# Patient Record
Sex: FEMALE | Race: WHITE | Employment: OTHER | URBAN - METROPOLITAN AREA
[De-identification: names, ages, dates, MRNs, and addresses within clinical notes are randomized per-mention and may not be internally consistent; named-entity substitution may affect disease eponyms.]

---

## 2022-01-14 ENCOUNTER — OFFICE VISIT (OUTPATIENT)
Dept: ORTHOPEDIC SURGERY | Age: 87
End: 2022-01-14
Payer: MEDICARE

## 2022-01-14 VITALS — HEIGHT: 64 IN | BODY MASS INDEX: 23.9 KG/M2 | WEIGHT: 140 LBS

## 2022-01-14 DIAGNOSIS — S93.602A FOOT SPRAIN, LEFT, INITIAL ENCOUNTER: ICD-10-CM

## 2022-01-14 DIAGNOSIS — M79.672 LEFT FOOT PAIN: Primary | ICD-10-CM

## 2022-01-14 PROCEDURE — G8420 CALC BMI NORM PARAMETERS: HCPCS | Performed by: ORTHOPAEDIC SURGERY

## 2022-01-14 PROCEDURE — 1101F PT FALLS ASSESS-DOCD LE1/YR: CPT | Performed by: ORTHOPAEDIC SURGERY

## 2022-01-14 PROCEDURE — G8536 NO DOC ELDER MAL SCRN: HCPCS | Performed by: ORTHOPAEDIC SURGERY

## 2022-01-14 PROCEDURE — 1090F PRES/ABSN URINE INCON ASSESS: CPT | Performed by: ORTHOPAEDIC SURGERY

## 2022-01-14 PROCEDURE — 99214 OFFICE O/P EST MOD 30 MIN: CPT | Performed by: ORTHOPAEDIC SURGERY

## 2022-01-14 PROCEDURE — G8432 DEP SCR NOT DOC, RNG: HCPCS | Performed by: ORTHOPAEDIC SURGERY

## 2022-01-14 PROCEDURE — G8400 PT W/DXA NO RESULTS DOC: HCPCS | Performed by: ORTHOPAEDIC SURGERY

## 2022-01-14 PROCEDURE — G8427 DOCREV CUR MEDS BY ELIG CLIN: HCPCS | Performed by: ORTHOPAEDIC SURGERY

## 2022-01-14 NOTE — PROGRESS NOTES
Jo Burch (: 1935) is a 81 y.o. female, established patient, here for evaluation of the following chief complaint(s): Foot Pain (left )       ASSESSMENT/PLAN:  Below is the assessment and plan developed based on review of pertinent history, physical exam, labs, studies, and medications. Findings were discussed with the patient today. We discussed regimen of ice, anti-inflammatories, physical therapy, home exercise program, and activity modifications. If there is continued pain and symptoms then we will plan for follow-up in the next 4-6 weeks for further evaluation and treatment planning. We will try a hard soled shoe versus short boot to see if this will help make her symptoms more tolerable. 1. Left foot pain  -     XR FOOT LT MIN 3 V; Future  2. Foot sprain, left, initial encounter  -     REFERRAL TO DME      Return in about 4 weeks (around 2022), or if symptoms worsen or fail to improve. SUBJECTIVE/OBJECTIVE:  Jo Burch (: 1935) is a 81 y.o. female. She notes sharp onset of pain at the distal portion of the foot between first and second toes that occurred 1 week ago when standing. She has had difficulty walking because of this pain. She has noted some swelling as well. Allergies   Allergen Reactions    Biaxin [Clarithromycin] Other (comments)     PSYCHOTIC AFFECT    Shellfish Derived Nausea and Vomiting and Swelling     Swelling of tounge       Current Outpatient Medications   Medication Sig    glipiZIDE (GLUCOTROL) 5 mg tablet Take 2.5 mg by mouth two (2) times a day. Indications: TYPE 2 DIABETES MELLITUS    metFORMIN (GLUCOPHAGE) 500 mg tablet Take 1,000 mg by mouth two (2) times daily (with meals). BREAKFAST AND DINNER     digoxin (LANOXIN) 0.25 mg tablet Take 0.125 mg by mouth daily. No current facility-administered medications for this visit.        Social History     Socioeconomic History    Marital status:      Spouse name: Not on file    Number of children: Not on file    Years of education: Not on file    Highest education level: Not on file   Occupational History    Not on file   Tobacco Use    Smoking status: Never Smoker    Smokeless tobacco: Never Used   Substance and Sexual Activity    Alcohol use: No    Drug use: No    Sexual activity: Not on file   Other Topics Concern    Not on file   Social History Narrative    Not on file     Social Determinants of Health     Financial Resource Strain:     Difficulty of Paying Living Expenses: Not on file   Food Insecurity:     Worried About Running Out of Food in the Last Year: Not on file    Daya of Food in the Last Year: Not on file   Transportation Needs:     Lack of Transportation (Medical): Not on file    Lack of Transportation (Non-Medical):  Not on file   Physical Activity:     Days of Exercise per Week: Not on file    Minutes of Exercise per Session: Not on file   Stress:     Feeling of Stress : Not on file   Social Connections:     Frequency of Communication with Friends and Family: Not on file    Frequency of Social Gatherings with Friends and Family: Not on file    Attends Nondenominational Services: Not on file    Active Member of [address] [place] [address] or Organizations: Not on file    Attends Club or Organization Meetings: Not on file    Marital Status: Not on file   Intimate Partner Violence:     Fear of Current or Ex-Partner: Not on file    Emotionally Abused: Not on file    Physically Abused: Not on file    Sexually Abused: Not on file   Housing Stability:     Unable to Pay for Housing in the Last Year: Not on file    Number of [place] in the Last Year: Not on file    Unstable Housing in the Last Year: Not on file       Past Surgical History:   Procedure Laterality Date    HX DILATION AND CURETTAGE  1960'S    HX HYSTERECTOMY  1984    HX TONSILLECTOMY  1956    HX UROLOGICAL  2009    BLADDER TUCK       Family History   Problem Relation Age of Onset    Cancer Mother         UTERINE    Heart Disease Father         OB History    No obstetric history on file. REVIEW OF SYSTEMS:  ROS     Positive for: Musculoskeletal    Last edited by Dre Rosa on 1/14/2022  2:28 PM. (History)        Patient denies any recent fever, chills, nausea, vomiting, chest pain, or shortness of breath. Vitals:  Ht 5' 4\" (1.626 m)   Wt 140 lb (63.5 kg)   BMI 24.03 kg/m²    Body mass index is 24.03 kg/m². PHYSICAL EXAM:  General exam: Patient is awake, alert, and oriented x3. Well-appearing. No acute distress. Ambulates with an antalgic gait    Left foot: There is obvious swelling and mild ecchymosis in the foot. Compartments are soft and compressible. She has tenderness palpation overlying the second metatarsal.  Adequate range of motion of the toes is noted. Normal stability and motion of the ankle. IMAGING:    XR Results (most recent):  Results from Appointment encounter on 01/14/22    XR FOOT LT MIN 3 V    Narrative  X-rays of the left foot 3 views done today show evidence of osteopenia diffuse. There is evidence of degenerative changes throughout the midfoot and forefoot. No obvious acute displaced fracture. There is evidence of a thickened cortical line along the lateral border of the fourth metatarsal         Orders Placed This Encounter    XR FOOT LT MIN 3 V     3     Standing Status:   Future     Number of Occurrences:   1     Standing Expiration Date:   7/14/2022   Blanca Meredith [facility] - REFERRAL TO DME [[id number]]     Referral Priority:   Routine     Referral Type:   Consultation     Referral Reason:   Specialty Services Required     Number of Visits Requested:   1              An electronic signature was used to authenticate this note.   -- Marcia Parker DO

## 2022-01-14 NOTE — LETTER
1/14/2022    Patient: Yen Celis   YOB: 1935   Date of Visit: 1/14/2022     Yun Nuñez MD  [place] [phone]  Via Fax: [phone]    Dear Yun Nuñez MD,      Thank you for referring Ms. Noni Miller to [facility] for evaluation. My notes for this consultation are attached. If you have questions, please do not hesitate to call me. I look forward to following your patient along with you.       Sincerely,    Susan Weinstein, DO

## 2023-07-13 ENCOUNTER — APPOINTMENT (OUTPATIENT)
Facility: HOSPITAL | Age: 88
End: 2023-07-13
Payer: MEDICARE

## 2023-07-13 ENCOUNTER — HOSPITAL ENCOUNTER (INPATIENT)
Facility: HOSPITAL | Age: 88
LOS: 1 days | Discharge: HOME | End: 2023-07-14
Attending: EMERGENCY MEDICINE | Admitting: INTERNAL MEDICINE
Payer: MEDICARE

## 2023-07-13 DIAGNOSIS — J96.01 ACUTE RESPIRATORY FAILURE WITH HYPOXIA (HCC): Primary | ICD-10-CM

## 2023-07-13 DIAGNOSIS — J11.1 INFLUENZA: ICD-10-CM

## 2023-07-13 PROBLEM — R09.02 HYPOXIA: Status: ACTIVE | Noted: 2023-07-13

## 2023-07-13 LAB
ANION GAP SERPL CALC-SCNC: 4 MMOL/L (ref 5–15)
BASOPHILS # BLD: 0.1 K/UL (ref 0–0.1)
BASOPHILS NFR BLD: 1 % (ref 0–1)
BUN SERPL-MCNC: 11 MG/DL (ref 6–20)
BUN/CREAT SERPL: 15 (ref 12–20)
CALCIUM SERPL-MCNC: 9.2 MG/DL (ref 8.5–10.1)
CHLORIDE SERPL-SCNC: 96 MMOL/L (ref 97–108)
CO2 SERPL-SCNC: 34 MMOL/L (ref 21–32)
CREAT SERPL-MCNC: 0.74 MG/DL (ref 0.55–1.02)
DIFFERENTIAL METHOD BLD: ABNORMAL
EOSINOPHIL # BLD: 0.1 K/UL (ref 0–0.4)
EOSINOPHIL NFR BLD: 1 % (ref 0–7)
ERYTHROCYTE [DISTWIDTH] IN BLOOD BY AUTOMATED COUNT: 13.8 % (ref 11.5–14.5)
FLUAV AG NPH QL IA: NEGATIVE
FLUBV AG NOSE QL IA: NEGATIVE
GLUCOSE BLD STRIP.AUTO-MCNC: 341 MG/DL (ref 65–117)
GLUCOSE BLD STRIP.AUTO-MCNC: 388 MG/DL (ref 65–117)
GLUCOSE SERPL-MCNC: 272 MG/DL (ref 65–100)
HCT VFR BLD AUTO: 40.1 % (ref 35–47)
HGB BLD-MCNC: 12.4 G/DL (ref 11.5–16)
IMM GRANULOCYTES # BLD AUTO: 0 K/UL (ref 0–0.04)
IMM GRANULOCYTES NFR BLD AUTO: 0 % (ref 0–0.5)
LYMPHOCYTES # BLD: 1.2 K/UL (ref 0.8–3.5)
LYMPHOCYTES NFR BLD: 17 % (ref 12–49)
MAGNESIUM SERPL-MCNC: 1.7 MG/DL (ref 1.6–2.4)
MCH RBC QN AUTO: 28.2 PG (ref 26–34)
MCHC RBC AUTO-ENTMCNC: 30.9 G/DL (ref 30–36.5)
MCV RBC AUTO: 91.3 FL (ref 80–99)
MONOCYTES # BLD: 0.6 K/UL (ref 0–1)
MONOCYTES NFR BLD: 9 % (ref 5–13)
NEUTS SEG # BLD: 5.3 K/UL (ref 1.8–8)
NEUTS SEG NFR BLD: 72 % (ref 32–75)
NRBC # BLD: 0 K/UL (ref 0–0.01)
NRBC BLD-RTO: 0 PER 100 WBC
NT PRO BNP: 277 PG/ML
PLATELET # BLD AUTO: 242 K/UL (ref 150–400)
PMV BLD AUTO: 8.6 FL (ref 8.9–12.9)
POTASSIUM SERPL-SCNC: 4.2 MMOL/L (ref 3.5–5.1)
RBC # BLD AUTO: 4.39 M/UL (ref 3.8–5.2)
SARS-COV-2 RDRP RESP QL NAA+PROBE: NOT DETECTED
SERVICE CMNT-IMP: ABNORMAL
SERVICE CMNT-IMP: ABNORMAL
SODIUM SERPL-SCNC: 134 MMOL/L (ref 136–145)
SOURCE: NORMAL
TROPONIN I SERPL HS-MCNC: 11 NG/L (ref 0–51)
WBC # BLD AUTO: 7.4 K/UL (ref 3.6–11)

## 2023-07-13 PROCEDURE — 96374 THER/PROPH/DIAG INJ IV PUSH: CPT

## 2023-07-13 PROCEDURE — 6360000002 HC RX W HCPCS

## 2023-07-13 PROCEDURE — 94761 N-INVAS EAR/PLS OXIMETRY MLT: CPT

## 2023-07-13 PROCEDURE — 83735 ASSAY OF MAGNESIUM: CPT

## 2023-07-13 PROCEDURE — 6370000000 HC RX 637 (ALT 250 FOR IP): Performed by: STUDENT IN AN ORGANIZED HEALTH CARE EDUCATION/TRAINING PROGRAM

## 2023-07-13 PROCEDURE — 2580000003 HC RX 258: Performed by: INTERNAL MEDICINE

## 2023-07-13 PROCEDURE — 99285 EMERGENCY DEPT VISIT HI MDM: CPT

## 2023-07-13 PROCEDURE — 94640 AIRWAY INHALATION TREATMENT: CPT

## 2023-07-13 PROCEDURE — 36415 COLL VENOUS BLD VENIPUNCTURE: CPT

## 2023-07-13 PROCEDURE — 82962 GLUCOSE BLOOD TEST: CPT

## 2023-07-13 PROCEDURE — 84484 ASSAY OF TROPONIN QUANT: CPT

## 2023-07-13 PROCEDURE — 83880 ASSAY OF NATRIURETIC PEPTIDE: CPT

## 2023-07-13 PROCEDURE — 71045 X-RAY EXAM CHEST 1 VIEW: CPT

## 2023-07-13 PROCEDURE — 94664 DEMO&/EVAL PT USE INHALER: CPT

## 2023-07-13 PROCEDURE — 83036 HEMOGLOBIN GLYCOSYLATED A1C: CPT

## 2023-07-13 PROCEDURE — 6370000000 HC RX 637 (ALT 250 FOR IP): Performed by: INTERNAL MEDICINE

## 2023-07-13 PROCEDURE — 87635 SARS-COV-2 COVID-19 AMP PRB: CPT

## 2023-07-13 PROCEDURE — 80048 BASIC METABOLIC PNL TOTAL CA: CPT

## 2023-07-13 PROCEDURE — 87804 INFLUENZA ASSAY W/OPTIC: CPT

## 2023-07-13 PROCEDURE — 2700000000 HC OXYGEN THERAPY PER DAY

## 2023-07-13 PROCEDURE — 93005 ELECTROCARDIOGRAM TRACING: CPT | Performed by: EMERGENCY MEDICINE

## 2023-07-13 PROCEDURE — 6370000000 HC RX 637 (ALT 250 FOR IP): Performed by: EMERGENCY MEDICINE

## 2023-07-13 PROCEDURE — 6360000002 HC RX W HCPCS: Performed by: INTERNAL MEDICINE

## 2023-07-13 PROCEDURE — 85025 COMPLETE CBC W/AUTO DIFF WBC: CPT

## 2023-07-13 PROCEDURE — 1100000003 HC PRIVATE W/ TELEMETRY

## 2023-07-13 RX ORDER — CASTOR OIL AND BALSAM, PERU 788; 87 MG/G; MG/G
OINTMENT TOPICAL 2 TIMES DAILY
Status: DISCONTINUED | OUTPATIENT
Start: 2023-07-13 | End: 2023-07-14 | Stop reason: HOSPADM

## 2023-07-13 RX ORDER — IPRATROPIUM BROMIDE AND ALBUTEROL SULFATE 2.5; .5 MG/3ML; MG/3ML
1 SOLUTION RESPIRATORY (INHALATION) EVERY 4 HOURS PRN
Status: DISCONTINUED | OUTPATIENT
Start: 2023-07-13 | End: 2023-07-14 | Stop reason: HOSPADM

## 2023-07-13 RX ORDER — BENZONATATE 100 MG/1
100 CAPSULE ORAL 3 TIMES DAILY PRN
Status: DISCONTINUED | OUTPATIENT
Start: 2023-07-13 | End: 2023-07-14 | Stop reason: HOSPADM

## 2023-07-13 RX ORDER — SODIUM CHLORIDE 9 MG/ML
INJECTION, SOLUTION INTRAVENOUS CONTINUOUS
Status: DISCONTINUED | OUTPATIENT
Start: 2023-07-13 | End: 2023-07-14

## 2023-07-13 RX ORDER — ACETAMINOPHEN 650 MG/1
650 SUPPOSITORY RECTAL EVERY 6 HOURS PRN
Status: DISCONTINUED | OUTPATIENT
Start: 2023-07-13 | End: 2023-07-14 | Stop reason: HOSPADM

## 2023-07-13 RX ORDER — SODIUM CHLORIDE 0.9 % (FLUSH) 0.9 %
5-40 SYRINGE (ML) INJECTION EVERY 12 HOURS SCHEDULED
Status: DISCONTINUED | OUTPATIENT
Start: 2023-07-13 | End: 2023-07-14 | Stop reason: HOSPADM

## 2023-07-13 RX ORDER — INSULIN LISPRO 100 [IU]/ML
0-4 INJECTION, SOLUTION INTRAVENOUS; SUBCUTANEOUS
Status: DISCONTINUED | OUTPATIENT
Start: 2023-07-13 | End: 2023-07-13

## 2023-07-13 RX ORDER — ONDANSETRON 2 MG/ML
4 INJECTION INTRAMUSCULAR; INTRAVENOUS EVERY 6 HOURS PRN
Status: DISCONTINUED | OUTPATIENT
Start: 2023-07-13 | End: 2023-07-14 | Stop reason: HOSPADM

## 2023-07-13 RX ORDER — DIGOXIN 125 MCG
0.12 TABLET ORAL DAILY
Status: DISCONTINUED | OUTPATIENT
Start: 2023-07-14 | End: 2023-07-13

## 2023-07-13 RX ORDER — ENOXAPARIN SODIUM 100 MG/ML
40 INJECTION SUBCUTANEOUS EVERY 24 HOURS
Status: DISCONTINUED | OUTPATIENT
Start: 2023-07-13 | End: 2023-07-14 | Stop reason: HOSPADM

## 2023-07-13 RX ORDER — ONDANSETRON 2 MG/ML
4 INJECTION INTRAMUSCULAR; INTRAVENOUS EVERY 4 HOURS PRN
Status: DISCONTINUED | OUTPATIENT
Start: 2023-07-13 | End: 2023-07-13

## 2023-07-13 RX ORDER — ONDANSETRON 4 MG/1
4 TABLET, ORALLY DISINTEGRATING ORAL EVERY 8 HOURS PRN
Status: DISCONTINUED | OUTPATIENT
Start: 2023-07-13 | End: 2023-07-14 | Stop reason: HOSPADM

## 2023-07-13 RX ORDER — DEXTROSE MONOHYDRATE 100 MG/ML
INJECTION, SOLUTION INTRAVENOUS CONTINUOUS PRN
Status: DISCONTINUED | OUTPATIENT
Start: 2023-07-13 | End: 2023-07-14 | Stop reason: HOSPADM

## 2023-07-13 RX ORDER — OSELTAMIVIR PHOSPHATE 75 MG/1
75 CAPSULE ORAL ONCE
Status: COMPLETED | OUTPATIENT
Start: 2023-07-13 | End: 2023-07-13

## 2023-07-13 RX ORDER — SODIUM CHLORIDE 0.9 % (FLUSH) 0.9 %
5-40 SYRINGE (ML) INJECTION PRN
Status: DISCONTINUED | OUTPATIENT
Start: 2023-07-13 | End: 2023-07-14 | Stop reason: HOSPADM

## 2023-07-13 RX ORDER — ALBUTEROL SULFATE 2.5 MG/3ML
2.5 SOLUTION RESPIRATORY (INHALATION) EVERY 6 HOURS PRN
Status: DISCONTINUED | OUTPATIENT
Start: 2023-07-13 | End: 2023-07-14 | Stop reason: HOSPADM

## 2023-07-13 RX ORDER — GUAIFENESIN 600 MG/1
600 TABLET, EXTENDED RELEASE ORAL 2 TIMES DAILY
Status: DISCONTINUED | OUTPATIENT
Start: 2023-07-13 | End: 2023-07-14 | Stop reason: HOSPADM

## 2023-07-13 RX ORDER — OSELTAMIVIR PHOSPHATE 30 MG/1
30 CAPSULE ORAL 2 TIMES DAILY
Status: DISCONTINUED | OUTPATIENT
Start: 2023-07-13 | End: 2023-07-14 | Stop reason: HOSPADM

## 2023-07-13 RX ORDER — GLIPIZIDE 5 MG/1
2.5 TABLET ORAL 2 TIMES DAILY
Status: DISCONTINUED | OUTPATIENT
Start: 2023-07-13 | End: 2023-07-14 | Stop reason: HOSPADM

## 2023-07-13 RX ORDER — MAGNESIUM HYDROXIDE/ALUMINUM HYDROXICE/SIMETHICONE 120; 1200; 1200 MG/30ML; MG/30ML; MG/30ML
30 SUSPENSION ORAL EVERY 6 HOURS PRN
Status: DISCONTINUED | OUTPATIENT
Start: 2023-07-13 | End: 2023-07-14 | Stop reason: HOSPADM

## 2023-07-13 RX ORDER — INSULIN LISPRO 100 [IU]/ML
0-16 INJECTION, SOLUTION INTRAVENOUS; SUBCUTANEOUS
Status: DISCONTINUED | OUTPATIENT
Start: 2023-07-14 | End: 2023-07-14 | Stop reason: HOSPADM

## 2023-07-13 RX ORDER — INSULIN LISPRO 100 [IU]/ML
0-4 INJECTION, SOLUTION INTRAVENOUS; SUBCUTANEOUS NIGHTLY
Status: DISCONTINUED | OUTPATIENT
Start: 2023-07-13 | End: 2023-07-13

## 2023-07-13 RX ORDER — SODIUM CHLORIDE 9 MG/ML
INJECTION, SOLUTION INTRAVENOUS PRN
Status: DISCONTINUED | OUTPATIENT
Start: 2023-07-13 | End: 2023-07-14 | Stop reason: HOSPADM

## 2023-07-13 RX ORDER — INSULIN LISPRO 100 [IU]/ML
0-4 INJECTION, SOLUTION INTRAVENOUS; SUBCUTANEOUS NIGHTLY
Status: DISCONTINUED | OUTPATIENT
Start: 2023-07-13 | End: 2023-07-14 | Stop reason: HOSPADM

## 2023-07-13 RX ORDER — POLYETHYLENE GLYCOL 3350 17 G/17G
17 POWDER, FOR SOLUTION ORAL DAILY PRN
Status: DISCONTINUED | OUTPATIENT
Start: 2023-07-13 | End: 2023-07-14 | Stop reason: HOSPADM

## 2023-07-13 RX ORDER — ACETAMINOPHEN 325 MG/1
650 TABLET ORAL EVERY 6 HOURS PRN
Status: DISCONTINUED | OUTPATIENT
Start: 2023-07-13 | End: 2023-07-14 | Stop reason: HOSPADM

## 2023-07-13 RX ORDER — IPRATROPIUM BROMIDE AND ALBUTEROL SULFATE 2.5; .5 MG/3ML; MG/3ML
1 SOLUTION RESPIRATORY (INHALATION)
Status: COMPLETED | OUTPATIENT
Start: 2023-07-13 | End: 2023-07-13

## 2023-07-13 RX ORDER — DEXTROSE MONOHYDRATE 100 MG/ML
INJECTION, SOLUTION INTRAVENOUS CONTINUOUS PRN
Status: DISCONTINUED | OUTPATIENT
Start: 2023-07-13 | End: 2023-07-14

## 2023-07-13 RX ADMIN — GUAIFENESIN 600 MG: 600 TABLET, EXTENDED RELEASE ORAL at 22:20

## 2023-07-13 RX ADMIN — OSELTAMIVIR PHOSPHATE 30 MG: 30 CAPSULE ORAL at 22:19

## 2023-07-13 RX ADMIN — Medication 3 UNITS: at 18:31

## 2023-07-13 RX ADMIN — SODIUM CHLORIDE: 9 INJECTION, SOLUTION INTRAVENOUS at 18:26

## 2023-07-13 RX ADMIN — METFORMIN HYDROCHLORIDE 1000 MG: 500 TABLET ORAL at 18:31

## 2023-07-13 RX ADMIN — IPRATROPIUM BROMIDE AND ALBUTEROL SULFATE 1 DOSE: 2.5; .5 SOLUTION RESPIRATORY (INHALATION) at 11:41

## 2023-07-13 RX ADMIN — Medication: at 22:20

## 2023-07-13 RX ADMIN — GLIPIZIDE 2.5 MG: 5 TABLET ORAL at 16:23

## 2023-07-13 RX ADMIN — Medication 8 UNITS: at 22:19

## 2023-07-13 RX ADMIN — GLIPIZIDE 2.5 MG: 5 TABLET ORAL at 22:21

## 2023-07-13 RX ADMIN — OSELTAMIVIR PHOSPHATE 75 MG: 75 CAPSULE ORAL at 11:57

## 2023-07-13 RX ADMIN — METHYLPREDNISOLONE SODIUM SUCCINATE 40 MG: 40 INJECTION, POWDER, FOR SOLUTION INTRAMUSCULAR; INTRAVENOUS at 22:19

## 2023-07-13 RX ADMIN — METHYLPREDNISOLONE SODIUM SUCCINATE 60 MG: 125 INJECTION, POWDER, FOR SOLUTION INTRAMUSCULAR; INTRAVENOUS at 11:38

## 2023-07-13 RX ADMIN — GUAIFENESIN 600 MG: 600 TABLET, EXTENDED RELEASE ORAL at 16:23

## 2023-07-13 ASSESSMENT — PAIN SCALES - GENERAL
PAINLEVEL_OUTOF10: 0
PAINLEVEL_OUTOF10: 0

## 2023-07-13 ASSESSMENT — LIFESTYLE VARIABLES
HOW OFTEN DO YOU HAVE A DRINK CONTAINING ALCOHOL: NEVER
HOW MANY STANDARD DRINKS CONTAINING ALCOHOL DO YOU HAVE ON A TYPICAL DAY: PATIENT DOES NOT DRINK

## 2023-07-13 NOTE — ED NOTES
Per Triage note: [facility] with positive flu a and b - had been feeling unwell x 2 days, [facility] for low room air sat and need for breathing treatments    Bedside and Verbal shift change report given to Bjorn Klein (oncoming nurse) by Lorena Ortiz RN (offgoing nurse). Report included the following information Nurse Handoff Report, Index, ED Encounter Summary, ED SBAR, MAR, Recent Results, Med Rec Status, and Neuro Assessment.        Soo Garcia RN  07/13/23 2134

## 2023-07-13 NOTE — PROGRESS NOTES
End of Shift Note    Bedside shift change report given to Junie James RN (oncoming nurse) by Mila Lizarraga RN (offgoing nurse). Report included the following information SBAR, Kardex, and MAR    Shift worked:  [id number]     Shift summary and any significant changes:     Pt transferred from ED for respiratory distress and influenza. Pt on droplet contact. Pt on 2 L NC, lung sounds expressing respiratory wheezing at rest and on exertion. NS IVF infusing. Admission database completed. Venelex ordered, pt [place]     Concerns for physician to address:  -     Zone phone for oncoming shift:   -       Activity:     Number times ambulated in hallways past shift: 0  Number of times OOB to chair past shift: 0    Cardiac:   Cardiac Monitoring: Yes           Access:  Current line(s): PIV     Genitourinary:   Urinary status: voiding    Respiratory:      Chronic home O2 use?: NO  Incentive spirometer at bedside: NO       GI:     Current diet:  ADULT DIET; Regular; 5 carb choices (75 gm/meal);  Low Fat/Low Chol/High Fiber/2 gm Na  Passing flatus: YES  Tolerating current diet: YES       Pain Management:   Patient states pain is manageable on current regimen: YES    Skin:     Interventions: float heels, increase time out of bed, and PT/OT consult    Patient Safety:  Fall Score:    Interventions: bed/chair alarm, gripper socks, pt to call before getting OOB, and stay with me (per policy)       Length of Stay:  Expected LOS: 2  Actual LOS: 0      Mila Lizarraga RN

## 2023-07-13 NOTE — H&P
Hospitalist Admission Note    NAME:   Lenora Smith   : 1935   MRN: [id number]     Date/Time: 2023 2:36 PM    Patient PCP: None None    ______________________________________________________________________  Given the patient's current clinical presentation, I have a high level of concern for decompensation if discharged from the emergency department. Complex decision making was performed, which includes reviewing the patient's available past medical records, laboratory results, and x-ray films. My assessment of this patient's clinical condition and my plan of care is as follows. Assessment / Plan:  Influenza infection  Atelectasis  Monitor patient in telemetry. Maintain on droplet isolation. Continue with Tamiflu. Incentive spirometer. Hypoxia  Currently patient is requiring 2 L of oxygen to maintain her saturation. RT evaluation and treatment. We will continue with Solu-Medrol. DuoNeb/albuterol/Mucinex/Tessalon Perles. Diabetes mellitus  Hyperglycemia  Continue with glipizide, metformin. Start on sliding scale insulin. We will also check for A1c. Dehydration  Patient has hyponatremia and hypochloremia. Gentle hydration with normal saline at 75 cc/h. Medical Decision Making:   I personally reviewed labs: CBC, BMP, flu test, rapid COVID-19 test  I personally reviewed imaging: EKG, chest x-ray  I personally reviewed EKG: Normal sinus rhythm  Toxic drug monitoring: None  Discussed case with: ED provider. After discussion I am in agreement that acuity of patient's medical condition necessitates hospital stay. Code Status: Full code  DVT Prophylaxis: Lovenox  GI Prophylaxis:  Baseline: Independent from home, lives with     Subjective:   CHIEF COMPLAINT: Shortness of breath    HISTORY OF PRESENT ILLNESS:     Raul Ramirez is a 81 y.o.  female with PMHx significant for diabetes mellitus and very hard of hearing.   For the last 4 days, patient reports

## 2023-07-13 NOTE — PROGRESS NOTES
ADULT PROTOCOL: JET AEROSOL ASSESSMENT    Patient  Robert Felder     81 y.o.   female     7/13/2023  11:52 AM    Breath Sounds Pre Procedure: Breath Sounds Pre-Tx TONY: Expiratory Wheezes                                  Breath Sounds Pre-Tx LLL: Expiratory Wheezes        Breath Sounds Pre-Tx RUL: Expiratory Wheezes        Breath Sounds Pre-Tx RML: Expiratory Wheezes        Breath Sounds Pre-Tx RLL: Expiratory Wheezes  Breath Sounds Post Procedure: Breath Sounds Post-Tx TONY: Expiratory Wheezes          Breath Sounds Post-Tx LLL: Expiratory Wheezes          Breath Sounds Post-Tx RUL: Expiratory Wheezes          Breath Sounds Post-Tx RML: Expiratory Wheezes          Breath Sounds Post-Tx RLL: Expiratory Wheezes                                       Heart Rate: Pre procedure Pre-Tx Pulse: 85           Post procedure Post-Tx Pulse: 84    Resp Rate: Pre procedure Pre-Tx Resps: 24           Post procedure Post-Tx Resps: 24        Oxygen: O2 Therapy: Oxygen   nasal cannula     Changed: No    SpO2:  SpO2: 98 %   with Oxygen                Nebulizer Therapy: Current medications Medications: Albuterol/Ipratropium      Changed: No      Problem List: There is no problem list on file for this patient.       Respiratory Therapist: Dafne Bains RT

## 2023-07-13 NOTE — PROGRESS NOTES
Pharmacy Note - Renal dose adjustment made per P/T protocol    Original order:  Tamiflu 75 mg po BID    Estimated Creatinine Clearance: 46 mL/min (based on SCr of 0.74 mg/dL). Recent Labs     07/13/23  1126   BUN 11   CREATININE 0.74       Renally adjusted order:  Tamiflu 30 mg po BID    Please call pharmacy with any questions.     Thank you,  Vladislav Duke, [facility]  7/13/2023 2:40 PM

## 2023-07-14 VITALS
RESPIRATION RATE: 18 BRPM | WEIGHT: 136.47 LBS | DIASTOLIC BLOOD PRESSURE: 68 MMHG | OXYGEN SATURATION: 94 % | HEART RATE: 92 BPM | HEIGHT: 64 IN | TEMPERATURE: 97.6 F | SYSTOLIC BLOOD PRESSURE: 162 MMHG | BODY MASS INDEX: 23.3 KG/M2

## 2023-07-14 LAB
ANION GAP SERPL CALC-SCNC: 5 MMOL/L (ref 5–15)
BUN SERPL-MCNC: 15 MG/DL (ref 6–20)
BUN/CREAT SERPL: 25 (ref 12–20)
CALCIUM SERPL-MCNC: 9.3 MG/DL (ref 8.5–10.1)
CHLORIDE SERPL-SCNC: 97 MMOL/L (ref 97–108)
CO2 SERPL-SCNC: 31 MMOL/L (ref 21–32)
CREAT SERPL-MCNC: 0.61 MG/DL (ref 0.55–1.02)
EKG ATRIAL RATE: 83 BPM
EKG DIAGNOSIS: NORMAL
EKG P AXIS: 108 DEGREES
EKG P-R INTERVAL: 170 MS
EKG Q-T INTERVAL: 356 MS
EKG QRS DURATION: 80 MS
EKG QTC CALCULATION (BAZETT): 418 MS
EKG R AXIS: -7 DEGREES
EKG T AXIS: 86 DEGREES
EKG VENTRICULAR RATE: 83 BPM
ERYTHROCYTE [DISTWIDTH] IN BLOOD BY AUTOMATED COUNT: 13.4 % (ref 11.5–14.5)
EST. AVERAGE GLUCOSE BLD GHB EST-MCNC: 163 MG/DL
GLUCOSE BLD STRIP.AUTO-MCNC: 209 MG/DL (ref 65–117)
GLUCOSE BLD STRIP.AUTO-MCNC: 213 MG/DL (ref 65–117)
GLUCOSE BLD STRIP.AUTO-MCNC: 222 MG/DL (ref 65–117)
GLUCOSE BLD STRIP.AUTO-MCNC: 289 MG/DL (ref 65–117)
GLUCOSE BLD STRIP.AUTO-MCNC: 291 MG/DL (ref 65–117)
GLUCOSE BLD STRIP.AUTO-MCNC: 313 MG/DL (ref 65–117)
GLUCOSE SERPL-MCNC: 309 MG/DL (ref 65–100)
HBA1C MFR BLD: 7.3 % (ref 4–5.6)
HCT VFR BLD AUTO: 39.7 % (ref 35–47)
HGB BLD-MCNC: 12.6 G/DL (ref 11.5–16)
LACTATE SERPL-SCNC: 0.8 MMOL/L (ref 0.4–2)
MAGNESIUM SERPL-MCNC: 1.8 MG/DL (ref 1.6–2.4)
MCH RBC QN AUTO: 29.2 PG (ref 26–34)
MCHC RBC AUTO-ENTMCNC: 31.7 G/DL (ref 30–36.5)
MCV RBC AUTO: 91.9 FL (ref 80–99)
NRBC # BLD: 0 K/UL (ref 0–0.01)
NRBC BLD-RTO: 0 PER 100 WBC
PLATELET # BLD AUTO: 254 K/UL (ref 150–400)
PMV BLD AUTO: 9 FL (ref 8.9–12.9)
POTASSIUM SERPL-SCNC: 4.8 MMOL/L (ref 3.5–5.1)
PROCALCITONIN SERPL-MCNC: <0.05 NG/ML
RBC # BLD AUTO: 4.32 M/UL (ref 3.8–5.2)
SERVICE CMNT-IMP: ABNORMAL
SODIUM SERPL-SCNC: 133 MMOL/L (ref 136–145)
WBC # BLD AUTO: 7.9 K/UL (ref 3.6–11)

## 2023-07-14 PROCEDURE — 6370000000 HC RX 637 (ALT 250 FOR IP): Performed by: INTERNAL MEDICINE

## 2023-07-14 PROCEDURE — 97530 THERAPEUTIC ACTIVITIES: CPT

## 2023-07-14 PROCEDURE — 80048 BASIC METABOLIC PNL TOTAL CA: CPT

## 2023-07-14 PROCEDURE — 36415 COLL VENOUS BLD VENIPUNCTURE: CPT

## 2023-07-14 PROCEDURE — 6360000002 HC RX W HCPCS: Performed by: INTERNAL MEDICINE

## 2023-07-14 PROCEDURE — 84145 PROCALCITONIN (PCT): CPT

## 2023-07-14 PROCEDURE — 6370000000 HC RX 637 (ALT 250 FOR IP): Performed by: STUDENT IN AN ORGANIZED HEALTH CARE EDUCATION/TRAINING PROGRAM

## 2023-07-14 PROCEDURE — 85027 COMPLETE CBC AUTOMATED: CPT

## 2023-07-14 PROCEDURE — 83735 ASSAY OF MAGNESIUM: CPT

## 2023-07-14 PROCEDURE — 82962 GLUCOSE BLOOD TEST: CPT

## 2023-07-14 PROCEDURE — 97116 GAIT TRAINING THERAPY: CPT

## 2023-07-14 PROCEDURE — 2700000000 HC OXYGEN THERAPY PER DAY

## 2023-07-14 PROCEDURE — 2580000003 HC RX 258: Performed by: INTERNAL MEDICINE

## 2023-07-14 PROCEDURE — 97165 OT EVAL LOW COMPLEX 30 MIN: CPT

## 2023-07-14 PROCEDURE — 97163 PT EVAL HIGH COMPLEX 45 MIN: CPT

## 2023-07-14 PROCEDURE — 83605 ASSAY OF LACTIC ACID: CPT

## 2023-07-14 RX ORDER — PREDNISONE 20 MG/1
20 TABLET ORAL DAILY
Qty: 3 TABLET | Refills: 0 | Status: SHIPPED | OUTPATIENT
Start: 2023-07-14 | End: 2023-07-17

## 2023-07-14 RX ORDER — OSELTAMIVIR PHOSPHATE 30 MG/1
30 CAPSULE ORAL 2 TIMES DAILY
Qty: 7 CAPSULE | Refills: 0 | Status: SHIPPED | OUTPATIENT
Start: 2023-07-14 | End: 2023-07-18

## 2023-07-14 RX ORDER — BENZONATATE 100 MG/1
100 CAPSULE ORAL 3 TIMES DAILY PRN
Qty: 20 CAPSULE | Refills: 0 | Status: SHIPPED | OUTPATIENT
Start: 2023-07-14 | End: 2023-07-21

## 2023-07-14 RX ORDER — OSELTAMIVIR PHOSPHATE 30 MG/1
30 CAPSULE ORAL 2 TIMES DAILY
Qty: 7 CAPSULE | Refills: 0 | Status: SHIPPED | OUTPATIENT
Start: 2023-07-14 | End: 2023-07-14 | Stop reason: SDUPTHER

## 2023-07-14 RX ADMIN — GLIPIZIDE 2.5 MG: 5 TABLET ORAL at 08:32

## 2023-07-14 RX ADMIN — GUAIFENESIN 600 MG: 600 TABLET, EXTENDED RELEASE ORAL at 08:32

## 2023-07-14 RX ADMIN — Medication 8 UNITS: at 08:46

## 2023-07-14 RX ADMIN — SODIUM CHLORIDE, PRESERVATIVE FREE 10 ML: 5 INJECTION INTRAVENOUS at 08:48

## 2023-07-14 RX ADMIN — METFORMIN HYDROCHLORIDE 500 MG: 500 TABLET ORAL at 16:48

## 2023-07-14 RX ADMIN — Medication 10 UNITS: at 15:15

## 2023-07-14 RX ADMIN — METHYLPREDNISOLONE SODIUM SUCCINATE 40 MG: 40 INJECTION, POWDER, FOR SOLUTION INTRAMUSCULAR; INTRAVENOUS at 08:46

## 2023-07-14 RX ADMIN — Medication 4 UNITS: at 16:49

## 2023-07-14 RX ADMIN — METHYLPREDNISOLONE SODIUM SUCCINATE 40 MG: 40 INJECTION, POWDER, FOR SOLUTION INTRAMUSCULAR; INTRAVENOUS at 15:14

## 2023-07-14 RX ADMIN — METFORMIN HYDROCHLORIDE 1000 MG: 500 TABLET ORAL at 08:32

## 2023-07-14 RX ADMIN — Medication 4 UNITS: at 12:56

## 2023-07-14 RX ADMIN — OSELTAMIVIR PHOSPHATE 30 MG: 30 CAPSULE ORAL at 08:32

## 2023-07-14 RX ADMIN — Medication: at 08:32

## 2023-07-14 NOTE — PROGRESS NOTES
Pulse oximetry assessment   89% at rest on room air (if 88% or less, skip next steps)  86% while ambulating on room air  96% at rest on 2LPM  93% while ambulating on 2LPM       Meghan Fajardo, PT

## 2023-07-14 NOTE — PLAN OF CARE
Problem: Respiratory - Adult  Goal: Achieves optimal ventilation and oxygenation  Outcome: Progressing  Flowsheets (Taken 7/13/2023 1820 by Aruna Zamorano RN)  Achieves optimal ventilation and oxygenation:   Assess for changes in respiratory status   Assess for changes in mentation and behavior     Problem: Discharge Planning  Goal: Discharge to home or other facility with appropriate resources  Outcome: Progressing  Flowsheets  Taken 7/13/2023 2020 by Fran Joshi RN  Discharge to home or other facility with appropriate resources:   Identify barriers to discharge with patient and caregiver   Arrange for needed discharge resources and transportation as appropriate   Identify discharge learning needs (meds, wound care, etc)  Taken 7/13/2023 1820 by Aruna Zamorano RN  Discharge to home or other facility with appropriate resources: Identify barriers to discharge with patient and caregiver     Problem: Pain  Goal: Verbalizes/displays adequate comfort level or baseline comfort level  Outcome: Progressing  Flowsheets (Taken 7/13/2023 1820 by Aruna Zamorano RN)  Verbalizes/displays adequate comfort level or baseline comfort level:   Encourage patient to monitor pain and request assistance   Assess pain using appropriate pain scale   Administer analgesics based on type and severity of pain and evaluate response     Problem: Skin/Tissue Integrity  Goal: Absence of new skin breakdown  Description: 1. Monitor for areas of redness and/or skin breakdown  2. Assess vascular access sites hourly  3. Every 4-6 hours minimum:  Change oxygen saturation probe site  4. Every 4-6 hours:  If on nasal continuous positive airway pressure, respiratory therapy assess nares and determine need for appliance change or resting period.   Outcome: Progressing     Problem: ABCDS Injury Assessment  Goal: Absence of physical injury  Outcome: Progressing     Problem: Safety - Adult  Goal: Free from fall injury  Outcome: Progressing

## 2023-07-14 NOTE — PROGRESS NOTES
Discharged patient. Reviewed all discharge instructions and gave opportunity for questions. IV removed, telemetry removed. Patient escorted to vehicle with all personal belongings.

## 2023-07-14 NOTE — PLAN OF CARE
Problem: Physical Therapy - Adult  Goal: By Discharge: Performs mobility at highest level of function for planned discharge setting. See evaluation for individualized goals. Description: FUNCTIONAL STATUS PRIOR TO ADMISSION: Patient was independent without use of DME. and The patient  was independent for basic and instrumental ADLs. No history of falls. HOME SUPPORT PRIOR TO ADMISSION: The patient lived with spouse but did not require assistance. Lives in [address] home with level entry. Physical Therapy Goals  Initiated 7/14/2023  1. Patient will move from supine to sit and sit to supine, scoot up and down, and roll side to side in bed with independence within 7 day(s). 2.  Patient will perform sit to stand with independence within 7 day(s). 3.  Patient will transfer from bed to chair and chair to bed with independence using the least restrictive device within 7 day(s). 4.  Patient will ambulate with independence for 350 feet with the least restrictive device within 7 day(s). Outcome: Progressing   PHYSICAL THERAPY EVALUATION    Patient: Robert Felder (60 y.o. female)  Date: 7/14/2023  Primary Diagnosis: Hypoxia [R09.02]  Influenza [J11.1]       Precautions: Fall Risk, Bed Alarm, Up as Tolerated                    ASSESSMENT :   DEFICITS/IMPAIRMENTS:   The patient is limited by decreased functional mobility, strength, activity tolerance, endurance, balance   Following admission for hypoxia due to influenza yesterday. She was previously independent without the need for assistive devices. O2 challenge done with results below. Pulse oximetry assessment   89% at rest on room air (if 88% or less, skip next steps)  86% while ambulating on room air  96% at rest on 2LPM  93% while ambulating on 2LPM       Based on the impairments listed above she is needing increased assistance with bed mobility, transfer and ambulation. Noted intermittent lob during standing activities with turning.  She was able

## 2023-07-14 NOTE — DISCHARGE INSTRUCTIONS
Complete prednisone for 3 days, steroid to reduce inflammation in the lungs   Will make your blood sugar run a bit high    Complete the tamiflu for treatment of the influenza    Limit activity for next week to doing you necessary activities like going to bathroom, cooking, etc   Avoid strenuous activites

## 2023-07-14 NOTE — PROGRESS NOTES
Shift change report given to Pramod (oncoming nurse) by Ronda Quiroz (offgoing nurse). Report included the following information Nurse Handoff Report, Index, ED Encounter Summary, ED SBAR, Adult Overview, Intake/Output, MAR, Recent Results, Cardiac Rhythm SR, and Neuro Assessment. Patient is stable but unsteady. Bed/chair alarms on at all times. Patient weaned to 1L NC and provided with an incentive spirometer. Patient received education regarding IS.  at bedside. 1130 Bedside report given to Diann Carpenter RN.

## 2023-07-14 NOTE — CARE COORDINATION
patient's PCP has passed away and has not seen a new PCP in several month, [facility] but agreeable for CM to assist in finding new PCP; Hosea Whatley was notified. The patient is alert, able to make needs known, able to perform ADLs and able to drive. She lives with her , Jess Merlos in a house. She is hard of hearing but denied the need for hearing aid resources. She uses a nebulizer at home and denies the need for DMEs. She has not had home health or rehab in the past. The patient denied any further needs or concerns. The patient , Eliezer Santos Spouse [phone] will provide transportation upon discharge. 11:20 Provided the patient with Home health agency list, emphasizing her right to choose any agency within network.  Referral was sent     Stephan Robledo RN  Case Management  [phone]

## 2023-07-14 NOTE — ED PROVIDER NOTES
consultations with specialist, family decision- making, bedside attention and documentation. During this entire length of time I was immediately available to the patient      Disposition:  admission    PLAN:  1. Admit     Diagnosis     Clinical Impression:   1. Acute respiratory failure with hypoxia ([address])    2. Influenza        I, Jayesh Willis MD am the first provider for this patient and am the attending of record for this patient encounter. Jayesh Willis MD    Please note that this dictation was completed with Dragon, computer voice recognition software. Quite often unanticipated grammatical, syntax, homophones, and other interpretive errors are inadvertently transcribed by the computer software. Please disregard these errors. Additionally, please excuse any errors that have escaped final proofreading.             Nancie Gallo MD  07/13/23 8932

## 2023-07-14 NOTE — DISCHARGE SUMMARY
Hospitalist Progress Note    NAME:   Maday Baldwin   : 1935   MRN: [id number]     Admit date: 2023    Discharge date: 23    PCP: None None    Discharge Diagnoses:    Discharge Medications:  Current Discharge Medication List        START taking these medications    Details   benzonatate (TESSALON) 100 MG capsule Take 1 capsule by mouth 3 times daily as needed for Cough  Qty: 20 capsule, Refills: 0      predniSONE (DELTASONE) 20 MG tablet Take 1 tablet by mouth daily for 3 days  Qty: 3 tablet, Refills: 0      oseltamivir (TAMIFLU) 30 MG capsule Take 1 capsule by mouth 2 times daily for 7 doses  Qty: 7 capsule, Refills: 0           CONTINUE these medications which have NOT CHANGED    Details   digoxin (LANOXIN) 250 MCG tablet Take 0.5 tablets by mouth daily      glipiZIDE (GLUCOTROL) 5 MG tablet Take 0.5 tablets by mouth 2 times daily      metFORMIN (GLUCOPHAGE) 500 MG tablet Take 2 tablets by mouth 2 times daily (with meals)             Follow-up Information       Follow up With Specialties Details Why Cody Baeza PA-C Physician Assistant Go on 2023 at 1:40pm for your NEW PATIENT PCP hospital follow up. Please arrive 15 minutes early, bring photo ID, insurance cards, copay, all current medication bottles including any over the counter vitamins/meds, and any completed forms. [address] [place] [address]  [phone]              Time spent on discharge:   I spent 38 minutes on discharge, seeing and examining the patient, reconciling home meds and new meds, coordinating care with case management, doing the discharge papers and the D/C summary    Discharge disposition: home    Discharge Condition: Stable    Summary of admission H+P(copied from Philippe Taylor MD Note):     CHIEF COMPLAINT: Shortness of breath     HISTORY OF PRESENT ILLNESS:     Bonnie Harrison is a 81 y.o.  female with PMHx significant for diabetes mellitus and very hard of hearing.

## 2023-07-14 NOTE — PROGRESS NOTES
26 - NEW PATIENT PCP hospital follow-up transitional care appointment has been scheduled with AYAAK Nolasco on 7/18/23 9560. Pending patient discharge. Sarah Odell Care Management Assistant     Attempted to schedule NEW PATIENT PCP appt with [facility] however their first appt is 11/23.  Sarah Odell Care Management Assistant

## 2023-08-21 ENCOUNTER — HOSPITAL ENCOUNTER (INPATIENT)
Facility: HOSPITAL | Age: 88
LOS: 4 days | Discharge: NURSING, REHAB OR LONG-TERM CARE | End: 2023-08-26
Attending: EMERGENCY MEDICINE | Admitting: STUDENT IN AN ORGANIZED HEALTH CARE EDUCATION/TRAINING PROGRAM
Payer: MEDICARE

## 2023-08-21 ENCOUNTER — APPOINTMENT (OUTPATIENT)
Facility: HOSPITAL | Age: 88
End: 2023-08-21
Payer: MEDICARE

## 2023-08-21 DIAGNOSIS — S72.001A CLOSED FRACTURE OF RIGHT HIP, INITIAL ENCOUNTER (HCC): Primary | ICD-10-CM

## 2023-08-21 LAB
ALBUMIN SERPL-MCNC: 3.9 G/DL (ref 3.5–5)
ALBUMIN/GLOB SERPL: 1 (ref 1.1–2.2)
ALP SERPL-CCNC: 96 U/L (ref 45–117)
ALT SERPL-CCNC: 20 U/L (ref 12–78)
ANION GAP SERPL CALC-SCNC: 5 MMOL/L (ref 5–15)
APPEARANCE UR: CLEAR
AST SERPL-CCNC: 15 U/L (ref 15–37)
BACTERIA URNS QL MICRO: NEGATIVE /HPF
BASOPHILS # BLD: 0.1 K/UL (ref 0–0.1)
BASOPHILS NFR BLD: 0 % (ref 0–1)
BILIRUB SERPL-MCNC: 0.4 MG/DL (ref 0.2–1)
BILIRUB UR QL: NEGATIVE
BUN SERPL-MCNC: 19 MG/DL (ref 6–20)
BUN/CREAT SERPL: 28 (ref 12–20)
CALCIUM SERPL-MCNC: 9 MG/DL (ref 8.5–10.1)
CHLORIDE SERPL-SCNC: 100 MMOL/L (ref 97–108)
CO2 SERPL-SCNC: 30 MMOL/L (ref 21–32)
COLOR UR: NORMAL
COMMENT:: NORMAL
CREAT SERPL-MCNC: 0.67 MG/DL (ref 0.55–1.02)
DIFFERENTIAL METHOD BLD: ABNORMAL
EOSINOPHIL # BLD: 0.1 K/UL (ref 0–0.4)
EOSINOPHIL NFR BLD: 1 % (ref 0–7)
EPITH CASTS URNS QL MICRO: NORMAL /LPF
ERYTHROCYTE [DISTWIDTH] IN BLOOD BY AUTOMATED COUNT: 13.7 % (ref 11.5–14.5)
GLOBULIN SER CALC-MCNC: 4.1 G/DL (ref 2–4)
GLUCOSE SERPL-MCNC: 204 MG/DL (ref 65–100)
GLUCOSE UR STRIP.AUTO-MCNC: NEGATIVE MG/DL
HCT VFR BLD AUTO: 37.8 % (ref 35–47)
HGB BLD-MCNC: 12.2 G/DL (ref 11.5–16)
HGB UR QL STRIP: NEGATIVE
HYALINE CASTS URNS QL MICRO: NORMAL /LPF (ref 0–2)
IMM GRANULOCYTES # BLD AUTO: 0.1 K/UL (ref 0–0.04)
IMM GRANULOCYTES NFR BLD AUTO: 1 % (ref 0–0.5)
INR PPP: 1.1 (ref 0.9–1.1)
KETONES UR QL STRIP.AUTO: NEGATIVE MG/DL
LEUKOCYTE ESTERASE UR QL STRIP.AUTO: NEGATIVE
LYMPHOCYTES # BLD: 2 K/UL (ref 0.8–3.5)
LYMPHOCYTES NFR BLD: 17 % (ref 12–49)
MCH RBC QN AUTO: 28.8 PG (ref 26–34)
MCHC RBC AUTO-ENTMCNC: 32.3 G/DL (ref 30–36.5)
MCV RBC AUTO: 89.4 FL (ref 80–99)
MONOCYTES # BLD: 0.7 K/UL (ref 0–1)
MONOCYTES NFR BLD: 6 % (ref 5–13)
NEUTS SEG # BLD: 8.8 K/UL (ref 1.8–8)
NEUTS SEG NFR BLD: 75 % (ref 32–75)
NITRITE UR QL STRIP.AUTO: NEGATIVE
NRBC # BLD: 0 K/UL (ref 0–0.01)
NRBC BLD-RTO: 0 PER 100 WBC
PH UR STRIP: 6.5 (ref 5–8)
PLATELET # BLD AUTO: 260 K/UL (ref 150–400)
PMV BLD AUTO: 9 FL (ref 8.9–12.9)
POTASSIUM SERPL-SCNC: 4.7 MMOL/L (ref 3.5–5.1)
PROT SERPL-MCNC: 8 G/DL (ref 6.4–8.2)
PROT UR STRIP-MCNC: NEGATIVE MG/DL
PROTHROMBIN TIME: 11 SEC (ref 9–11.1)
RBC # BLD AUTO: 4.23 M/UL (ref 3.8–5.2)
RBC #/AREA URNS HPF: NORMAL /HPF (ref 0–5)
SODIUM SERPL-SCNC: 135 MMOL/L (ref 136–145)
SP GR UR REFRACTOMETRY: 1.01
SPECIMEN HOLD: NORMAL
URINE CULTURE IF INDICATED: NORMAL
UROBILINOGEN UR QL STRIP.AUTO: 1 EU/DL (ref 0.2–1)
WBC # BLD AUTO: 11.7 K/UL (ref 3.6–11)
WBC URNS QL MICRO: NORMAL /HPF (ref 0–4)

## 2023-08-21 PROCEDURE — 99285 EMERGENCY DEPT VISIT HI MDM: CPT

## 2023-08-21 PROCEDURE — 80053 COMPREHEN METABOLIC PANEL: CPT

## 2023-08-21 PROCEDURE — 71045 X-RAY EXAM CHEST 1 VIEW: CPT

## 2023-08-21 PROCEDURE — 81001 URINALYSIS AUTO W/SCOPE: CPT

## 2023-08-21 PROCEDURE — 93005 ELECTROCARDIOGRAM TRACING: CPT | Performed by: EMERGENCY MEDICINE

## 2023-08-21 PROCEDURE — 86901 BLOOD TYPING SEROLOGIC RH(D): CPT

## 2023-08-21 PROCEDURE — 51702 INSERT TEMP BLADDER CATH: CPT

## 2023-08-21 PROCEDURE — 85610 PROTHROMBIN TIME: CPT

## 2023-08-21 PROCEDURE — 73502 X-RAY EXAM HIP UNI 2-3 VIEWS: CPT

## 2023-08-21 PROCEDURE — 86850 RBC ANTIBODY SCREEN: CPT

## 2023-08-21 PROCEDURE — 36415 COLL VENOUS BLD VENIPUNCTURE: CPT

## 2023-08-21 PROCEDURE — 86900 BLOOD TYPING SEROLOGIC ABO: CPT

## 2023-08-21 PROCEDURE — 85025 COMPLETE CBC W/AUTO DIFF WBC: CPT

## 2023-08-21 RX ORDER — OXYCODONE HYDROCHLORIDE 5 MG/1
2.5 TABLET ORAL EVERY 4 HOURS PRN
Status: DISCONTINUED | OUTPATIENT
Start: 2023-08-21 | End: 2023-08-26 | Stop reason: HOSPADM

## 2023-08-21 RX ORDER — NALOXONE HYDROCHLORIDE 0.4 MG/ML
0.4 INJECTION, SOLUTION INTRAMUSCULAR; INTRAVENOUS; SUBCUTANEOUS PRN
Status: DISCONTINUED | OUTPATIENT
Start: 2023-08-21 | End: 2023-08-26 | Stop reason: HOSPADM

## 2023-08-21 RX ORDER — OXYCODONE HYDROCHLORIDE 5 MG/1
5 TABLET ORAL EVERY 4 HOURS PRN
Status: DISCONTINUED | OUTPATIENT
Start: 2023-08-21 | End: 2023-08-22 | Stop reason: ALTCHOICE

## 2023-08-21 ASSESSMENT — PAIN SCALES - GENERAL: PAINLEVEL_OUTOF10: 8

## 2023-08-21 ASSESSMENT — PAIN - FUNCTIONAL ASSESSMENT: PAIN_FUNCTIONAL_ASSESSMENT: 0-10

## 2023-08-22 ENCOUNTER — APPOINTMENT (OUTPATIENT)
Facility: HOSPITAL | Age: 88
End: 2023-08-22
Payer: MEDICARE

## 2023-08-22 ENCOUNTER — ANESTHESIA (OUTPATIENT)
Facility: HOSPITAL | Age: 88
End: 2023-08-22
Payer: MEDICARE

## 2023-08-22 ENCOUNTER — ANESTHESIA EVENT (OUTPATIENT)
Facility: HOSPITAL | Age: 88
End: 2023-08-22
Payer: MEDICARE

## 2023-08-22 PROBLEM — S72.001A CLOSED RIGHT HIP FRACTURE, INITIAL ENCOUNTER (HCC): Status: ACTIVE | Noted: 2023-08-22

## 2023-08-22 LAB
DIGOXIN SERPL-MCNC: 0.5 NG/ML (ref 0.9–2)
EKG ATRIAL RATE: 85 BPM
EKG DIAGNOSIS: NORMAL
EKG P AXIS: 79 DEGREES
EKG P-R INTERVAL: 212 MS
EKG Q-T INTERVAL: 370 MS
EKG QRS DURATION: 74 MS
EKG QTC CALCULATION (BAZETT): 440 MS
EKG R AXIS: 11 DEGREES
EKG T AXIS: 51 DEGREES
EKG VENTRICULAR RATE: 85 BPM
GLUCOSE BLD STRIP.AUTO-MCNC: 150 MG/DL (ref 65–117)
GLUCOSE BLD STRIP.AUTO-MCNC: 223 MG/DL (ref 65–117)
GLUCOSE BLD STRIP.AUTO-MCNC: 226 MG/DL (ref 65–117)
GLUCOSE BLD STRIP.AUTO-MCNC: 227 MG/DL (ref 65–117)
GLUCOSE BLD STRIP.AUTO-MCNC: 260 MG/DL (ref 65–117)
SERVICE CMNT-IMP: ABNORMAL

## 2023-08-22 PROCEDURE — 3700000001 HC ADD 15 MINUTES (ANESTHESIA): Performed by: ORTHOPAEDIC SURGERY

## 2023-08-22 PROCEDURE — 2580000003 HC RX 258: Performed by: ORTHOPAEDIC SURGERY

## 2023-08-22 PROCEDURE — 7100000000 HC PACU RECOVERY - FIRST 15 MIN: Performed by: ORTHOPAEDIC SURGERY

## 2023-08-22 PROCEDURE — 6370000000 HC RX 637 (ALT 250 FOR IP)

## 2023-08-22 PROCEDURE — 2709999900 HC NON-CHARGEABLE SUPPLY: Performed by: ORTHOPAEDIC SURGERY

## 2023-08-22 PROCEDURE — 6370000000 HC RX 637 (ALT 250 FOR IP): Performed by: STUDENT IN AN ORGANIZED HEALTH CARE EDUCATION/TRAINING PROGRAM

## 2023-08-22 PROCEDURE — 6370000000 HC RX 637 (ALT 250 FOR IP): Performed by: ORTHOPAEDIC SURGERY

## 2023-08-22 PROCEDURE — 6360000002 HC RX W HCPCS: Performed by: ORTHOPAEDIC SURGERY

## 2023-08-22 PROCEDURE — 0SRR01A REPLACEMENT OF RIGHT HIP JOINT, FEMORAL SURFACE WITH METAL SYNTHETIC SUBSTITUTE, UNCEMENTED, OPEN APPROACH: ICD-10-PCS | Performed by: ORTHOPAEDIC SURGERY

## 2023-08-22 PROCEDURE — 3600000005 HC SURGERY LEVEL 5 BASE: Performed by: ORTHOPAEDIC SURGERY

## 2023-08-22 PROCEDURE — 6360000002 HC RX W HCPCS: Performed by: NURSE ANESTHETIST, CERTIFIED REGISTERED

## 2023-08-22 PROCEDURE — 3600000015 HC SURGERY LEVEL 5 ADDTL 15MIN: Performed by: ORTHOPAEDIC SURGERY

## 2023-08-22 PROCEDURE — 36415 COLL VENOUS BLD VENIPUNCTURE: CPT

## 2023-08-22 PROCEDURE — C1776 JOINT DEVICE (IMPLANTABLE): HCPCS | Performed by: ORTHOPAEDIC SURGERY

## 2023-08-22 PROCEDURE — 6360000002 HC RX W HCPCS: Performed by: STUDENT IN AN ORGANIZED HEALTH CARE EDUCATION/TRAINING PROGRAM

## 2023-08-22 PROCEDURE — 73502 X-RAY EXAM HIP UNI 2-3 VIEWS: CPT

## 2023-08-22 PROCEDURE — 3700000000 HC ANESTHESIA ATTENDED CARE: Performed by: ORTHOPAEDIC SURGERY

## 2023-08-22 PROCEDURE — 7100000001 HC PACU RECOVERY - ADDTL 15 MIN: Performed by: ORTHOPAEDIC SURGERY

## 2023-08-22 PROCEDURE — 2500000003 HC RX 250 WO HCPCS: Performed by: NURSE ANESTHETIST, CERTIFIED REGISTERED

## 2023-08-22 PROCEDURE — 2580000003 HC RX 258: Performed by: STUDENT IN AN ORGANIZED HEALTH CARE EDUCATION/TRAINING PROGRAM

## 2023-08-22 PROCEDURE — 80162 ASSAY OF DIGOXIN TOTAL: CPT

## 2023-08-22 PROCEDURE — 1100000003 HC PRIVATE W/ TELEMETRY

## 2023-08-22 PROCEDURE — 99221 1ST HOSP IP/OBS SF/LOW 40: CPT | Performed by: ORTHOPAEDIC SURGERY

## 2023-08-22 PROCEDURE — 82962 GLUCOSE BLOOD TEST: CPT

## 2023-08-22 DEVICE — IMPL CAPPED H4 HEMI UNI/BIPOLAR EXACTECH: Type: IMPLANTABLE DEVICE | Site: HIP | Status: FUNCTIONAL

## 2023-08-22 DEVICE — IMPLANTABLE DEVICE
Type: IMPLANTABLE DEVICE | Site: HIP | Status: FUNCTIONAL
Brand: ACUMATCH L-SERIES

## 2023-08-22 DEVICE — IMPLANTABLE DEVICE
Type: IMPLANTABLE DEVICE | Site: HIP | Status: FUNCTIONAL
Brand: NOVATION

## 2023-08-22 RX ORDER — MORPHINE SULFATE 2 MG/ML
2 INJECTION, SOLUTION INTRAMUSCULAR; INTRAVENOUS
Status: DISCONTINUED | OUTPATIENT
Start: 2023-08-22 | End: 2023-08-26 | Stop reason: HOSPADM

## 2023-08-22 RX ORDER — SODIUM CHLORIDE 9 MG/ML
INJECTION, SOLUTION INTRAVENOUS PRN
Status: DISCONTINUED | OUTPATIENT
Start: 2023-08-22 | End: 2023-08-26 | Stop reason: HOSPADM

## 2023-08-22 RX ORDER — SODIUM CHLORIDE 0.9 % (FLUSH) 0.9 %
5-40 SYRINGE (ML) INJECTION PRN
Status: DISCONTINUED | OUTPATIENT
Start: 2023-08-22 | End: 2023-08-22 | Stop reason: SDUPTHER

## 2023-08-22 RX ORDER — DIGOXIN 125 MCG
0.12 TABLET ORAL DAILY
Status: DISCONTINUED | OUTPATIENT
Start: 2023-08-22 | End: 2023-08-26 | Stop reason: HOSPADM

## 2023-08-22 RX ORDER — FENTANYL CITRATE 50 UG/ML
INJECTION, SOLUTION INTRAMUSCULAR; INTRAVENOUS PRN
Status: DISCONTINUED | OUTPATIENT
Start: 2023-08-22 | End: 2023-08-22 | Stop reason: SDUPTHER

## 2023-08-22 RX ORDER — SODIUM CHLORIDE, SODIUM LACTATE, POTASSIUM CHLORIDE, CALCIUM CHLORIDE 600; 310; 30; 20 MG/100ML; MG/100ML; MG/100ML; MG/100ML
INJECTION, SOLUTION INTRAVENOUS CONTINUOUS
Status: DISCONTINUED | OUTPATIENT
Start: 2023-08-22 | End: 2023-08-22 | Stop reason: ALTCHOICE

## 2023-08-22 RX ORDER — POLYETHYLENE GLYCOL 3350 17 G/17G
17 POWDER, FOR SOLUTION ORAL DAILY PRN
Status: DISCONTINUED | OUTPATIENT
Start: 2023-08-22 | End: 2023-08-22 | Stop reason: ALTCHOICE

## 2023-08-22 RX ORDER — SODIUM CHLORIDE 0.9 % (FLUSH) 0.9 %
5-40 SYRINGE (ML) INJECTION EVERY 12 HOURS SCHEDULED
Status: DISCONTINUED | OUTPATIENT
Start: 2023-08-22 | End: 2023-08-22 | Stop reason: HOSPADM

## 2023-08-22 RX ORDER — POLYETHYLENE GLYCOL 3350 17 G/17G
17 POWDER, FOR SOLUTION ORAL DAILY
Status: DISCONTINUED | OUTPATIENT
Start: 2023-08-22 | End: 2023-08-26 | Stop reason: HOSPADM

## 2023-08-22 RX ORDER — MORPHINE SULFATE 10 MG/ML
INJECTION INTRAVENOUS PRN
Status: DISCONTINUED | OUTPATIENT
Start: 2023-08-22 | End: 2023-08-22 | Stop reason: SDUPTHER

## 2023-08-22 RX ORDER — FAMOTIDINE 20 MG/1
20 TABLET, FILM COATED ORAL 2 TIMES DAILY
Status: DISCONTINUED | OUTPATIENT
Start: 2023-08-22 | End: 2023-08-23

## 2023-08-22 RX ORDER — BISACODYL 5 MG/1
5 TABLET, DELAYED RELEASE ORAL DAILY
Status: DISCONTINUED | OUTPATIENT
Start: 2023-08-22 | End: 2023-08-26 | Stop reason: HOSPADM

## 2023-08-22 RX ORDER — OXYCODONE HYDROCHLORIDE 5 MG/1
10 TABLET ORAL EVERY 4 HOURS PRN
Status: DISCONTINUED | OUTPATIENT
Start: 2023-08-22 | End: 2023-08-26 | Stop reason: HOSPADM

## 2023-08-22 RX ORDER — PROCHLORPERAZINE EDISYLATE 5 MG/ML
5 INJECTION INTRAMUSCULAR; INTRAVENOUS
Status: DISCONTINUED | OUTPATIENT
Start: 2023-08-22 | End: 2023-08-22 | Stop reason: HOSPADM

## 2023-08-22 RX ORDER — DIPHENHYDRAMINE HYDROCHLORIDE 50 MG/ML
12.5 INJECTION INTRAMUSCULAR; INTRAVENOUS
Status: DISCONTINUED | OUTPATIENT
Start: 2023-08-22 | End: 2023-08-22 | Stop reason: HOSPADM

## 2023-08-22 RX ORDER — ONDANSETRON 4 MG/1
4 TABLET, ORALLY DISINTEGRATING ORAL EVERY 8 HOURS PRN
Status: DISCONTINUED | OUTPATIENT
Start: 2023-08-22 | End: 2023-08-22 | Stop reason: ALTCHOICE

## 2023-08-22 RX ORDER — ONDANSETRON 2 MG/ML
4 INJECTION INTRAMUSCULAR; INTRAVENOUS
Status: DISCONTINUED | OUTPATIENT
Start: 2023-08-22 | End: 2023-08-22 | Stop reason: SDUPTHER

## 2023-08-22 RX ORDER — FENTANYL CITRATE 50 UG/ML
50 INJECTION, SOLUTION INTRAMUSCULAR; INTRAVENOUS EVERY 5 MIN PRN
Status: DISCONTINUED | OUTPATIENT
Start: 2023-08-22 | End: 2023-08-22 | Stop reason: HOSPADM

## 2023-08-22 RX ORDER — VANCOMYCIN HYDROCHLORIDE 1 G/20ML
INJECTION, POWDER, LYOPHILIZED, FOR SOLUTION INTRAVENOUS PRN
Status: DISCONTINUED | OUTPATIENT
Start: 2023-08-22 | End: 2023-08-22 | Stop reason: ALTCHOICE

## 2023-08-22 RX ORDER — HYDRALAZINE HYDROCHLORIDE 20 MG/ML
10 INJECTION INTRAMUSCULAR; INTRAVENOUS
Status: DISCONTINUED | OUTPATIENT
Start: 2023-08-22 | End: 2023-08-22 | Stop reason: HOSPADM

## 2023-08-22 RX ORDER — ONDANSETRON 4 MG/1
4 TABLET, ORALLY DISINTEGRATING ORAL EVERY 8 HOURS PRN
Status: DISCONTINUED | OUTPATIENT
Start: 2023-08-22 | End: 2023-08-26 | Stop reason: HOSPADM

## 2023-08-22 RX ORDER — SODIUM CHLORIDE 9 MG/ML
INJECTION, SOLUTION INTRAVENOUS PRN
Status: DISCONTINUED | OUTPATIENT
Start: 2023-08-22 | End: 2023-08-22 | Stop reason: HOSPADM

## 2023-08-22 RX ORDER — INSULIN LISPRO 100 [IU]/ML
0-4 INJECTION, SOLUTION INTRAVENOUS; SUBCUTANEOUS NIGHTLY
Status: DISCONTINUED | OUTPATIENT
Start: 2023-08-22 | End: 2023-08-24

## 2023-08-22 RX ORDER — DEXAMETHASONE SODIUM PHOSPHATE 4 MG/ML
INJECTION, SOLUTION INTRA-ARTICULAR; INTRALESIONAL; INTRAMUSCULAR; INTRAVENOUS; SOFT TISSUE PRN
Status: DISCONTINUED | OUTPATIENT
Start: 2023-08-22 | End: 2023-08-22 | Stop reason: SDUPTHER

## 2023-08-22 RX ORDER — HYDROXYZINE HYDROCHLORIDE 10 MG/1
10 TABLET, FILM COATED ORAL EVERY 8 HOURS PRN
Status: DISCONTINUED | OUTPATIENT
Start: 2023-08-22 | End: 2023-08-26 | Stop reason: HOSPADM

## 2023-08-22 RX ORDER — ACETAMINOPHEN 325 MG/1
650 TABLET ORAL EVERY 6 HOURS
Status: DISCONTINUED | OUTPATIENT
Start: 2023-08-22 | End: 2023-08-26 | Stop reason: HOSPADM

## 2023-08-22 RX ORDER — CEFAZOLIN SODIUM 1 G/3ML
INJECTION, POWDER, FOR SOLUTION INTRAMUSCULAR; INTRAVENOUS PRN
Status: DISCONTINUED | OUTPATIENT
Start: 2023-08-22 | End: 2023-08-22 | Stop reason: SDUPTHER

## 2023-08-22 RX ORDER — MORPHINE SULFATE 2 MG/ML
4 INJECTION, SOLUTION INTRAMUSCULAR; INTRAVENOUS
Status: DISCONTINUED | OUTPATIENT
Start: 2023-08-22 | End: 2023-08-26 | Stop reason: HOSPADM

## 2023-08-22 RX ORDER — ACETAMINOPHEN 650 MG/1
650 SUPPOSITORY RECTAL EVERY 6 HOURS PRN
Status: DISCONTINUED | OUTPATIENT
Start: 2023-08-22 | End: 2023-08-26 | Stop reason: HOSPADM

## 2023-08-22 RX ORDER — LABETALOL HYDROCHLORIDE 5 MG/ML
INJECTION, SOLUTION INTRAVENOUS PRN
Status: DISCONTINUED | OUTPATIENT
Start: 2023-08-22 | End: 2023-08-22 | Stop reason: SDUPTHER

## 2023-08-22 RX ORDER — ROCURONIUM BROMIDE 10 MG/ML
INJECTION, SOLUTION INTRAVENOUS PRN
Status: DISCONTINUED | OUTPATIENT
Start: 2023-08-22 | End: 2023-08-22 | Stop reason: SDUPTHER

## 2023-08-22 RX ORDER — ENOXAPARIN SODIUM 100 MG/ML
40 INJECTION SUBCUTANEOUS DAILY
Status: DISCONTINUED | OUTPATIENT
Start: 2023-08-22 | End: 2023-08-26 | Stop reason: HOSPADM

## 2023-08-22 RX ORDER — CASTOR OIL AND BALSAM, PERU 788; 87 MG/G; MG/G
OINTMENT TOPICAL 2 TIMES DAILY
Status: DISCONTINUED | OUTPATIENT
Start: 2023-08-22 | End: 2023-08-26 | Stop reason: HOSPADM

## 2023-08-22 RX ORDER — FENTANYL CITRATE 50 UG/ML
50 INJECTION, SOLUTION INTRAMUSCULAR; INTRAVENOUS
Status: DISCONTINUED | OUTPATIENT
Start: 2023-08-22 | End: 2023-08-23

## 2023-08-22 RX ORDER — ACETAMINOPHEN 325 MG/1
650 TABLET ORAL EVERY 6 HOURS PRN
Status: DISCONTINUED | OUTPATIENT
Start: 2023-08-22 | End: 2023-08-26 | Stop reason: HOSPADM

## 2023-08-22 RX ORDER — SODIUM CHLORIDE 0.9 % (FLUSH) 0.9 %
5-40 SYRINGE (ML) INJECTION EVERY 12 HOURS SCHEDULED
Status: DISCONTINUED | OUTPATIENT
Start: 2023-08-22 | End: 2023-08-26 | Stop reason: HOSPADM

## 2023-08-22 RX ORDER — DEXTROSE MONOHYDRATE 100 MG/ML
INJECTION, SOLUTION INTRAVENOUS CONTINUOUS PRN
Status: DISCONTINUED | OUTPATIENT
Start: 2023-08-22 | End: 2023-08-26 | Stop reason: HOSPADM

## 2023-08-22 RX ORDER — INSULIN LISPRO 100 [IU]/ML
INJECTION, SOLUTION INTRAVENOUS; SUBCUTANEOUS
Status: COMPLETED
Start: 2023-08-22 | End: 2023-08-22

## 2023-08-22 RX ORDER — LABETALOL HYDROCHLORIDE 5 MG/ML
INJECTION, SOLUTION INTRAVENOUS
Status: COMPLETED
Start: 2023-08-22 | End: 2023-08-22

## 2023-08-22 RX ORDER — SODIUM CHLORIDE 9 MG/ML
INJECTION, SOLUTION INTRAVENOUS CONTINUOUS
Status: DISCONTINUED | OUTPATIENT
Start: 2023-08-22 | End: 2023-08-26 | Stop reason: HOSPADM

## 2023-08-22 RX ORDER — SENNA AND DOCUSATE SODIUM 50; 8.6 MG/1; MG/1
1 TABLET, FILM COATED ORAL 2 TIMES DAILY
Status: DISCONTINUED | OUTPATIENT
Start: 2023-08-22 | End: 2023-08-26 | Stop reason: HOSPADM

## 2023-08-22 RX ORDER — SODIUM CHLORIDE 0.9 % (FLUSH) 0.9 %
5-40 SYRINGE (ML) INJECTION PRN
Status: DISCONTINUED | OUTPATIENT
Start: 2023-08-22 | End: 2023-08-26 | Stop reason: HOSPADM

## 2023-08-22 RX ORDER — OXYCODONE HYDROCHLORIDE 5 MG/1
5 TABLET ORAL EVERY 4 HOURS PRN
Status: DISCONTINUED | OUTPATIENT
Start: 2023-08-22 | End: 2023-08-26 | Stop reason: HOSPADM

## 2023-08-22 RX ORDER — SODIUM CHLORIDE 0.9 % (FLUSH) 0.9 %
5-40 SYRINGE (ML) INJECTION EVERY 12 HOURS SCHEDULED
Status: DISCONTINUED | OUTPATIENT
Start: 2023-08-22 | End: 2023-08-22 | Stop reason: SDUPTHER

## 2023-08-22 RX ORDER — INSULIN LISPRO 100 [IU]/ML
0-8 INJECTION, SOLUTION INTRAVENOUS; SUBCUTANEOUS
Status: DISCONTINUED | OUTPATIENT
Start: 2023-08-22 | End: 2023-08-24

## 2023-08-22 RX ORDER — SODIUM CHLORIDE, SODIUM LACTATE, POTASSIUM CHLORIDE, CALCIUM CHLORIDE 600; 310; 30; 20 MG/100ML; MG/100ML; MG/100ML; MG/100ML
INJECTION, SOLUTION INTRAVENOUS CONTINUOUS
Status: DISCONTINUED | OUTPATIENT
Start: 2023-08-22 | End: 2023-08-26 | Stop reason: HOSPADM

## 2023-08-22 RX ORDER — GLYCOPYRROLATE 0.2 MG/ML
INJECTION INTRAMUSCULAR; INTRAVENOUS PRN
Status: DISCONTINUED | OUTPATIENT
Start: 2023-08-22 | End: 2023-08-22 | Stop reason: SDUPTHER

## 2023-08-22 RX ORDER — ONDANSETRON 2 MG/ML
4 INJECTION INTRAMUSCULAR; INTRAVENOUS EVERY 6 HOURS PRN
Status: DISCONTINUED | OUTPATIENT
Start: 2023-08-22 | End: 2023-08-26 | Stop reason: HOSPADM

## 2023-08-22 RX ORDER — PHENYLEPHRINE HCL IN 0.9% NACL 0.4MG/10ML
SYRINGE (ML) INTRAVENOUS PRN
Status: DISCONTINUED | OUTPATIENT
Start: 2023-08-22 | End: 2023-08-22 | Stop reason: SDUPTHER

## 2023-08-22 RX ORDER — MIDAZOLAM HYDROCHLORIDE 1 MG/ML
2 INJECTION, SOLUTION INTRAMUSCULAR; INTRAVENOUS
Status: DISCONTINUED | OUTPATIENT
Start: 2023-08-22 | End: 2023-08-22 | Stop reason: HOSPADM

## 2023-08-22 RX ORDER — LIDOCAINE HYDROCHLORIDE 20 MG/ML
INJECTION, SOLUTION EPIDURAL; INFILTRATION; INTRACAUDAL; PERINEURAL PRN
Status: DISCONTINUED | OUTPATIENT
Start: 2023-08-22 | End: 2023-08-22 | Stop reason: SDUPTHER

## 2023-08-22 RX ORDER — ONDANSETRON 2 MG/ML
4 INJECTION INTRAMUSCULAR; INTRAVENOUS EVERY 6 HOURS PRN
Status: DISCONTINUED | OUTPATIENT
Start: 2023-08-22 | End: 2023-08-22 | Stop reason: ALTCHOICE

## 2023-08-22 RX ORDER — NEOSTIGMINE METHYLSULFATE 1 MG/ML
INJECTION, SOLUTION INTRAVENOUS PRN
Status: DISCONTINUED | OUTPATIENT
Start: 2023-08-22 | End: 2023-08-22 | Stop reason: SDUPTHER

## 2023-08-22 RX ORDER — HYDROMORPHONE HYDROCHLORIDE 1 MG/ML
0.25 INJECTION, SOLUTION INTRAMUSCULAR; INTRAVENOUS; SUBCUTANEOUS EVERY 5 MIN PRN
Status: DISCONTINUED | OUTPATIENT
Start: 2023-08-22 | End: 2023-08-22 | Stop reason: HOSPADM

## 2023-08-22 RX ORDER — SODIUM CHLORIDE 0.9 % (FLUSH) 0.9 %
5-40 SYRINGE (ML) INJECTION PRN
Status: DISCONTINUED | OUTPATIENT
Start: 2023-08-22 | End: 2023-08-22 | Stop reason: ALTCHOICE

## 2023-08-22 RX ADMIN — Medication: at 22:20

## 2023-08-22 RX ADMIN — MORPHINE SULFATE 2 MG: 10 INJECTION INTRAVENOUS at 17:27

## 2023-08-22 RX ADMIN — Medication 4 UNITS: at 19:44

## 2023-08-22 RX ADMIN — Medication 40 MCG: at 17:40

## 2023-08-22 RX ADMIN — SODIUM CHLORIDE, PRESERVATIVE FREE 10 ML: 5 INJECTION INTRAVENOUS at 22:18

## 2023-08-22 RX ADMIN — OXYCODONE HYDROCHLORIDE 5 MG: 5 TABLET ORAL at 00:30

## 2023-08-22 RX ADMIN — Medication 40 MCG: at 17:04

## 2023-08-22 RX ADMIN — GLYCOPYRROLATE 0.7 MG: 0.2 INJECTION, SOLUTION INTRAMUSCULAR; INTRAVENOUS at 18:04

## 2023-08-22 RX ADMIN — CEFAZOLIN 2 G: 1 INJECTION, POWDER, FOR SOLUTION INTRAMUSCULAR; INTRAVENOUS; PARENTERAL at 16:48

## 2023-08-22 RX ADMIN — FENTANYL CITRATE 25 MCG: 50 INJECTION, SOLUTION INTRAMUSCULAR; INTRAVENOUS at 17:11

## 2023-08-22 RX ADMIN — Medication: at 10:06

## 2023-08-22 RX ADMIN — LABETALOL HYDROCHLORIDE 10 MG: 5 INJECTION, SOLUTION INTRAVENOUS at 18:35

## 2023-08-22 RX ADMIN — DEXAMETHASONE SODIUM PHOSPHATE 4 MG: 4 INJECTION, SOLUTION INTRAMUSCULAR; INTRAVENOUS at 16:45

## 2023-08-22 RX ADMIN — SODIUM CHLORIDE, PRESERVATIVE FREE 10 ML: 5 INJECTION INTRAVENOUS at 10:09

## 2023-08-22 RX ADMIN — FENTANYL CITRATE 25 MCG: 50 INJECTION, SOLUTION INTRAMUSCULAR; INTRAVENOUS at 16:47

## 2023-08-22 RX ADMIN — FAMOTIDINE 20 MG: 20 TABLET, FILM COATED ORAL at 22:16

## 2023-08-22 RX ADMIN — Medication 40 MCG: at 17:35

## 2023-08-22 RX ADMIN — MORPHINE SULFATE 1 MG: 10 INJECTION INTRAVENOUS at 18:17

## 2023-08-22 RX ADMIN — PROPOFOL 60 MG: 10 INJECTION, EMULSION INTRAVENOUS at 16:42

## 2023-08-22 RX ADMIN — SODIUM CHLORIDE: 9 INJECTION, SOLUTION INTRAVENOUS at 21:38

## 2023-08-22 RX ADMIN — ROCURONIUM BROMIDE 40 MG: 10 INJECTION INTRAVENOUS at 16:43

## 2023-08-22 RX ADMIN — SENNOSIDES AND DOCUSATE SODIUM 1 TABLET: 50; 8.6 TABLET ORAL at 22:16

## 2023-08-22 RX ADMIN — Medication 4 MG: at 18:04

## 2023-08-22 RX ADMIN — DIGOXIN 0.12 MG: 125 TABLET ORAL at 10:04

## 2023-08-22 RX ADMIN — ROCURONIUM BROMIDE 10 MG: 10 INJECTION INTRAVENOUS at 17:27

## 2023-08-22 RX ADMIN — FENTANYL CITRATE 25 MCG: 50 INJECTION, SOLUTION INTRAMUSCULAR; INTRAVENOUS at 16:42

## 2023-08-22 RX ADMIN — Medication 40 MCG: at 17:26

## 2023-08-22 RX ADMIN — SODIUM CHLORIDE, POTASSIUM CHLORIDE, SODIUM LACTATE AND CALCIUM CHLORIDE: 600; 310; 30; 20 INJECTION, SOLUTION INTRAVENOUS at 14:30

## 2023-08-22 RX ADMIN — LIDOCAINE HYDROCHLORIDE 60 MG: 20 INJECTION, SOLUTION EPIDURAL; INFILTRATION; INTRACAUDAL; PERINEURAL at 16:42

## 2023-08-22 RX ADMIN — PROPOFOL 10 MG: 10 INJECTION, EMULSION INTRAVENOUS at 16:43

## 2023-08-22 RX ADMIN — ONDANSETRON HYDROCHLORIDE 4 MG: 2 INJECTION, SOLUTION INTRAMUSCULAR; INTRAVENOUS at 16:36

## 2023-08-22 RX ADMIN — ENOXAPARIN SODIUM 40 MG: 100 INJECTION SUBCUTANEOUS at 22:16

## 2023-08-22 RX ADMIN — Medication 2 UNITS: at 10:09

## 2023-08-22 RX ADMIN — FENTANYL CITRATE 25 MCG: 50 INJECTION, SOLUTION INTRAMUSCULAR; INTRAVENOUS at 16:36

## 2023-08-22 RX ADMIN — Medication 80 MCG: at 16:56

## 2023-08-22 RX ADMIN — OXYCODONE HYDROCHLORIDE 5 MG: 5 TABLET ORAL at 10:05

## 2023-08-22 ASSESSMENT — PAIN SCALES - GENERAL
PAINLEVEL_OUTOF10: 4
PAINLEVEL_OUTOF10: 7
PAINLEVEL_OUTOF10: 0
PAINLEVEL_OUTOF10: 5

## 2023-08-22 ASSESSMENT — PAIN DESCRIPTION - DESCRIPTORS: DESCRIPTORS: ACHING

## 2023-08-22 ASSESSMENT — PAIN - FUNCTIONAL ASSESSMENT: PAIN_FUNCTIONAL_ASSESSMENT: 0-10

## 2023-08-22 NOTE — ED PROVIDER NOTES
[facility] EMERGENCY DEPT  EMERGENCY DEPARTMENT ENCOUNTER       Pt Name: Gilda Reyes  MRN: [id number]  [address] [facility] 1935  Date of evaluation: 8/21/2023  Provider: Catracho Flor MD   PCP: None None (Inactive)  Note Started: 10:56 PM EDT 8/21/23     CHIEF COMPLAINT       Chief Complaint   Patient presents with    Hip Pain     Pt arrives via EMS, states she was sitting on a stool at home trying to put on her sock and fell out of the stool after losing her balance. Denies head injury. Does states she has 8/10 right hip pain. HISTORY OF PRESENT ILLNESS: 1 or more elements      History From: patient, History limited by: none     Gilda Reyes is a 81 y.o. female who presents with a cc of R hip pain after a mechanical fall       Please See MDM for Additional Details of the HPI/PMH  Nursing Notes were all reviewed and agreed with or any disagreements were addressed in the HPI. REVIEW OF SYSTEMS        Positives and Pertinent negatives as per HPI. PAST HISTORY     Past Medical History:  Past Medical History:   Diagnosis Date    Arrhythmia     PALPITATIONS    Bronchitis     Chronic pain     RT. SHOULDER    Diabetes ([address])     TYPE II    History of blood transfusion 1960'S    [place], [place], NO REACTION       Past Surgical History:  Past Surgical History:   Procedure Laterality Date    DILATION AND CURETTAGE OF UTERUS  1960'S    HYSTERECTOMY (CERVIX STATUS UNKNOWN)  1984    TONSILLECTOMY  1956    UROLOGICAL SURGERY  2009    BLADDER TUCK       Family History:  Family History   Problem Relation Age of Onset    Cancer Mother         UTERINE    Heart Disease Father        Social History:  Social History     Tobacco Use    Smoking status: Never    Smokeless tobacco: Never   Substance Use Topics    Alcohol use: No    Drug use: No       Allergies:   Allergies   Allergen Reactions    Clarithromycin Other (See Comments)     PSYCHOTIC AFFECT    Shellfish Allergy Nausea And Vomiting and Swelling     Swelling of admission have been discussed with pt and/or available family. They convey agreement and understanding for the need to be admitted and for the admission diagnosis. I am the Primary Clinician of Record. Marco Antonio Reich MD (electronically signed)    (Please note that parts of this dictation were completed with voice recognition software. Quite often unanticipated grammatical, syntax, homophones, and other interpretive errors are inadvertently transcribed by the computer software. Please disregards these errors.  Please excuse any errors that have escaped final proofreading.)         Lisa Lorenz MD  08/22/23 6471

## 2023-08-22 NOTE — CARE COORDINATION
Care Management Initial Assessment       RUR:  12%   Readmission? No  1st IM letter given? Yes - 8/22/23  1st  letter given: No         08/22/23 1021   Service Assessment   Patient Orientation Alert and Oriented;Person;Place;Situation;Self   Cognition Alert   History Provided By Patient   [address] is: Legal Next of Kin   PCP Verified by CM Yes  (Pt wants a PCP set up before d/c.)   Prior Functional Level Independent in ADLs/IADLs   Current Functional Level Assistance with the following:;Bathing;Dressing; Toileting;Mobility   Can patient return to prior living arrangement Yes   Ability to make needs known: Fair   Family able to assist with home care needs: Yes   Would you like for me to discuss the discharge plan with any other family members/significant others, and if so, who? Yes  (spouse)   Financial Resources Medicare   Community Resources Other (Comment)  (helpful daughter-in-law)   Social/Functional History   Lives With Significant other   Type of Home House  (2 floors with 4 ELDER)   [address]   Active  Yes   Discharge Planning   Type of Residence Other (Comment)  (anticipate SNF for rehab)         CM met with pt at bedside to introduce self/role, verify demographics and complete initial assessment. Pt lives at home with her spouse. Pt uses the [address]. Pt has a supportive son and daughter-in-law. Pt denied a hx of HH, SNF or IPR. Pt will need BLS transport at d/c  if she goes to SNF.     Teagan Ron, RENAY  Supervisee in [facility], [address] [place] [address]

## 2023-08-22 NOTE — PERIOP NOTE
14:29= brought to Pre Op via bed from room 112; A&Ox4, consents verified (2); declined warming blanket; CHG used on RLE per protocol. 14:45= Dr. Arturo Hernandez in to discuss Anesthesia.

## 2023-08-22 NOTE — OP NOTE
Description of Procedure:       INDICATIONS: The patient is an 81 yrs female who presented to the hospital after a ground-level fall. Patient was admitted to the hospital after right femoral neck fracture was identified on x-ray. There was a discussion regarding appropriate treatment options. Patient elected to proceed with right hip hemiarthroplasty. Risks of infection, blood loss, neurovascular injury, anesthesia risk, and risk secondary to patient comorbidities were described. Once patient was medically optimized and cleared for procedure we proceeded with surgical treatment. DESCRIPTION OF PROCEDURE:   The patient was taken to the a operating room. Patient was given sedation and intubated by anesthesia. IV antibiotics were administered preoperatively. Patient was carefully positioned on the operating room table in the lateral decubitus position with the right hip facing upward on the pegboard. All bony prominences were well padded and an axillary roll was placed. The right hip and thigh were prepped and draped in the usual sterile fashion. Through a lateral hip incision, I performed a posterior approach to the right hip. Short rotators and posterior capsule were reflected posteriorly. Leg lengths were checked on the table for comparison with trial reduction later during the procedure. We exposed the fractured femoral neck and used an oscillating saw to clean our neck cut. Fractured femoral head was removed from the acetabulum and we thoroughly irrigated and inspected the acetabulum to ensure no leftover bony debris. We measured and checked appropriate head size. We then turned our attention to preparation of the femur. Canal finder was used followed by a lateralizing reamer. We then began broaching. We broached all the way up to size 12 before rotational stability was achieved. Approximately 25-30 degrees of anteversion was incorporated. Calcar was planed.  The hip was reduced with a standard offset neck trial. The 43 -0 head produced the most satisfactory combination of stability, soft tissue tension and leg length. The hip had full extension and external rotation with no internal impingement. No anterior instability. Stable to straight hyperflexion and with the hip flexed 90 degrees at neutral abduction, there was greater than 60 degrees of internal rotation. Trials were removed and the wound was copiously irrigated by pulse lavage before the real components were implanted. Same leg length and stability were achieved. Hip capsule and rotators were closed with number 5 Ethibond sutures. Deep fascia was closed with a combination of heavy Vicryl sutures and a running #1 Quill suture. Skin and subcutaneous layers were closed in layered fashion with 2-0 Vicryl and staples. Sterile dressings were applied. The patient was transported to the post-anesthesia care unit in stable condition. All counts were correct at the end of the procedure.     Denise Hidalgo DO     Electronically signed by Denise Hidalgo DO on 8/22/2023 at 5:45 PM

## 2023-08-22 NOTE — PLAN OF CARE
Problem: Pain  Goal: Verbalizes/displays adequate comfort level or baseline comfort level  Outcome: Progressing  Flowsheets (Taken 8/22/2023 0819)  Verbalizes/displays adequate comfort level or baseline comfort level:   Encourage patient to monitor pain and request assistance   Assess pain using appropriate pain scale   Administer analgesics based on type and severity of pain and evaluate response   Implement non-pharmacological measures as appropriate and evaluate response   Consider cultural and social influences on pain and pain management   Notify Licensed Independent Practitioner if interventions unsuccessful or patient reports new pain     Problem: Skin/Tissue Integrity  Goal: Absence of new skin breakdown  Description: 1. Monitor for areas of redness and/or skin breakdown  2. Assess vascular access sites hourly  3. Every 4-6 hours minimum:  Change oxygen saturation probe site  4. Every 4-6 hours:  If on nasal continuous positive airway pressure, respiratory therapy assess nares and determine need for appliance change or resting period.   Outcome: Progressing     Problem: Safety - Adult  Goal: Free from fall injury  Outcome: Progressing  Flowsheets (Taken 8/22/2023 0819)  Free From Fall Injury:   Instruct family/caregiver on patient safety   Based on caregiver fall risk screen, instruct family/caregiver to ask for assistance with transferring infant if caregiver noted to have fall risk factors     Problem: ABCDS Injury Assessment  Goal: Absence of physical injury  Outcome: Progressing  Flowsheets (Taken 8/22/2023 0819)  Absence of Physical Injury: Implement safety measures based on patient assessment

## 2023-08-22 NOTE — PROGRESS NOTES
TRANSFER - IN REPORT:    Verbal report received from Maria G White on Mile Amend  being received from ED for routine progression of patient care      Report consisted of patient's Situation, Background, Assessment and   Recommendations(SBAR). Information from the following report(s) Nurse Handoff Report, ED Encounter Summary, ED SBAR, Adult Overview, Surgery Report, and MAR was reviewed with the receiving nurse. Opportunity for questions and clarification was provided. Assessment completed upon patient's arrival to unit and care assumed.

## 2023-08-22 NOTE — CONSULTS
limits. Bibasilar atelectasis or scar. The visualized bones and upper abdomen are  age-appropriate. Impression  Bibasilar atelectasis or scar. CT Result (most recent):  No results found for this or any previous visit from the past 3650 days. MRI Result (most recent):  No results found for this or any previous visit from the past 3650 days.         Labs:   Recent Results (from the past 24 hour(s))   CBC with Auto Differential    Collection Time: 08/21/23  9:53 PM   Result Value Ref Range    WBC 11.7 (H) 3.6 - 11.0 K/uL    RBC 4.23 3.80 - 5.20 M/uL    Hemoglobin 12.2 11.5 - 16.0 g/dL    Hematocrit 37.8 35.0 - 47.0 %    MCV 89.4 80.0 - 99.0 FL    MCH 28.8 26.0 - 34.0 PG    MCHC 32.3 30.0 - 36.5 g/dL    RDW 13.7 11.5 - 14.5 %    Platelets 371 692 - 284 K/uL    MPV 9.0 8.9 - 12.9 FL    Nucleated RBCs 0.0 0  WBC    nRBC 0.00 0.00 - 0.01 K/uL    Neutrophils % 75 32 - 75 %    Lymphocytes % 17 12 - 49 %    Monocytes % 6 5 - 13 %    Eosinophils % 1 0 - 7 %    Basophils % 0 0 - 1 %    Immature Granulocytes 1 (H) 0.0 - 0.5 %    Neutrophils Absolute 8.8 (H) 1.8 - 8.0 K/UL    Lymphocytes Absolute 2.0 0.8 - 3.5 K/UL    Monocytes Absolute 0.7 0.0 - 1.0 K/UL    Eosinophils Absolute 0.1 0.0 - 0.4 K/UL    Basophils Absolute 0.1 0.0 - 0.1 K/UL    Absolute Immature Granulocyte 0.1 (H) 0.00 - 0.04 K/UL    Differential Type AUTOMATED     Comprehensive Metabolic Panel w/ Reflex to MG    Collection Time: 08/21/23  9:53 PM   Result Value Ref Range    Sodium 135 (L) 136 - 145 mmol/L    Potassium 4.7 3.5 - 5.1 mmol/L    Chloride 100 97 - 108 mmol/L    CO2 30 21 - 32 mmol/L    Anion Gap 5 5 - 15 mmol/L    Glucose 204 (H) 65 - 100 mg/dL    BUN 19 6 - 20 MG/DL    Creatinine 0.67 0.55 - 1.02 MG/DL    Bun/Cre Ratio 28 (H) 12 - 20      Est, Glom Filt Rate >60 >60 ml/min/1.73m2    Calcium 9.0 8.5 - 10.1 MG/DL    Total Bilirubin 0.4 0.2 - 1.0 MG/DL    ALT 20 12 - 78 U/L    AST 15 15 - 37 U/L    Alk Phosphatase 96 45 - 117 U/L Total Protein 8.0 6.4 - 8.2 g/dL    Albumin 3.9 3.5 - 5.0 g/dL    Globulin 4.1 (H) 2.0 - 4.0 g/dL    Albumin/Globulin Ratio 1.0 (L) 1.1 - 2.2     Protime-INR    Collection Time: 08/21/23  9:53 PM   Result Value Ref Range    INR 1.1 0.9 - 1.1      Protime 11.0 9.0 - 11.1 sec   TYPE AND SCREEN    Collection Time: 08/21/23  9:53 PM   Result Value Ref Range    Crossmatch expiration date 08/24/2023,2359     ABO/Rh O NEGATIVE     Antibody Screen NEG    Urinalysis with Reflex to Culture    Collection Time: 08/21/23  9:53 PM    Specimen: Urine   Result Value Ref Range    Color, UA YELLOW/STRAW      Appearance CLEAR CLEAR      Specific Gravity, UA 1.012      pH, Urine 6.5 5.0 - 8.0      Protein, UA Negative NEG mg/dL    Glucose, UA Negative NEG mg/dL    Ketones, Urine Negative NEG mg/dL    Bilirubin Urine Negative NEG      Blood, Urine Negative NEG      Urobilinogen, Urine 1.0 0.2 - 1.0 EU/dL    Nitrite, Urine Negative NEG      Leukocyte Esterase, Urine Negative NEG      Urine Culture if Indicated CULTURE NOT INDICATED BY UA RESULT      WBC, UA 0-4 0 - 4 /hpf    RBC, UA 0-5 0 - 5 /hpf    Epithelial Cells UA FEW FEW /lpf    BACTERIA, URINE Negative NEG /hpf    Hyaline Casts, UA 0-2 0 - 2 /lpf   Extra Tubes Hold    Collection Time: 08/21/23  9:53 PM   Result Value Ref Range    Specimen HOld SST     Comment:        Add-on orders for these samples will be processed based on acceptable specimen integrity and analyte stability, which may vary by analyte.    EKG 12 Lead    Collection Time: 08/21/23 10:25 PM   Result Value Ref Range    Ventricular Rate 85 BPM    Atrial Rate 85 BPM    P-R Interval 212 ms    QRS Duration 74 ms    Q-T Interval 370 ms    QTc Calculation (Bazett) 440 ms    P Axis 79 degrees    R Axis 11 degrees    T Axis 51 degrees    Diagnosis       Sinus rhythm with 1st degree AV block  Nonspecific T wave abnormality  When compared with ECG of 13-JUL-2023 10:48,  MI interval has increased     POCT Glucose    Collection

## 2023-08-22 NOTE — CONSULTS
ORTHOPAEDIC CONSULT NOTE    Subjective:     Date of Consultation:  August 22, 2023      Milla Arciniega is a 81 y.o. female who is being seen for right femoral neck fracture. Patient fell from a stool last night resulting in immediate hip pain and inability to ambulate. Patient ambulates at baseline prior to her fall. She complains of only pain in her right hip. She denies any sensation changes, open wounds, or other areas of complaints. She denies any other symptoms at this time.  at bedside. Patient Active Problem List    Diagnosis Date Noted    Closed right hip fracture, initial encounter ([address] [facility]) 08/22/2023    Hypoxia 07/13/2023    Influenza 07/13/2023     Family History   Problem Relation Age of Onset    Cancer Mother         UTERINE    Heart Disease Father       Social History     Tobacco Use    Smoking status: Never    Smokeless tobacco: Never   Substance Use Topics    Alcohol use: No     Past Medical History:   Diagnosis Date    Arrhythmia     PALPITATIONS    Bronchitis     Chronic pain     RT. SHOULDER    Diabetes ([facility])     TYPE II    History of blood transfusion 1960'S    C[place], [place], NO REACTION      Past Surgical History:   Procedure Laterality Date    DILATION AND CURETTAGE OF UTERUS  1960'S    HYSTERECTOMY (CERVIX STATUS UNKNOWN)  1984    TONSILLECTOMY  1956    UROLOGICAL SURGERY  2009    BLADDER TUCK      Prior to Admission medications    Medication Sig Start Date End Date Taking?  Authorizing Provider   digoxin (LANOXIN) 250 MCG tablet Take 0.5 tablets by mouth daily    Ar Automatic Reconciliation   glipiZIDE (GLUCOTROL) 5 MG tablet Take 0.5 tablets by mouth 2 times daily    Ar Automatic Reconciliation   metFORMIN (GLUCOPHAGE) 500 MG tablet Take 2 tablets by mouth 2 times daily (with meals)    Ar Automatic Reconciliation     Current Facility-Administered Medications   Medication Dose Route Frequency    insulin lispro (HUMALOG) injection vial 0-8 Units  0-8 Units SubCUTAneous TID  Immature Granulocytes 1 (H) 0.0 - 0.5 %    Neutrophils Absolute 8.8 (H) 1.8 - 8.0 K/UL    Lymphocytes Absolute 2.0 0.8 - 3.5 K/UL    Monocytes Absolute 0.7 0.0 - 1.0 K/UL    Eosinophils Absolute 0.1 0.0 - 0.4 K/UL    Basophils Absolute 0.1 0.0 - 0.1 K/UL    Absolute Immature Granulocyte 0.1 (H) 0.00 - 0.04 K/UL    Differential Type AUTOMATED     Comprehensive Metabolic Panel w/ Reflex to MG    Collection Time: 08/21/23  9:53 PM   Result Value Ref Range    Sodium 135 (L) 136 - 145 mmol/L    Potassium 4.7 3.5 - 5.1 mmol/L    Chloride 100 97 - 108 mmol/L    CO2 30 21 - 32 mmol/L    Anion Gap 5 5 - 15 mmol/L    Glucose 204 (H) 65 - 100 mg/dL    BUN 19 6 - 20 MG/DL    Creatinine 0.67 0.55 - 1.02 MG/DL    Bun/Cre Ratio 28 (H) 12 - 20      Est, Glom Filt Rate >60 >60 ml/min/1.73m2    Calcium 9.0 8.5 - 10.1 MG/DL    Total Bilirubin 0.4 0.2 - 1.0 MG/DL    ALT 20 12 - 78 U/L    AST 15 15 - 37 U/L    Alk Phosphatase 96 45 - 117 U/L    Total Protein 8.0 6.4 - 8.2 g/dL    Albumin 3.9 3.5 - 5.0 g/dL    Globulin 4.1 (H) 2.0 - 4.0 g/dL    Albumin/Globulin Ratio 1.0 (L) 1.1 - 2.2     Protime-INR    Collection Time: 08/21/23  9:53 PM   Result Value Ref Range    INR 1.1 0.9 - 1.1      Protime 11.0 9.0 - 11.1 sec   TYPE AND SCREEN    Collection Time: 08/21/23  9:53 PM   Result Value Ref Range    Crossmatch expiration date 08/24/2023,2354     ABO/Rh O NEGATIVE     Antibody Screen NEG    Urinalysis with Reflex to Culture    Collection Time: 08/21/23  9:53 PM    Specimen: Urine   Result Value Ref Range    Color, UA YELLOW/STRAW      Appearance CLEAR CLEAR      Specific Gravity, UA 1.012      pH, Urine 6.5 5.0 - 8.0      Protein, UA Negative NEG mg/dL    Glucose, UA Negative NEG mg/dL    Ketones, Urine Negative NEG mg/dL    Bilirubin Urine Negative NEG      Blood, Urine Negative NEG      Urobilinogen, Urine 1.0 0.2 - 1.0 EU/dL    Nitrite, Urine Negative NEG      Leukocyte Esterase, Urine Negative NEG      Urine Culture if Indicated

## 2023-08-22 NOTE — PROGRESS NOTES
End of Shift Note    Bedside shift change report given to Ayah Patel RN (oncoming nurse) by Luz Steve RN (offgoing nurse). Report included the following information SBAR, Kardex, Procedure Summary, Intake/Output, MAR, Recent Results, and Cardiac Rhythm sinus rhythm    Shift worked:  night     Shift summary and any significant changes:     Pt came from ED this morning with a hip fracture, plan to do surgery with Dr. Jun Patel today. VS stable, pt has mclean in place for surgery, pt c/o pain, controlled with oxycodone. Concerns for physician to address:  See above     Zone phone for oncoming shift:   [phone]       Activity:     Number times ambulated in hallways past shift: 0  Number of times OOB to chair past shift: 0    Cardiac:   Cardiac Monitoring: Yes           Access:  Current line(s): PIV     Genitourinary:   Urinary status: mclean    Respiratory:      Chronic home O2 use?: NO  Incentive spirometer at bedside: NO       GI:     Current diet:  Diet NPO  Passing flatus: YES  Tolerating current diet: YES       Pain Management:   Patient states pain is manageable on current regimen: YES    Skin:     Interventions: float heels, increase time out of bed, and PT/OT consult    Patient Safety:  Fall Score:    Interventions: bed/chair alarm, assistive device (walker, cane.  etc), gripper socks, pt to call before getting OOB, and stay with me (per policy)       Length of Stay:  Expected LOS: 2  Actual LOS: 0      Luz Steve RN

## 2023-08-22 NOTE — ANESTHESIA PRE PROCEDURE
PHART, PO2ART, [id number], [id number], BEART, [id number]     Type & Screen (If Applicable):  No results found for: LABABO, LABRH    Drug/Infectious Status (If Applicable):  No results found for: HIV, HEPCAB    COVID-19 Screening (If Applicable):   Lab Results   Component Value Date/Time    COVID19 Not detected 07/13/2023 11:26 AM           Anesthesia Evaluation  Patient summary reviewed and Nursing notes reviewed  Airway: Mallampati: II  TM distance: >3 FB   Neck ROM: full  Mouth opening: > = 3 FB   Dental:    (+) edentulous      Pulmonary:Negative Pulmonary ROS and normal exam                               Cardiovascular:Negative CV ROS          ECG reviewed               Beta Blocker:  Not on Beta Blocker      ROS comment:   ECG (8/21/23): Sinus rhythm with 1st degree AV block   Nonspecific T wave abnormality   When compared with ECG of 13-JUL-2023 10:48,   NH interval has increased     Neuro/Psych:   Negative Neuro/Psych ROS              GI/Hepatic/Renal: Neg GI/Hepatic/Renal ROS            Endo/Other:    (+) DiabetesType II DM, , .                  ROS comment: Right hip fracture    Hx Hysterectomy Abdominal: normal exam            Vascular: negative vascular ROS. Other Findings:           Anesthesia Plan      general     ASA 3       Induction: intravenous. BIS  MIPS: Postoperative opioids intended and Prophylactic antiemetics administered. Anesthetic plan and risks discussed with patient. Plan discussed with CRNA.     Attending anesthesiologist reviewed and agrees with Ilia Corey MD   8/22/2023

## 2023-08-22 NOTE — H&P
significant lesions/ jaundice/ rashes noted. Neurologic:  PERRL. EOMI. No facial asymmetry. No aphasia. No slurred speech. Moves all extremities. No overt focal deficits appreciated     Psych:   Normal affect. Good insight.  Cooperative     Other:    N/A             LAB DATA REVIEWED:    Recent Results (from the past 12 hour(s))   CBC with Auto Differential    Collection Time: 08/21/23  9:53 PM   Result Value Ref Range    WBC 11.7 (H) 3.6 - 11.0 K/uL    RBC 4.23 3.80 - 5.20 M/uL    Hemoglobin 12.2 11.5 - 16.0 g/dL    Hematocrit 37.8 35.0 - 47.0 %    MCV 89.4 80.0 - 99.0 FL    MCH 28.8 26.0 - 34.0 PG    MCHC 32.3 30.0 - 36.5 g/dL    RDW 13.7 11.5 - 14.5 %    Platelets 681 923 - 660 K/uL    MPV 9.0 8.9 - 12.9 FL    Nucleated RBCs 0.0 0  WBC    nRBC 0.00 0.00 - 0.01 K/uL    Neutrophils % 75 32 - 75 %    Lymphocytes % 17 12 - 49 %    Monocytes % 6 5 - 13 %    Eosinophils % 1 0 - 7 %    Basophils % 0 0 - 1 %    Immature Granulocytes 1 (H) 0.0 - 0.5 %    Neutrophils Absolute 8.8 (H) 1.8 - 8.0 K/UL    Lymphocytes Absolute 2.0 0.8 - 3.5 K/UL    Monocytes Absolute 0.7 0.0 - 1.0 K/UL    Eosinophils Absolute 0.1 0.0 - 0.4 K/UL    Basophils Absolute 0.1 0.0 - 0.1 K/UL    Absolute Immature Granulocyte 0.1 (H) 0.00 - 0.04 K/UL    Differential Type AUTOMATED     Comprehensive Metabolic Panel w/ Reflex to MG    Collection Time: 08/21/23  9:53 PM   Result Value Ref Range    Sodium 135 (L) 136 - 145 mmol/L    Potassium 4.7 3.5 - 5.1 mmol/L    Chloride 100 97 - 108 mmol/L    CO2 30 21 - 32 mmol/L    Anion Gap 5 5 - 15 mmol/L    Glucose 204 (H) 65 - 100 mg/dL    BUN 19 6 - 20 MG/DL    Creatinine 0.67 0.55 - 1.02 MG/DL    Bun/Cre Ratio 28 (H) 12 - 20      Est, Glom Filt Rate >60 >60 ml/min/1.73m2    Calcium 9.0 8.5 - 10.1 MG/DL    Total Bilirubin 0.4 0.2 - 1.0 MG/DL    ALT 20 12 - 78 U/L    AST 15 15 - 37 U/L    Alk Phosphatase 96 45 - 117 U/L    Total Protein 8.0 6.4 - 8.2 g/dL    Albumin 3.9 3.5 - 5.0 g/dL    Globulin 4.1 (H) 2.0 - 4.0 g/dL    Albumin/Globulin Ratio 1.0 (L) 1.1 - 2.2     Protime-INR    Collection Time: 08/21/23  9:53 PM   Result Value Ref Range    INR 1.1 0.9 - 1.1      Protime 11.0 9.0 - 11.1 sec   TYPE AND SCREEN    Collection Time: 08/21/23  9:53 PM   Result Value Ref Range    Crossmatch expiration date 08/24/2023,2359     ABO/Rh O NEGATIVE     Antibody Screen NEG    Urinalysis with Reflex to Culture    Collection Time: 08/21/23  9:53 PM    Specimen: Urine   Result Value Ref Range    Color, UA YELLOW/STRAW      Appearance CLEAR CLEAR      Specific Gravity, UA 1.012      pH, Urine 6.5 5.0 - 8.0      Protein, UA Negative NEG mg/dL    Glucose, UA Negative NEG mg/dL    Ketones, Urine Negative NEG mg/dL    Bilirubin Urine Negative NEG      Blood, Urine Negative NEG      Urobilinogen, Urine 1.0 0.2 - 1.0 EU/dL    Nitrite, Urine Negative NEG      Leukocyte Esterase, Urine Negative NEG      Urine Culture if Indicated CULTURE NOT INDICATED BY UA RESULT      WBC, UA 0-4 0 - 4 /hpf    RBC, UA 0-5 0 - 5 /hpf    Epithelial Cells UA FEW FEW /lpf    BACTERIA, URINE Negative NEG /hpf    Hyaline Casts, UA 0-2 0 - 2 /lpf   Extra Tubes Hold    Collection Time: 08/21/23  9:53 PM   Result Value Ref Range    Specimen HOld SST     Comment:        Add-on orders for these samples will be processed based on acceptable specimen integrity and analyte stability, which may vary by analyte. CT Result (most recent):  No results found for this or any previous visit from the past 3650 days. Xray Result (most recent):  XR CHEST PORTABLE 08/21/2023    Narrative  INDICATION: pre-op    EXAM:  AP CHEST RADIOGRAPH    COMPARISON: 7/13/2023    FINDINGS:    AP portable view of the chest demonstrates a normal cardiomediastinal  silhouette. There is no edema, effusion, consolidation, or pneumothorax. The  osseous structures are unremarkable. Impression  No acute process.

## 2023-08-22 NOTE — PROGRESS NOTES
Patient seen and examined at bedside. No c/o pain. Understands surgery today. Family and patient interested in list of SNFs and plans moving forward after surgery. Can start DM diet 4 carb choices post op. Non billable note.   Otherwise agree with overnight H&P.

## 2023-08-22 NOTE — ANESTHESIA POSTPROCEDURE EVALUATION
Department of Anesthesiology  Postprocedure Note    Patient: Milla Arciniega  MRN: [id number]  YOB: 1935  Date of evaluation: 8/22/2023      Procedure Summary     Date: 08/22/23 Room / Location: [facility] MAIN OR M8 / [facility] MAIN OR    Anesthesia Start: 1637 Anesthesia Stop: 1836    Procedure: HIP HEMIARTHROPLASTY (Right: Hip) Diagnosis:       Closed fracture of right hip, initial encounter ([address])      (Closed fracture of right hip, initial encounter ([address]) Pooja Naik)    Providers: Abby Watson DO Responsible Provider: Michael Osborne MD    Anesthesia Type: General ASA Status: 3          Anesthesia Type: General    Miles Phase I: Miles Score: 8    Miles Phase II:        Anesthesia Post Evaluation    Patient location during evaluation: PACU  Patient participation: complete - patient participated  Level of consciousness: sleepy but conscious and responsive to verbal stimuli  Airway patency: patent  Nausea & Vomiting: no vomiting and no nausea  Complications: no  Cardiovascular status: blood pressure returned to baseline and hemodynamically stable  Respiratory status: acceptable  Hydration status: stable

## 2023-08-22 NOTE — PERIOP NOTE
TRANSFER - IN REPORT:    Verbal report received from [facility] on Gia Dolazam  being received from ortho Room 112 for ordered procedure      Report consisted of patient's Situation, Background, Assessment and   Recommendations(SBAR). Information from the following report(s) Nurse Handoff Report, Intake/Output, MAR, Recent Results, Med Rec Status, Cardiac Rhythm NSR/Afib, and Procedure Verification was reviewed with the receiving nurse. Opportunity for questions and clarification was provided. Assessment completed upon patient's arrival to unit and care assumed.

## 2023-08-22 NOTE — PROGRESS NOTES
Brief orthopedic note:    81 yr old after GlF with R femoral neck fracture.      Plan:  -Npo at midnight  - OR tomorrow with Dr Radha Naqvi for Right hemiarthroplasty  - bed rest  - medical admission and preop clearance   - full consult to follow in am    Dr Radha aNqvi aware and agrees  Jorje Daniels PA-C

## 2023-08-23 LAB
ABO + RH BLD: NORMAL
ANION GAP SERPL CALC-SCNC: 5 MMOL/L (ref 5–15)
APPEARANCE UR: CLEAR
BACTERIA URNS QL MICRO: NEGATIVE /HPF
BASOPHILS # BLD: 0 K/UL (ref 0–0.1)
BASOPHILS NFR BLD: 0 % (ref 0–1)
BILIRUB UR QL: NEGATIVE
BLOOD GROUP ANTIBODIES SERPL: NORMAL
BUN SERPL-MCNC: 11 MG/DL (ref 6–20)
BUN/CREAT SERPL: 15 (ref 12–20)
CALCIUM SERPL-MCNC: 8.9 MG/DL (ref 8.5–10.1)
CHLORIDE SERPL-SCNC: 97 MMOL/L (ref 97–108)
CO2 SERPL-SCNC: 29 MMOL/L (ref 21–32)
COLOR UR: ABNORMAL
CREAT SERPL-MCNC: 0.73 MG/DL (ref 0.55–1.02)
DIFFERENTIAL METHOD BLD: ABNORMAL
EOSINOPHIL # BLD: 0 K/UL (ref 0–0.4)
EOSINOPHIL NFR BLD: 0 % (ref 0–7)
EPITH CASTS URNS QL MICRO: ABNORMAL /LPF
ERYTHROCYTE [DISTWIDTH] IN BLOOD BY AUTOMATED COUNT: 13.5 % (ref 11.5–14.5)
GLUCOSE BLD STRIP.AUTO-MCNC: 218 MG/DL (ref 65–117)
GLUCOSE BLD STRIP.AUTO-MCNC: 263 MG/DL (ref 65–117)
GLUCOSE BLD STRIP.AUTO-MCNC: 291 MG/DL (ref 65–117)
GLUCOSE BLD STRIP.AUTO-MCNC: 292 MG/DL (ref 65–117)
GLUCOSE SERPL-MCNC: 291 MG/DL (ref 65–100)
GLUCOSE UR STRIP.AUTO-MCNC: 500 MG/DL
HCT VFR BLD AUTO: 34 % (ref 35–47)
HGB BLD-MCNC: 11.2 G/DL (ref 11.5–16)
HGB UR QL STRIP: NEGATIVE
HYALINE CASTS URNS QL MICRO: ABNORMAL /LPF (ref 0–2)
IMM GRANULOCYTES # BLD AUTO: 0.1 K/UL (ref 0–0.04)
IMM GRANULOCYTES NFR BLD AUTO: 1 % (ref 0–0.5)
KETONES UR QL STRIP.AUTO: ABNORMAL MG/DL
LEUKOCYTE ESTERASE UR QL STRIP.AUTO: NEGATIVE
LYMPHOCYTES # BLD: 1.2 K/UL (ref 0.8–3.5)
LYMPHOCYTES NFR BLD: 10 % (ref 12–49)
MCH RBC QN AUTO: 29 PG (ref 26–34)
MCHC RBC AUTO-ENTMCNC: 32.9 G/DL (ref 30–36.5)
MCV RBC AUTO: 88.1 FL (ref 80–99)
MONOCYTES # BLD: 1.2 K/UL (ref 0–1)
MONOCYTES NFR BLD: 9 % (ref 5–13)
NEUTS SEG # BLD: 10.2 K/UL (ref 1.8–8)
NEUTS SEG NFR BLD: 80 % (ref 32–75)
NITRITE UR QL STRIP.AUTO: NEGATIVE
NRBC # BLD: 0 K/UL (ref 0–0.01)
NRBC BLD-RTO: 0 PER 100 WBC
PH UR STRIP: 6 (ref 5–8)
PLATELET # BLD AUTO: 295 K/UL (ref 150–400)
PMV BLD AUTO: 9.8 FL (ref 8.9–12.9)
POTASSIUM SERPL-SCNC: 4.7 MMOL/L (ref 3.5–5.1)
PROT UR STRIP-MCNC: NEGATIVE MG/DL
RBC # BLD AUTO: 3.86 M/UL (ref 3.8–5.2)
RBC #/AREA URNS HPF: ABNORMAL /HPF (ref 0–5)
SERVICE CMNT-IMP: ABNORMAL
SODIUM SERPL-SCNC: 131 MMOL/L (ref 136–145)
SP GR UR REFRACTOMETRY: 1.01
SPECIMEN EXP DATE BLD: NORMAL
URINE CULTURE IF INDICATED: ABNORMAL
UROBILINOGEN UR QL STRIP.AUTO: 0.2 EU/DL (ref 0.2–1)
WBC # BLD AUTO: 12.7 K/UL (ref 3.6–11)
WBC URNS QL MICRO: ABNORMAL /HPF (ref 0–4)

## 2023-08-23 PROCEDURE — 97535 SELF CARE MNGMENT TRAINING: CPT

## 2023-08-23 PROCEDURE — 2580000003 HC RX 258: Performed by: ORTHOPAEDIC SURGERY

## 2023-08-23 PROCEDURE — 51701 INSERT BLADDER CATHETER: CPT

## 2023-08-23 PROCEDURE — 2700000000 HC OXYGEN THERAPY PER DAY

## 2023-08-23 PROCEDURE — 6370000000 HC RX 637 (ALT 250 FOR IP): Performed by: STUDENT IN AN ORGANIZED HEALTH CARE EDUCATION/TRAINING PROGRAM

## 2023-08-23 PROCEDURE — 51798 US URINE CAPACITY MEASURE: CPT

## 2023-08-23 PROCEDURE — 81001 URINALYSIS AUTO W/SCOPE: CPT

## 2023-08-23 PROCEDURE — 36415 COLL VENOUS BLD VENIPUNCTURE: CPT

## 2023-08-23 PROCEDURE — 85025 COMPLETE CBC W/AUTO DIFF WBC: CPT

## 2023-08-23 PROCEDURE — 97530 THERAPEUTIC ACTIVITIES: CPT

## 2023-08-23 PROCEDURE — 6370000000 HC RX 637 (ALT 250 FOR IP): Performed by: ORTHOPAEDIC SURGERY

## 2023-08-23 PROCEDURE — 97162 PT EVAL MOD COMPLEX 30 MIN: CPT

## 2023-08-23 PROCEDURE — 94760 N-INVAS EAR/PLS OXIMETRY 1: CPT

## 2023-08-23 PROCEDURE — 82962 GLUCOSE BLOOD TEST: CPT

## 2023-08-23 PROCEDURE — 97165 OT EVAL LOW COMPLEX 30 MIN: CPT

## 2023-08-23 PROCEDURE — 1100000003 HC PRIVATE W/ TELEMETRY

## 2023-08-23 PROCEDURE — 80048 BASIC METABOLIC PNL TOTAL CA: CPT

## 2023-08-23 PROCEDURE — 6360000002 HC RX W HCPCS: Performed by: ORTHOPAEDIC SURGERY

## 2023-08-23 RX ORDER — INSULIN GLARGINE 100 [IU]/ML
10 INJECTION, SOLUTION SUBCUTANEOUS NIGHTLY
Status: DISCONTINUED | OUTPATIENT
Start: 2023-08-23 | End: 2023-08-23

## 2023-08-23 RX ORDER — FAMOTIDINE 20 MG/1
20 TABLET, FILM COATED ORAL DAILY
Status: DISCONTINUED | OUTPATIENT
Start: 2023-08-24 | End: 2023-08-26 | Stop reason: HOSPADM

## 2023-08-23 RX ORDER — INSULIN GLARGINE 100 [IU]/ML
6 INJECTION, SOLUTION SUBCUTANEOUS NIGHTLY
Status: DISCONTINUED | OUTPATIENT
Start: 2023-08-23 | End: 2023-08-24

## 2023-08-23 RX ADMIN — SENNOSIDES AND DOCUSATE SODIUM 1 TABLET: 50; 8.6 TABLET ORAL at 08:38

## 2023-08-23 RX ADMIN — Medication: at 09:01

## 2023-08-23 RX ADMIN — SODIUM CHLORIDE, PRESERVATIVE FREE 10 ML: 5 INJECTION INTRAVENOUS at 08:40

## 2023-08-23 RX ADMIN — ACETAMINOPHEN 650 MG: 325 TABLET ORAL at 21:58

## 2023-08-23 RX ADMIN — INSULIN GLARGINE 6 UNITS: 100 INJECTION, SOLUTION SUBCUTANEOUS at 21:57

## 2023-08-23 RX ADMIN — Medication 2 UNITS: at 13:04

## 2023-08-23 RX ADMIN — SODIUM CHLORIDE, PRESERVATIVE FREE 10 ML: 5 INJECTION INTRAVENOUS at 21:58

## 2023-08-23 RX ADMIN — POLYETHYLENE GLYCOL 3350 17 G: 17 POWDER, FOR SOLUTION ORAL at 08:37

## 2023-08-23 RX ADMIN — Medication 4 UNITS: at 18:32

## 2023-08-23 RX ADMIN — Medication 4 UNITS: at 08:39

## 2023-08-23 RX ADMIN — SENNOSIDES AND DOCUSATE SODIUM 1 TABLET: 50; 8.6 TABLET ORAL at 21:58

## 2023-08-23 RX ADMIN — ACETAMINOPHEN 650 MG: 325 TABLET ORAL at 08:38

## 2023-08-23 RX ADMIN — WATER 2000 MG: 1 INJECTION INTRAMUSCULAR; INTRAVENOUS; SUBCUTANEOUS at 00:54

## 2023-08-23 RX ADMIN — ACETAMINOPHEN 650 MG: 325 TABLET ORAL at 15:14

## 2023-08-23 RX ADMIN — Medication: at 22:16

## 2023-08-23 RX ADMIN — WATER 2000 MG: 1 INJECTION INTRAMUSCULAR; INTRAVENOUS; SUBCUTANEOUS at 08:36

## 2023-08-23 RX ADMIN — FAMOTIDINE 20 MG: 20 TABLET, FILM COATED ORAL at 08:39

## 2023-08-23 RX ADMIN — DIGOXIN 0.12 MG: 125 TABLET ORAL at 08:38

## 2023-08-23 ASSESSMENT — PAIN DESCRIPTION - LOCATION: LOCATION: HIP

## 2023-08-23 ASSESSMENT — PAIN DESCRIPTION - DESCRIPTORS: DESCRIPTORS: ACHING

## 2023-08-23 ASSESSMENT — PAIN - FUNCTIONAL ASSESSMENT: PAIN_FUNCTIONAL_ASSESSMENT: PREVENTS OR INTERFERES SOME ACTIVE ACTIVITIES AND ADLS

## 2023-08-23 ASSESSMENT — PAIN DESCRIPTION - ORIENTATION: ORIENTATION: RIGHT

## 2023-08-23 ASSESSMENT — PAIN SCALES - GENERAL
PAINLEVEL_OUTOF10: 3
PAINLEVEL_OUTOF10: 0

## 2023-08-23 NOTE — PLAN OF CARE
Problem: Pain  Goal: Verbalizes/displays adequate comfort level or baseline comfort level  Outcome: Progressing     Problem: Skin/Tissue Integrity  Goal: Absence of new skin breakdown  Description: 1. Monitor for areas of redness and/or skin breakdown  2. Assess vascular access sites hourly  3. Every 4-6 hours minimum:  Change oxygen saturation probe site  4. Every 4-6 hours:  If on nasal continuous positive airway pressure, respiratory therapy assess nares and determine need for appliance change or resting period.   Outcome: Progressing     Problem: Safety - Adult  Goal: Free from fall injury  Outcome: Progressing     Problem: ABCDS Injury Assessment  Goal: Absence of physical injury  Outcome: Progressing     Problem: Neurosensory - Adult  Goal: Achieves stable or improved neurological status  Outcome: Progressing  Goal: Achieves maximal functionality and self care  Outcome: Progressing     Problem: Skin/Tissue Integrity - Adult  Goal: Skin integrity remains intact  Outcome: Progressing  Goal: Incisions, wounds, or drain sites healing without S/S of infection  Outcome: Progressing  Goal: Oral mucous membranes remain intact  Outcome: Progressing     Problem: Musculoskeletal - Adult  Goal: Return mobility to safest level of function  Outcome: Progressing  Goal: Maintain proper alignment of affected body part  Outcome: Progressing  Goal: Return ADL status to a safe level of function  Outcome: Progressing     Problem: Genitourinary - Adult  Goal: Urinary catheter remains patent  Outcome: Progressing

## 2023-08-23 NOTE — PROGRESS NOTES
1515 - patient voided 125 mL of cloudy malodorous urine per bedside commode. Post void scan shows 550mL. Temp 99.7. Patient increasingly confused but still answering orientation questions appropriately. Messaged Dr. Jimmy Fairchild. 1536 - Cath patient and send urinalysis with reflex to culture per Dr. Jimmy Fairchild. Urinalysis negative. 1620 - Patient straight cathed for 500 mL. Urinalysis with reflex culture sent. 1930 - Bedside shift change report given to 1701 Gustavo Joiner RN (oncoming nurse) by Garland Salazar RN (offgoing nurse). Report included the following information Nurse Handoff Report, Index, Adult Overview, Surgery Report, Intake/Output, MAR, Recent Results, and Med Rec Status.

## 2023-08-23 NOTE — PROGRESS NOTES
End of Shift Note    Bedside shift change report given to RICHARD Oglesby (oncoming nurse) by Nadege Kaur RN (offgoing nurse). Report included the following information SBAR, Kardex, Intake/Output, MAR, and Recent Results    Shift worked:  7P-7A     Shift summary and any significant changes:     Pt is alert and orientation. no any pain complained during my shift. Brown out at 6:25 am.     Concerns for physician to address:       Zone phone for oncoming shift:   [phone]       Activity:     Number times ambulated in hallways past shift: 0  Number of times OOB to chair past shift: 0    Cardiac:   Cardiac Monitoring: Yes           Access:  Current line(s): PIV     Genitourinary:   Urinary status: due to void    Respiratory:      Chronic home O2 use?: NO  Incentive spirometer at bedside: YES       GI:     Current diet:  ADULT ORAL NUTRITION SUPPLEMENT; Breakfast, Lunch, Dinner; Standard High Calorie/High Protein Oral Supplement  ADULT DIET; Regular  Passing flatus: YES  Tolerating current diet: YES       Pain Management:   Patient states pain is manageable on current regimen: YES    Skin:     Interventions: float heels, increase time out of bed, and PT/OT consult    Patient Safety:  Fall Score:    Interventions: bed/chair alarm, assistive device (walker, cane.  etc), gripper socks, pt to call before getting OOB, and stay with me (per policy)       Length of Stay:  Expected LOS: 2  Actual LOS: 1      Gregory Hubbard RN

## 2023-08-23 NOTE — PLAN OF CARE
Problem: Pain  Goal: Verbalizes/displays adequate comfort level or baseline comfort level  8/23/2023 1124 by Tami Ramirez RN  Outcome: Progressing  8/23/2023 0244 by Starla Wolf RN  Outcome: Progressing     Problem: Skin/Tissue Integrity  Goal: Absence of new skin breakdown  Description: 1. Monitor for areas of redness and/or skin breakdown  2. Assess vascular access sites hourly  3. Every 4-6 hours minimum:  Change oxygen saturation probe site  4. Every 4-6 hours:  If on nasal continuous positive airway pressure, respiratory therapy assess nares and determine need for appliance change or resting period.   8/23/2023 1124 by Tami Ramirez RN  Outcome: Progressing  8/23/2023 0244 by Starla Wolf RN  Outcome: Progressing     Problem: Safety - Adult  Goal: Free from fall injury  8/23/2023 1124 by Tami Ramirez RN  Outcome: Progressing  8/23/2023 0244 by Starla Wolf RN  Outcome: Progressing

## 2023-08-23 NOTE — CARE COORDINATION
Transition of Care Plan:    RUR: 13%  Prior Level of Functioning: Independent  Disposition: IPR  If SNF or IPR: Date FOC offered: Pending   Date FOC received: Pending   Accepting facility: Pending   Date authorization started with reference number: N/A  Date authorization received and expires: N/A  Follow up appointments: TBD  DME needed: TBD  Transportation at discharge: TBD  IM/IMM Medicare/ letter given: 2nd IMM needs to be given   Is patient a [place] and connected with VA? N/A   If yes, was [employer] transfer form completed and VA notified? N/A  Caregiver Contact: Zoe Irizarry  Discharge Caregiver contacted prior to discharge? Pt and spouse will be contacted at d/c  Care Conference needed? N/A  Barriers to discharge:  Placement    CM spoke to pt regarding the recommendation is for inpatient rehab. CM provided the pt with a list of inpatient rehab. CM will follow up with pt regarding choice tomorrow.      Georgina Dobson

## 2023-08-23 NOTE — PLAN OF CARE
Problem: Occupational Therapy - Adult  Goal: By Discharge: Performs self-care activities at highest level of function for planned discharge setting. See evaluation for individualized goals. Description: FUNCTIONAL STATUS PRIOR TO ADMISSION:  Pt reports being independent with ADLs and functional mobility. ADL Assistance: Independent,  ,  ,  ,  ,  , Homemaking Assistance: Independent, Ambulation Assistance: Independent, Transfer Assistance: Independent, Active : Yes     HOME SUPPORT:   Occupational Therapy Goals:  Initiated 8/23/2023    1. Patient will perform lower body dressing with AE PRN with Minimal Assist within 7 day(s). 2. Patient will perform grooming ADLs with supervision in standing within 7 days. 3. Patient will perform toilet transfers with Minimal Assist within 7 days. 4. Patient will perform all aspects of toileting at Minimal Assist within 7 days. 5. Patient will utilize energy conservation techniques during functional activities without cues within 7 day(s). 6. Patient will adhere to posterior hip precautions without cues within 7 days. Outcome: Progressing  OCCUPATIONAL THERAPY EVALUATION    Patient: Lei Vazquez (88 y.o. female)  Date: 8/23/2023  Primary Diagnosis: Closed right hip fracture, initial encounter ([address] [facility]) [S72.001A]  Procedure(s) (LRB):  HIP HEMIARTHROPLASTY (Right) 1 Day Post-Op     Precautions: Weight Bearing Right Lower Extremity Weight Bearing: Weight Bearing As Tolerated           Hip Precautions: Posterior hip precautions    ASSESSMENT :  The patient is limited from independent baseline by decreased functional mobility, independence in ADLs, ROM, strength, activity tolerance, safety awareness, balance, following admission for R hip fracture, POD 1 R hip hemiarthroplasty. She was received supine in bed, agreeable to participate. Reviewed hip precautions and pt verbalized understanding.  She transferred supine>sit with mod A x2 and increased time, demo'd Mobility and Transfers for ADLs:    Bed Mobility:     Bed Mobility Training  Bed Mobility Training: Yes  Supine to Sit: Moderate assistance;Assist X2;Additional time  Scooting: Moderate assistance;Assist X2    Transfers:     Transfer Training  Transfer Training: Yes  Interventions: Verbal cues;Manual cues; Weight shifting training/pressure relief  Sit to Stand: Minimum assistance;Assist X2;Additional time  Stand to Sit: Minimum assistance;Assist X2  Bed to Chair: Minimum assistance;Assist X2;Additional time            Balance:  Standing: Impaired  Balance  Sitting: Impaired  Sitting - Static: Good (unsupported)  Sitting - Dynamic: Fair (occasional)  Standing: Impaired  Standing - Static: Fair;Constant support  Standing - Dynamic: Fair;Constant support        ADL Assessment:     Feeding: Setup       Grooming: Supervision;Setup  Grooming Skilled Clinical Factors: supported sit    UE Bathing: Minimal assistance       LE Bathing: Maximum assistance       UE Dressing: Minimal assistance       LE Dressing: Dependent/Total       Toileting: Dependent/Total     ADL Intervention and task modifications:                                                                                                                                                                                                                                     Barthel Index:    Barthel Index Scale  Feeding: Independent, Able to apply any necessary device. Feeds in reasonable time  Bathing: Cannot perform activity  Grooming: Washes face, light hair, brushes teeth, shaves (manages plug if electric razor)  Dressing: Cannot perform activity  Bowel Control: No accidents.  Able to use enema or suppository if needed  Bladder Control: Occasional accidents or needs help with device  Toilet Transfers: Cannot perform activity  Chair/Bed Trannsfers: Able to sit, but needs maximum assistance to transfer  Ambulation: Cannot perform activity  Stairs: Cannot perform normal...

## 2023-08-23 NOTE — PROGRESS NOTES
1139 - Tele removed per order. Pain meds offered prior to therapy and education provided. Patient declined. 1334 - patient bladder scanned for 330 mL. Patient would like to try to void within the next hour. Will continue to monitor.

## 2023-08-23 NOTE — PROGRESS NOTES
Physician Progress Note      PATIENT:               Larry Juarez  CSN #:                  [id number]  :                       1935  ADMIT DATE:       2023 8:10 PM  [address] [facility] DATE:  RESPONDING  PROVIDER #:        Ronaldo Mason MD          QUERY TEXT:    Pt admitted with mildly displaced right femoral neck fracture. Pt noted to   have Osteoporosis in H&P. If possible, please document in progress notes and   discharge summary if you are evaluating and/or treating any of the following: The medical record reflects the following:  Risk Factors: Mechanical fall, 81 yr old female,  Clinical Indicators: H&P note noted as Mechanical fall resulting in mildly   displaced right femoral neck fracture, Osteoporosis. XR of hip noted as   Osteopenia. Treatment: RIGHT HIP HEMIARTHROPLASTY, XR hip,  Options provided:  -- Osteoporotic mildly displaced right femoral neck Fracture  -- Osteoporotic mildly displaced right femoral neck fracture following fall   which would not usually break a normal, healthy bone  -- Traumatic mildly displaced right femoral neck fracture  -- Other - I will add my own diagnosis  -- Disagree - Not applicable / Not valid  -- Disagree - Clinically unable to determine / Unknown  -- Refer to Clinical Documentation Reviewer    PROVIDER RESPONSE TEXT:    This patient has an osteoporotic mildly displaced right femoral neck fracture   following fall which would not break a normal, healthy bone. Query created by:  Cata Salas on 2023 2:08 PM      Electronically signed by:  Ronaldo Mason MD 2023 3:50 PM

## 2023-08-23 NOTE — PROGRESS NOTES
Spiritual Care Partner Volunteer visited patient at [facility] in [facility] on 8/23/2023   Documented by:    GENA Cutler  [address] [facility] Cindi TURNER   Paging Service [address] [place] [address] ([phone])

## 2023-08-24 LAB
GLUCOSE BLD STRIP.AUTO-MCNC: 219 MG/DL (ref 65–117)
GLUCOSE BLD STRIP.AUTO-MCNC: 279 MG/DL (ref 65–117)
GLUCOSE BLD STRIP.AUTO-MCNC: 282 MG/DL (ref 65–117)
GLUCOSE BLD STRIP.AUTO-MCNC: 326 MG/DL (ref 65–117)
SERVICE CMNT-IMP: ABNORMAL

## 2023-08-24 PROCEDURE — 6370000000 HC RX 637 (ALT 250 FOR IP): Performed by: ORTHOPAEDIC SURGERY

## 2023-08-24 PROCEDURE — 97116 GAIT TRAINING THERAPY: CPT

## 2023-08-24 PROCEDURE — 51701 INSERT BLADDER CATHETER: CPT

## 2023-08-24 PROCEDURE — 6370000000 HC RX 637 (ALT 250 FOR IP): Performed by: STUDENT IN AN ORGANIZED HEALTH CARE EDUCATION/TRAINING PROGRAM

## 2023-08-24 PROCEDURE — 6360000002 HC RX W HCPCS: Performed by: ORTHOPAEDIC SURGERY

## 2023-08-24 PROCEDURE — 1100000003 HC PRIVATE W/ TELEMETRY

## 2023-08-24 PROCEDURE — 2580000003 HC RX 258: Performed by: ORTHOPAEDIC SURGERY

## 2023-08-24 PROCEDURE — 82962 GLUCOSE BLOOD TEST: CPT

## 2023-08-24 PROCEDURE — 97530 THERAPEUTIC ACTIVITIES: CPT

## 2023-08-24 PROCEDURE — 94760 N-INVAS EAR/PLS OXIMETRY 1: CPT

## 2023-08-24 PROCEDURE — 51798 US URINE CAPACITY MEASURE: CPT

## 2023-08-24 PROCEDURE — 97535 SELF CARE MNGMENT TRAINING: CPT

## 2023-08-24 RX ORDER — INSULIN LISPRO 100 [IU]/ML
0-4 INJECTION, SOLUTION INTRAVENOUS; SUBCUTANEOUS NIGHTLY
Status: DISCONTINUED | OUTPATIENT
Start: 2023-08-24 | End: 2023-08-26 | Stop reason: HOSPADM

## 2023-08-24 RX ORDER — INSULIN GLARGINE 100 [IU]/ML
10 INJECTION, SOLUTION SUBCUTANEOUS NIGHTLY
Status: DISCONTINUED | OUTPATIENT
Start: 2023-08-24 | End: 2023-08-26 | Stop reason: HOSPADM

## 2023-08-24 RX ORDER — INSULIN LISPRO 100 [IU]/ML
0-16 INJECTION, SOLUTION INTRAVENOUS; SUBCUTANEOUS
Status: DISCONTINUED | OUTPATIENT
Start: 2023-08-24 | End: 2023-08-26 | Stop reason: HOSPADM

## 2023-08-24 RX ADMIN — Medication: at 21:10

## 2023-08-24 RX ADMIN — SENNOSIDES AND DOCUSATE SODIUM 1 TABLET: 50; 8.6 TABLET ORAL at 09:13

## 2023-08-24 RX ADMIN — ENOXAPARIN SODIUM 40 MG: 100 INJECTION SUBCUTANEOUS at 09:13

## 2023-08-24 RX ADMIN — BISACODYL 5 MG: 5 TABLET, COATED ORAL at 09:13

## 2023-08-24 RX ADMIN — FAMOTIDINE 20 MG: 20 TABLET, FILM COATED ORAL at 09:13

## 2023-08-24 RX ADMIN — SODIUM CHLORIDE, PRESERVATIVE FREE 10 ML: 5 INJECTION INTRAVENOUS at 21:09

## 2023-08-24 RX ADMIN — ACETAMINOPHEN 650 MG: 325 TABLET ORAL at 14:39

## 2023-08-24 RX ADMIN — ACETAMINOPHEN 650 MG: 325 TABLET ORAL at 21:08

## 2023-08-24 RX ADMIN — POLYETHYLENE GLYCOL 3350 17 G: 17 POWDER, FOR SOLUTION ORAL at 09:13

## 2023-08-24 RX ADMIN — ACETAMINOPHEN 650 MG: 325 TABLET ORAL at 03:00

## 2023-08-24 RX ADMIN — INSULIN GLARGINE 10 UNITS: 100 INJECTION, SOLUTION SUBCUTANEOUS at 21:09

## 2023-08-24 RX ADMIN — Medication 12 UNITS: at 11:53

## 2023-08-24 RX ADMIN — DIGOXIN 0.12 MG: 125 TABLET ORAL at 09:13

## 2023-08-24 RX ADMIN — Medication: at 09:15

## 2023-08-24 RX ADMIN — ACETAMINOPHEN 650 MG: 325 TABLET ORAL at 09:13

## 2023-08-24 RX ADMIN — Medication 4 UNITS: at 16:35

## 2023-08-24 RX ADMIN — SODIUM CHLORIDE, PRESERVATIVE FREE 10 ML: 5 INJECTION INTRAVENOUS at 09:18

## 2023-08-24 RX ADMIN — SENNOSIDES AND DOCUSATE SODIUM 1 TABLET: 50; 8.6 TABLET ORAL at 21:09

## 2023-08-24 ASSESSMENT — PAIN SCALES - GENERAL
PAINLEVEL_OUTOF10: 0
PAINLEVEL_OUTOF10: 2
PAINLEVEL_OUTOF10: 0

## 2023-08-24 NOTE — PROGRESS NOTES
End of Shift Note    Bedside shift change report given to  (oncoming nurse) by Magi Camacho RN (offgoing nurse). Report included the following information SBAR, Kardex, MAR, and Recent Results    Shift worked:  days     Shift summary and any significant changes: Mclean was placed due to retention     Concerns for physician to address:       Zone phone for oncoming shift:          Activity:     Number times ambulated in hallways past shift: 0  Number of times OOB to chair past shift: 1    Cardiac:   Cardiac Monitoring: No           Access:  Current line(s): PIV     Genitourinary:   Urinary status: mclean    Respiratory:      Chronic home O2 use?: NO  Incentive spirometer at bedside: N/A       GI:     Current diet:  ADULT ORAL NUTRITION SUPPLEMENT; Breakfast, Lunch, Dinner; Diabetic Oral Supplement  ADULT DIET;  Regular; 3 carb choices (45 gm/meal)  Passing flatus: YES  Tolerating current diet: YES       Pain Management:   Patient states pain is manageable on current regimen: YES    Skin:     Interventions: increase time out of bed    Patient Safety:  Fall Score:    Interventions: bed/chair alarm and pt to call before getting OOB       Length of Stay:  Expected LOS: 3  Actual LOS: 2      Magi Camacho RN

## 2023-08-24 NOTE — PLAN OF CARE
Problem: Physical Therapy - Adult  Goal: By Discharge: Performs mobility at highest level of function for planned discharge setting. See evaluation for individualized goals. Description: FUNCTIONAL STATUS PRIOR TO ADMISSION: Patient was independent without use of DME.    HOME SUPPORT PRIOR TO ADMISSION: The patient lived with spouse who assists as needed. Patient reports they live in a \"mother-in-law\" cabin attached to her sons house. Physical Therapy Goals  Initiated 8/23/2023  1. Patient will move from supine to sit and sit to supine, scoot up and down, and roll side to side in bed with minimal assistance within 7 day(s). 2.  Patient will perform sit to stand with contact guard assist within 7 day(s). 3.  Patient will transfer from bed to chair and chair to bed with contact guard assist using the least restrictive device within 7 day(s). 4.  Patient will ambulate with contact guard assist for 50 feet with the least restrictive device within 7 day(s). Outcome: Progressing   PHYSICAL THERAPY TREATMENT    Patient: Lenora Smith (65 y.o. female)  Date: 8/24/2023  Diagnosis: Closed right hip fracture, initial encounter ([address]) [S72.001A] Closed right hip fracture, initial encounter ([address])  Procedure(s) (LRB):  HIP HEMIARTHROPLASTY (Right) 2 Days Post-Op  Precautions: Weight Bearing Right Lower Extremity Weight Bearing: Weight Bearing As Tolerated           Hip Precautions: Posterior hip precautions    ASSESSMENT:  Patient continues to benefit from skilled PT services and is progressing towards goals. Pt was received sleeping upon arrival, and initially A&O x0 with MD present in the room, however her mentation does improve and was oriented x3 when questioned again throughout therapy services. Pt is pleasantly agreeable for mobility and transitions to come sitting EOB, needing cues for use of bed railings as support to come sitting EOB and min-modA x2.  Pt stands with minAx2 and reports no dizziness

## 2023-08-24 NOTE — PLAN OF CARE
Problem: Occupational Therapy - Adult  Goal: By Discharge: Performs self-care activities at highest level of function for planned discharge setting. See evaluation for individualized goals. Description: FUNCTIONAL STATUS PRIOR TO ADMISSION:  Pt reports being independent with ADLs and functional mobility. ADL Assistance: Independent,  ,  ,  ,  ,  , Homemaking Assistance: Independent, Ambulation Assistance: Independent, Transfer Assistance: Independent, Active : Yes     HOME SUPPORT:   Occupational Therapy Goals:  Initiated 8/23/2023    1. Patient will perform lower body dressing with AE PRN with Minimal Assist within 7 day(s). 2. Patient will perform grooming ADLs with supervision in standing within 7 days. 3. Patient will perform toilet transfers with Minimal Assist within 7 days. 4. Patient will perform all aspects of toileting at Minimal Assist within 7 days. 5. Patient will utilize energy conservation techniques during functional activities without cues within 7 day(s). 6. Patient will adhere to posterior hip precautions without cues within 7 days. Outcome: Progressing   OCCUPATIONAL THERAPY TREATMENT  Patient: Zeus Monge (50 y.o. female)  Date: 8/24/2023  Primary Diagnosis: Closed right hip fracture, initial encounter ([address] [facility]) [S72.001A]  Procedure(s) (LRB):  HIP HEMIARTHROPLASTY (Right) 2 Days Post-Op   Precautions: Weight Bearing Right Lower Extremity Weight Bearing: Weight Bearing As Tolerated           Hip Precautions: Posterior hip precautions  Chart, occupational therapy assessment, plan of care, and goals were reviewed. ASSESSMENT  Patient continues to benefit from skilled OT services and is slowly progressing towards goals. Pt received in bed, initially confused, however, once pt transferred to EOB, pt alert, oriented x3 and following all commands with min intermittent cues. Pt educated on posterior hip precautions, demonstrates good immediate recall, 2/3 delayed recall.  Pt

## 2023-08-25 LAB
ANION GAP SERPL CALC-SCNC: 4 MMOL/L (ref 5–15)
BUN SERPL-MCNC: 9 MG/DL (ref 6–20)
BUN/CREAT SERPL: 16 (ref 12–20)
CALCIUM SERPL-MCNC: 8.7 MG/DL (ref 8.5–10.1)
CHLORIDE SERPL-SCNC: 96 MMOL/L (ref 97–108)
CO2 SERPL-SCNC: 32 MMOL/L (ref 21–32)
CREAT SERPL-MCNC: 0.56 MG/DL (ref 0.55–1.02)
GLUCOSE BLD STRIP.AUTO-MCNC: 148 MG/DL (ref 65–117)
GLUCOSE BLD STRIP.AUTO-MCNC: 247 MG/DL (ref 65–117)
GLUCOSE BLD STRIP.AUTO-MCNC: 260 MG/DL (ref 65–117)
GLUCOSE BLD STRIP.AUTO-MCNC: 300 MG/DL (ref 65–117)
GLUCOSE SERPL-MCNC: 251 MG/DL (ref 65–100)
HCT VFR BLD AUTO: 28.6 % (ref 35–47)
HGB BLD-MCNC: 9.4 G/DL (ref 11.5–16)
POTASSIUM SERPL-SCNC: 4 MMOL/L (ref 3.5–5.1)
SERVICE CMNT-IMP: ABNORMAL
SODIUM SERPL-SCNC: 132 MMOL/L (ref 136–145)

## 2023-08-25 PROCEDURE — 80048 BASIC METABOLIC PNL TOTAL CA: CPT

## 2023-08-25 PROCEDURE — 6370000000 HC RX 637 (ALT 250 FOR IP): Performed by: ORTHOPAEDIC SURGERY

## 2023-08-25 PROCEDURE — 97116 GAIT TRAINING THERAPY: CPT

## 2023-08-25 PROCEDURE — 97530 THERAPEUTIC ACTIVITIES: CPT

## 2023-08-25 PROCEDURE — 6370000000 HC RX 637 (ALT 250 FOR IP): Performed by: STUDENT IN AN ORGANIZED HEALTH CARE EDUCATION/TRAINING PROGRAM

## 2023-08-25 PROCEDURE — 2580000003 HC RX 258: Performed by: ORTHOPAEDIC SURGERY

## 2023-08-25 PROCEDURE — 85018 HEMOGLOBIN: CPT

## 2023-08-25 PROCEDURE — 36415 COLL VENOUS BLD VENIPUNCTURE: CPT

## 2023-08-25 PROCEDURE — 6360000002 HC RX W HCPCS: Performed by: ORTHOPAEDIC SURGERY

## 2023-08-25 PROCEDURE — 82962 GLUCOSE BLOOD TEST: CPT

## 2023-08-25 PROCEDURE — 94760 N-INVAS EAR/PLS OXIMETRY 1: CPT

## 2023-08-25 PROCEDURE — 85014 HEMATOCRIT: CPT

## 2023-08-25 PROCEDURE — 1100000003 HC PRIVATE W/ TELEMETRY

## 2023-08-25 PROCEDURE — 97535 SELF CARE MNGMENT TRAINING: CPT

## 2023-08-25 RX ADMIN — Medication 12 UNITS: at 12:05

## 2023-08-25 RX ADMIN — ENOXAPARIN SODIUM 40 MG: 100 INJECTION SUBCUTANEOUS at 08:21

## 2023-08-25 RX ADMIN — SENNOSIDES AND DOCUSATE SODIUM 1 TABLET: 50; 8.6 TABLET ORAL at 21:16

## 2023-08-25 RX ADMIN — ACETAMINOPHEN 650 MG: 325 TABLET ORAL at 08:21

## 2023-08-25 RX ADMIN — Medication 4 UNITS: at 08:21

## 2023-08-25 RX ADMIN — DIGOXIN 0.12 MG: 125 TABLET ORAL at 08:21

## 2023-08-25 RX ADMIN — Medication: at 08:22

## 2023-08-25 RX ADMIN — ACETAMINOPHEN 650 MG: 325 TABLET ORAL at 14:11

## 2023-08-25 RX ADMIN — POLYETHYLENE GLYCOL 3350 17 G: 17 POWDER, FOR SOLUTION ORAL at 08:21

## 2023-08-25 RX ADMIN — SENNOSIDES AND DOCUSATE SODIUM 1 TABLET: 50; 8.6 TABLET ORAL at 08:21

## 2023-08-25 RX ADMIN — BISACODYL 5 MG: 5 TABLET, COATED ORAL at 08:21

## 2023-08-25 RX ADMIN — INSULIN GLARGINE 10 UNITS: 100 INJECTION, SOLUTION SUBCUTANEOUS at 21:16

## 2023-08-25 RX ADMIN — FAMOTIDINE 20 MG: 20 TABLET, FILM COATED ORAL at 08:21

## 2023-08-25 RX ADMIN — ACETAMINOPHEN 650 MG: 325 TABLET ORAL at 21:16

## 2023-08-25 RX ADMIN — SODIUM CHLORIDE, PRESERVATIVE FREE 10 ML: 5 INJECTION INTRAVENOUS at 21:17

## 2023-08-25 RX ADMIN — Medication: at 21:17

## 2023-08-25 RX ADMIN — ACETAMINOPHEN 650 MG: 325 TABLET ORAL at 02:17

## 2023-08-25 NOTE — CARE COORDINATION
Transition of Care Plan:     RUR: 15%  Prior Level of Functioning: Independent  Disposition: IPR  If SNF or IPR: Date FOC offered: Pending   Date FOC received: Pending   Accepting facility: Pending   Date authorization started with reference number: N/A  Date authorization received and expires: N/A  Follow up appointments: TBD  DME needed: TBD  Transportation at discharge: TBD  IM/IMM Medicare/ letter given: 2nd IMM needs to be given   Is patient a [place] and connected with VA? N/A              If yes, was [employer] transfer form completed and VA notified? N/A  Caregiver Contact: [facility]  Discharge Caregiver contacted prior to discharge? Pt and spouse will be contacted at d/c  Care Conference needed? N/A  Barriers to discharge:  Placement    CM spoke to pt regarding that she is medically stable for d/c today. Pt is agreeable for CM to sent referrals to [facility], [facility] and [facility]. At this time [facility] and [facility] will not have a bed until next week and [facility] potential bed over the weekend. CM can follow up with [facility] over the weekend regarding beds. CM can either call Warren Ch at [phone] or Moreno Graham at [phone]    CM will continue to follow and assist with d/c planning.      Erik Rios

## 2023-08-25 NOTE — PROGRESS NOTES
End of Shift Note    Bedside shift change report given to Rosalva Garcia (oncoming nurse) by Emily Zhu RN (offgoing nurse). Report included the following information SBAR, Kardex, MAR, and Recent Results    Shift worked:  night     Shift summary and any significant changes:     Alert and oriented; up with 2 assist; on foleys due to retention; continue PT/OT     Concerns for physician to address:       Zone phone for oncoming shift:          Activity:     Number times ambulated in hallways past shift: 0  Number of times OOB to chair past shift: 1    Cardiac:   Cardiac Monitoring: No           Access:  Current line(s): PIV     Genitourinary:   Urinary status: mclean    Respiratory:      Chronic home O2 use?: NO  Incentive spirometer at bedside: N/A       GI:     Current diet:  ADULT ORAL NUTRITION SUPPLEMENT; Breakfast, Lunch, Dinner; Diabetic Oral Supplement  ADULT DIET;  Regular; 3 carb choices (45 gm/meal)  Passing flatus: YES  Tolerating current diet: YES       Pain Management:   Patient states pain is manageable on current regimen: YES    Skin:     Interventions: increase time out of bed    Patient Safety:  Fall Score:    Interventions: bed/chair alarm and pt to call before getting OOB       Length of Stay:  Expected LOS: 3  Actual LOS: 2      Emily Zhu RN

## 2023-08-25 NOTE — PLAN OF CARE
Problem: Physical Therapy - Adult  Goal: By Discharge: Performs mobility at highest level of function for planned discharge setting. See evaluation for individualized goals. Description: FUNCTIONAL STATUS PRIOR TO ADMISSION: Patient was independent without use of DME.    HOME SUPPORT PRIOR TO ADMISSION: The patient lived with spouse who assists as needed. Patient reports they live in a \"mother-in-law\" cabin attached to her sons house. Physical Therapy Goals  Initiated 8/23/2023  1. Patient will move from supine to sit and sit to supine, scoot up and down, and roll side to side in bed with minimal assistance within 7 day(s). 2.  Patient will perform sit to stand with contact guard assist within 7 day(s). 3.  Patient will transfer from bed to chair and chair to bed with contact guard assist using the least restrictive device within 7 day(s). 4.  Patient will ambulate with contact guard assist for 50 feet with the least restrictive device within 7 day(s). Outcome: Progressing   PHYSICAL THERAPY TREATMENT    Patient: Maday Baldwin (68 y.o. female)  Date: 8/25/2023  Diagnosis: Closed right hip fracture, initial encounter ([address]) [S72.001A] Closed right hip fracture, initial encounter ([address])  Procedure(s) (LRB):  HIP HEMIARTHROPLASTY (Right) 3 Days Post-Op  Precautions: Weight Bearing Right Lower Extremity Weight Bearing: Weight Bearing As Tolerated           Hip Precautions: Posterior hip precautions    ASSESSMENT:  Patient continues to benefit from skilled PT services and continues to make steady progress in the acute setting with therapy services. Pt is able to slightly improve with functional mobility, only requiring Bet[place] x2 with transitions with bed mobility. Pt still requires LE management to come sitting EOB and stands this session with Betina x1. Pt does have some decreased safety awareness and still requires cues for safety with proper hand placements during sit<>stand transfers.  Pt ambulated

## 2023-08-25 NOTE — PLAN OF CARE
Problem: Occupational Therapy - Adult  Goal: By Discharge: Performs self-care activities at highest level of function for planned discharge setting. See evaluation for individualized goals. Description: FUNCTIONAL STATUS PRIOR TO ADMISSION:  Pt reports being independent with ADLs and functional mobility. ADL Assistance: Independent,  ,  ,  ,  ,  , Homemaking Assistance: Independent, Ambulation Assistance: Independent, Transfer Assistance: Independent, Active : Yes     HOME SUPPORT:   Occupational Therapy Goals:  Initiated 8/23/2023    1. Patient will perform lower body dressing with AE PRN with Minimal Assist within 7 day(s). 2. Patient will perform grooming ADLs with supervision in standing within 7 days. 3. Patient will perform toilet transfers with Minimal Assist within 7 days. 4. Patient will perform all aspects of toileting at Minimal Assist within 7 days. 5. Patient will utilize energy conservation techniques during functional activities without cues within 7 day(s). 6. Patient will adhere to posterior hip precautions without cues within 7 days. Outcome: Progressing   OCCUPATIONAL THERAPY TREATMENT  Patient: Shay Porter (56 y.o. female)  Date: 8/25/2023  Primary Diagnosis: Closed right hip fracture, initial encounter ([address] [facility]) [S72.001A]  Procedure(s) (LRB):  HIP HEMIARTHROPLASTY (Right) 3 Days Post-Op   Precautions: Weight Bearing Right Lower Extremity Weight Bearing: Weight Bearing As Tolerated           Hip Precautions: Posterior hip precautions  Chart, occupational therapy assessment, plan of care, and goals were reviewed. ASSESSMENT  Patient continues to benefit from skilled OT services and is progressing towards goals. Pt received in bed, alert and oriented x3. Pt able to recall 2/3 posterior hip precautions and maintain adherence with min cues. Pt participated in bed mobility, functional transfers, functional mobility, and AE training for LB dressing.  Pt overall [place] for bed

## 2023-08-26 VITALS
RESPIRATION RATE: 16 BRPM | HEART RATE: 84 BPM | SYSTOLIC BLOOD PRESSURE: 156 MMHG | WEIGHT: 130 LBS | HEIGHT: 64 IN | OXYGEN SATURATION: 98 % | BODY MASS INDEX: 22.2 KG/M2 | DIASTOLIC BLOOD PRESSURE: 72 MMHG | TEMPERATURE: 98.3 F

## 2023-08-26 LAB
ANION GAP SERPL CALC-SCNC: 4 MMOL/L (ref 5–15)
BUN SERPL-MCNC: 14 MG/DL (ref 6–20)
BUN/CREAT SERPL: 25 (ref 12–20)
CALCIUM SERPL-MCNC: 9 MG/DL (ref 8.5–10.1)
CHLORIDE SERPL-SCNC: 96 MMOL/L (ref 97–108)
CO2 SERPL-SCNC: 32 MMOL/L (ref 21–32)
CREAT SERPL-MCNC: 0.55 MG/DL (ref 0.55–1.02)
GLUCOSE BLD STRIP.AUTO-MCNC: 245 MG/DL (ref 65–117)
GLUCOSE BLD STRIP.AUTO-MCNC: 342 MG/DL (ref 65–117)
GLUCOSE SERPL-MCNC: 244 MG/DL (ref 65–100)
HCT VFR BLD AUTO: 30 % (ref 35–47)
HGB BLD-MCNC: 9.7 G/DL (ref 11.5–16)
POTASSIUM SERPL-SCNC: 3.7 MMOL/L (ref 3.5–5.1)
SERVICE CMNT-IMP: ABNORMAL
SERVICE CMNT-IMP: ABNORMAL
SODIUM SERPL-SCNC: 132 MMOL/L (ref 136–145)

## 2023-08-26 PROCEDURE — 80048 BASIC METABOLIC PNL TOTAL CA: CPT

## 2023-08-26 PROCEDURE — 82962 GLUCOSE BLOOD TEST: CPT

## 2023-08-26 PROCEDURE — 85014 HEMATOCRIT: CPT

## 2023-08-26 PROCEDURE — 6370000000 HC RX 637 (ALT 250 FOR IP): Performed by: STUDENT IN AN ORGANIZED HEALTH CARE EDUCATION/TRAINING PROGRAM

## 2023-08-26 PROCEDURE — 6370000000 HC RX 637 (ALT 250 FOR IP): Performed by: ORTHOPAEDIC SURGERY

## 2023-08-26 PROCEDURE — 36415 COLL VENOUS BLD VENIPUNCTURE: CPT

## 2023-08-26 PROCEDURE — 85018 HEMOGLOBIN: CPT

## 2023-08-26 PROCEDURE — 6360000002 HC RX W HCPCS: Performed by: ORTHOPAEDIC SURGERY

## 2023-08-26 RX ORDER — ENOXAPARIN SODIUM 100 MG/ML
40 INJECTION SUBCUTANEOUS DAILY
Qty: 8.4 ML | Refills: 0 | Status: SHIPPED
Start: 2023-08-27 | End: 2023-09-17

## 2023-08-26 RX ADMIN — ACETAMINOPHEN 650 MG: 325 TABLET ORAL at 08:53

## 2023-08-26 RX ADMIN — BISACODYL 5 MG: 5 TABLET, COATED ORAL at 08:53

## 2023-08-26 RX ADMIN — DIGOXIN 0.12 MG: 125 TABLET ORAL at 08:53

## 2023-08-26 RX ADMIN — FAMOTIDINE 20 MG: 20 TABLET, FILM COATED ORAL at 08:53

## 2023-08-26 RX ADMIN — Medication 12 UNITS: at 12:15

## 2023-08-26 RX ADMIN — ACETAMINOPHEN 650 MG: 325 TABLET ORAL at 12:16

## 2023-08-26 RX ADMIN — Medication 4 UNITS: at 08:53

## 2023-08-26 RX ADMIN — SENNOSIDES AND DOCUSATE SODIUM 1 TABLET: 50; 8.6 TABLET ORAL at 08:53

## 2023-08-26 RX ADMIN — Medication: at 08:55

## 2023-08-26 RX ADMIN — ENOXAPARIN SODIUM 40 MG: 100 INJECTION SUBCUTANEOUS at 08:54

## 2023-08-26 ASSESSMENT — PAIN SCALES - GENERAL: PAINLEVEL_OUTOF10: 0

## 2023-08-26 NOTE — PLAN OF CARE
Problem: Pain  Goal: Verbalizes/displays adequate comfort level or baseline comfort level  Outcome: Progressing     Problem: Skin/Tissue Integrity  Goal: Absence of new skin breakdown  Description: 1. Monitor for areas of redness and/or skin breakdown  2. Assess vascular access sites hourly  3. Every 4-6 hours minimum:  Change oxygen saturation probe site  4. Every 4-6 hours:  If on nasal continuous positive airway pressure, respiratory therapy assess nares and determine need for appliance change or resting period. Outcome: Progressing     Problem: Safety - Adult  Goal: Free from fall injury  Outcome: Progressing  Flowsheets (Taken 8/26/2023 0000 by Mariana Lynch RN)  Free From Fall Injury:   Instruct family/caregiver on patient safety   Based on caregiver fall risk screen, instruct family/caregiver to ask for assistance with transferring infant if caregiver noted to have fall risk factors     Problem: ABCDS Injury Assessment  Goal: Absence of physical injury  Outcome: Progressing     Problem: Neurosensory - Adult  Goal: Achieves stable or improved neurological status  Outcome: Progressing  Flowsheets (Taken 8/26/2023 0749)  Achieves stable or improved neurological status: Assess for and report changes in neurological status  Goal: Absence of seizures  Outcome: Progressing  Flowsheets (Taken 8/26/2023 0749)  Absence of seizures: Monitor for seizure activity.   If seizure occurs, document type and location of movements and any associated apnea  Goal: Remains free of injury related to seizures activity  Outcome: Progressing  Flowsheets (Taken 8/26/2023 0749)  Remains free of injury related to seizure activity: Maintain airway, patient safety  and administer oxygen as ordered  Goal: Achieves maximal functionality and self care  Outcome: Progressing  Flowsheets (Taken 8/26/2023 0749)  Achieves maximal functionality and self care: Monitor swallowing and airway patency with patient fatigue and changes in

## 2023-08-26 NOTE — PROGRESS NOTES
Patient agitated. Verbalized she is being kidnapped. Paranoid. Sitting on side of bed and refusing to stay in bed. Calmly tried to reassure pt. Prayed with patient. Called son and daughter to talk with pt. On phone. Son is coming to stay with pt. Pct at bedside until son arrives.

## 2023-08-26 NOTE — PLAN OF CARE
Problem: Pain  Goal: Verbalizes/displays adequate comfort level or baseline comfort level  8/26/2023 1432 by Kandy Dobson RN  Outcome: Adequate for Discharge  8/26/2023 1019 by Kandy Dobson RN  Outcome: Adequate for Discharge  8/26/2023 1018 by Kandy Dobson RN  Outcome: Progressing     Problem: Skin/Tissue Integrity  Goal: Absence of new skin breakdown  Description: 1. Monitor for areas of redness and/or skin breakdown  2. Assess vascular access sites hourly  3. Every 4-6 hours minimum:  Change oxygen saturation probe site  4. Every 4-6 hours:  If on nasal continuous positive airway pressure, respiratory therapy assess nares and determine need for appliance change or resting period.   8/26/2023 1432 by Kandy Dobson RN  Outcome: Adequate for Discharge  8/26/2023 1019 by Kandy Dobson RN  Outcome: Adequate for Discharge  8/26/2023 1018 by Kandy Dobson RN  Outcome: Progressing     Problem: Safety - Adult  Goal: Free from fall injury  8/26/2023 1432 by Kandy Dobson RN  Outcome: Adequate for Discharge  8/26/2023 1019 by Kandy Dobson RN  Outcome: Adequate for Discharge  8/26/2023 1018 by Kandy Dobson RN  Outcome: Progressing  Flowsheets (Taken 8/26/2023 0000 by Michaela Ware RN)  Free From Fall Injury:   Instruct family/caregiver on patient safety   Based on caregiver fall risk screen, instruct family/caregiver to ask for assistance with transferring infant if caregiver noted to have fall risk factors     Problem: ABCDS Injury Assessment  Goal: Absence of physical injury  8/26/2023 1432 by Kandy Dobson RN  Outcome: Adequate for Discharge  8/26/2023 1019 by Kandy Dobson RN  Outcome: Adequate for Discharge  8/26/2023 1018 by Kandy Dobson RN  Outcome: Progressing     Problem: Neurosensory - Adult  Goal: Achieves stable or improved neurological status  8/26/2023 1432 by Kandy Dobson RN  Outcome: Adequate for Discharge  8/26/2023 1019 by Kandy Dobson Problem: Chronic Conditions and Co-morbidities  Goal: Patient's chronic conditions and co-morbidity symptoms are monitored and maintained or improved  8/26/2023 1432 by Adin Daigle RN  Outcome: Adequate for Discharge  8/26/2023 1019 by Adin Daigle RN  Outcome: Adequate for Discharge  8/26/2023 1018 by Adin Daigle RN  Outcome: Progressing

## 2023-08-26 NOTE — DISCHARGE SUMMARY
Discharge Summary    Name: Aung Barnes  [id number]  YOB: 1935 (Age: 81 y.o.)   Date of Admission: 8/21/2023  Date of Discharge: 8/26/2023  Attending Physician: Karina Payne MD    Discharge Diagnosis:   Mechanical fall resulting in mildly displaced right femoral neck fracture  Osteoporosis  Right hip arthroplasty 8/22/2023  Non-insulin-dependent diabetes mellitus  Atrial fibrillation on digoxin not on anticoagulation    Consultations:  IP CONSULT TO ORTHOPEDIC SURGERY  IP CONSULT TO CASE MANAGEMENT      Brief Admission History/Reason for Admission Per Shantelle Tavarez DO:   CHIEF COMPLAINT: Mechanical fall     HISTORY OF PRESENT ILLNESS:     Matthieu Badillo is a 81 y.o.  female with PMHx as listed below presenting to emergency department following mechanical fall from a stool resulting in right mildly displaced femoral neck fracture. Patient denies other injury and did not strike her head. ROS otherwise negative. Denies tobacco, alcohol, illicit drugs. In the ED, patient afebrile and hemodynamically stable (hypertensive 160s/60s, saturating mid 90s on room air. CXR negative for acute process. Right hip radiographs demonstrate mildly displaced right femoral neck fracture. Labs demonstrate: Unremarkable UA, sodium 135, potassium 4.5, glucose 204, BUN 19, creatinine 0.67, INR 1.1, WBC 11.7, hemoglobin 12.2, platelets 159. Orthopedics consulted with plans for operative intervention in the morning. We were asked to admit for work up and evaluation of the above problems. Brief Hospital Course by Main Problems: This is a 81-year-old female with past medical history of diabetes mellitus, A-fib who presented to the ED after mechanical fall, was found to have fracture, underwent right hip imaging arthroplasty . her hospital course was on complicated by a day of hospital delirium which quickly resolved .  P[t had urinary retention for which a mclean

## 2023-08-26 NOTE — PROGRESS NOTES
Was in chair until 2100 oriented at start of shift and now confused saying she is hallucinating . Has called the switchboard due to fear. Sat in room with pt. And calmly spoke with her to reassure her. Will continue to speak calmly with pt. Round q1hr.   Bed alarm on

## 2023-08-26 NOTE — PROGRESS NOTES
Hospital to home given discharge folder containing: copy of emtala form, AVS, MAR report, H&P, discharge summary, and facesheet. Piv taken out with cath tip intact. Pt discharged with mclean per MD due to acute retention, [facility] nurse notified during report, report given to Mikayla Yee RN, opportunities for questions given.

## 2023-08-26 NOTE — PROGRESS NOTES
End of Shift Note    Bedside shift change report given to Erin RAMOS (oncoming nurse) by Ulices Faulkner RN (offgoing nurse). Report included the following information SBAR, Kardex, ED Summary, OR Summary, Procedure Summary, Intake/Output, MAR, and Recent Results    Shift worked:  7p-7a     Shift summary and any significant changes:     Agitated and confused overnight with paranoid statements and fear of hallucinations and that she has been \"kidnapped\"  . Telesitter was at bedside but pt. Increased in agitation and continued to try to get out of bed. Son called and came in to sit at bedside   Video monitor removed     Concerns for physician to address:  As above       Zone phone for oncoming shift:          Activity:     Number times ambulated in hallways past shift: 0  Number of times OOB to chair past shift: 2    Cardiac:   Cardiac Monitoring: No           Access:  Current line(s): PIV     Genitourinary:   Urinary status: mclean    Respiratory:      Chronic home O2 use?: NO  Incentive spirometer at bedside: YES       GI:     Current diet:  ADULT ORAL NUTRITION SUPPLEMENT; Breakfast, Lunch, Dinner; Diabetic Oral Supplement  ADULT DIET; Regular; 3 carb choices (45 gm/meal)  Passing flatus: YES  Tolerating current diet: YES       Pain Management:   Patient states pain is manageable on current regimen: YES    Skin:     Interventions: float heels, increase time out of bed, PT/OT consult, limit briefs, internal/external urinary devices, and nutritional support    Patient Safety:  Fall Score:    Interventions: bed/chair alarm, assistive device (walker, cane.  etc), gripper socks, pt to call before getting OOB, stay with me (per policy), and gait belt       Length of Stay:  Expected LOS: 3  Actual LOS: RICHARD Geronimo

## 2023-08-26 NOTE — CARE COORDINATION
Transition of Care Plan:     RUR: 15%  Prior Level of Functioning: Independent  Disposition: [facility]   If SNF or IPR: Date FOC offered: 8/25/23  Date FOC received: 8/25/23  Accepting facility: [facility]   Date authorization started with reference number: N/A  Date authorization received and expires: N/A  Follow up appointments: PCP & specialists as indicated  DME needed: TBD by rehab  Transportation at discharge: Hospital to Home  IM/IMM Medicare/ letter given: 2nd IMM needs to be given   Is patient a [place] and connected with [facility]? N/A              If yes, was [place] transfer form completed and VA notified? N/A  Caregiver Contact: [facility]  Discharge Caregiver contacted prior to discharge? Yes  Care Conference needed? N/A  Barriers to discharge:  None    8:47AM UPDATE  CM received call from [facility], [phone]) reporting that they do have a bed available for pt today if medically stable for d/c. If so, transport to be setup no earlier than 2:00PM. CM contacted MD via [facility] to determine if pt is stable given overnight notes from nursing. 10:22AM UPDATE  CM notified by MD that pt is medically stable for d/c. CM called back [facility] Liaison. [facility] requesting clarification of mental status. Informed by nursing that pt is alert & oriented x3 (not to situation) and following commands appropriately at this time. Due to pt's overnight agitation, requesting if pt's son would be willing to potentially stay with her this evening to help ease with the transition. CM to discuss with son once returns to bedside. 10:45AM UPDATE  Spoke with pt, spouse, and son at bedside. Son agreeable to staying with pt overnight to assist with transition. CM called back [facility] to notify. CM called [facility] to setup transport. 11:15AM UPDATE  [facility] ([phone]) report earliest transport time is 6:30PM. Nursing contacted Nursing Supervisor, approval received to arrange [facility] transportation.      11:30AM

## 2024-01-08 ENCOUNTER — APPOINTMENT (OUTPATIENT)
Facility: HOSPITAL | Age: 89
End: 2024-01-08
Payer: MEDICARE

## 2024-01-08 ENCOUNTER — HOSPITAL ENCOUNTER (INPATIENT)
Facility: HOSPITAL | Age: 89
LOS: 4 days | Discharge: HOME | End: 2024-01-12
Attending: EMERGENCY MEDICINE | Admitting: INTERNAL MEDICINE
Payer: MEDICARE

## 2024-01-08 DIAGNOSIS — J18.9 PNEUMONIA OF RIGHT MIDDLE LOBE DUE TO INFECTIOUS ORGANISM: Primary | ICD-10-CM

## 2024-01-08 DIAGNOSIS — R60.0 BILATERAL LEG EDEMA: ICD-10-CM

## 2024-01-08 LAB
ALBUMIN SERPL-MCNC: 3.9 G/DL (ref 3.5–5)
ALBUMIN/GLOB SERPL: 1 (ref 1.1–2.2)
ALP SERPL-CCNC: 73 U/L (ref 45–117)
ALT SERPL-CCNC: 15 U/L (ref 12–78)
ANION GAP SERPL CALC-SCNC: 4 MMOL/L (ref 5–15)
AST SERPL-CCNC: 20 U/L (ref 15–37)
BASOPHILS # BLD: 0.1 K/UL (ref 0–0.1)
BASOPHILS NFR BLD: 1 % (ref 0–1)
BILIRUB SERPL-MCNC: 0.6 MG/DL (ref 0.2–1)
BUN SERPL-MCNC: 9 MG/DL (ref 6–20)
BUN/CREAT SERPL: 15 (ref 12–20)
CALCIUM SERPL-MCNC: 9.2 MG/DL (ref 8.5–10.1)
CHLORIDE SERPL-SCNC: 95 MMOL/L (ref 97–108)
CO2 SERPL-SCNC: 31 MMOL/L (ref 21–32)
CREAT SERPL-MCNC: 0.59 MG/DL (ref 0.55–1.02)
DIFFERENTIAL METHOD BLD: ABNORMAL
EOSINOPHIL # BLD: 0 K/UL (ref 0–0.4)
EOSINOPHIL NFR BLD: 0 % (ref 0–7)
ERYTHROCYTE [DISTWIDTH] IN BLOOD BY AUTOMATED COUNT: 15.2 % (ref 11.5–14.5)
GLOBULIN SER CALC-MCNC: 4.1 G/DL (ref 2–4)
GLUCOSE BLD STRIP.AUTO-MCNC: 165 MG/DL (ref 65–117)
GLUCOSE SERPL-MCNC: 237 MG/DL (ref 65–100)
HCT VFR BLD AUTO: 36 % (ref 35–47)
HGB BLD-MCNC: 11.4 G/DL (ref 11.5–16)
IMM GRANULOCYTES # BLD AUTO: 0 K/UL (ref 0–0.04)
IMM GRANULOCYTES NFR BLD AUTO: 0 % (ref 0–0.5)
LYMPHOCYTES # BLD: 0.4 K/UL (ref 0.8–3.5)
LYMPHOCYTES NFR BLD: 7 % (ref 12–49)
MAGNESIUM SERPL-MCNC: 1.5 MG/DL (ref 1.6–2.4)
MCH RBC QN AUTO: 27.9 PG (ref 26–34)
MCHC RBC AUTO-ENTMCNC: 31.7 G/DL (ref 30–36.5)
MCV RBC AUTO: 88.2 FL (ref 80–99)
MONOCYTES # BLD: 0.5 K/UL (ref 0–1)
MONOCYTES NFR BLD: 10 % (ref 5–13)
NEUTS SEG # BLD: 4.1 K/UL (ref 1.8–8)
NEUTS SEG NFR BLD: 82 % (ref 32–75)
NRBC # BLD: 0 K/UL (ref 0–0.01)
NRBC BLD-RTO: 0 PER 100 WBC
NT PRO BNP: 1305 PG/ML
PLATELET # BLD AUTO: 207 K/UL (ref 150–400)
PMV BLD AUTO: 8.9 FL (ref 8.9–12.9)
POTASSIUM SERPL-SCNC: 4.1 MMOL/L (ref 3.5–5.1)
PROT SERPL-MCNC: 8 G/DL (ref 6.4–8.2)
RBC # BLD AUTO: 4.08 M/UL (ref 3.8–5.2)
RBC MORPH BLD: ABNORMAL
SARS-COV-2 RDRP RESP QL NAA+PROBE: NOT DETECTED
SERVICE CMNT-IMP: ABNORMAL
SODIUM SERPL-SCNC: 130 MMOL/L (ref 136–145)
SOURCE: NORMAL
TROPONIN I SERPL HS-MCNC: 37 NG/L (ref 0–51)
WBC # BLD AUTO: 5.1 K/UL (ref 3.6–11)

## 2024-01-08 PROCEDURE — 6370000000 HC RX 637 (ALT 250 FOR IP): Performed by: GENERAL ACUTE CARE HOSPITAL

## 2024-01-08 PROCEDURE — 80053 COMPREHEN METABOLIC PANEL: CPT

## 2024-01-08 PROCEDURE — 83735 ASSAY OF MAGNESIUM: CPT

## 2024-01-08 PROCEDURE — 99285 EMERGENCY DEPT VISIT HI MDM: CPT

## 2024-01-08 PROCEDURE — 2580000003 HC RX 258: Performed by: GENERAL ACUTE CARE HOSPITAL

## 2024-01-08 PROCEDURE — 6360000004 HC RX CONTRAST MEDICATION: Performed by: EMERGENCY MEDICINE

## 2024-01-08 PROCEDURE — 71275 CT ANGIOGRAPHY CHEST: CPT

## 2024-01-08 PROCEDURE — 36415 COLL VENOUS BLD VENIPUNCTURE: CPT

## 2024-01-08 PROCEDURE — 83880 ASSAY OF NATRIURETIC PEPTIDE: CPT

## 2024-01-08 PROCEDURE — 2500000003 HC RX 250 WO HCPCS: Performed by: GENERAL ACUTE CARE HOSPITAL

## 2024-01-08 PROCEDURE — 93005 ELECTROCARDIOGRAM TRACING: CPT | Performed by: EMERGENCY MEDICINE

## 2024-01-08 PROCEDURE — 1100000003 HC PRIVATE W/ TELEMETRY

## 2024-01-08 PROCEDURE — 82962 GLUCOSE BLOOD TEST: CPT

## 2024-01-08 PROCEDURE — 2580000003 HC RX 258: Performed by: STUDENT IN AN ORGANIZED HEALTH CARE EDUCATION/TRAINING PROGRAM

## 2024-01-08 PROCEDURE — 6360000002 HC RX W HCPCS: Performed by: STUDENT IN AN ORGANIZED HEALTH CARE EDUCATION/TRAINING PROGRAM

## 2024-01-08 PROCEDURE — 87899 AGENT NOS ASSAY W/OPTIC: CPT

## 2024-01-08 PROCEDURE — 85025 COMPLETE CBC W/AUTO DIFF WBC: CPT

## 2024-01-08 PROCEDURE — 71045 X-RAY EXAM CHEST 1 VIEW: CPT

## 2024-01-08 PROCEDURE — 87635 SARS-COV-2 COVID-19 AMP PRB: CPT

## 2024-01-08 PROCEDURE — 84484 ASSAY OF TROPONIN QUANT: CPT

## 2024-01-08 RX ORDER — ONDANSETRON 4 MG/1
4 TABLET, ORALLY DISINTEGRATING ORAL EVERY 8 HOURS PRN
Status: DISCONTINUED | OUTPATIENT
Start: 2024-01-08 | End: 2024-01-12 | Stop reason: HOSPADM

## 2024-01-08 RX ORDER — DIGOXIN 125 MCG
0.12 TABLET ORAL DAILY
Status: DISCONTINUED | OUTPATIENT
Start: 2024-01-09 | End: 2024-01-12 | Stop reason: HOSPADM

## 2024-01-08 RX ORDER — SODIUM CHLORIDE 0.9 % (FLUSH) 0.9 %
5-40 SYRINGE (ML) INJECTION EVERY 12 HOURS SCHEDULED
Status: DISCONTINUED | OUTPATIENT
Start: 2024-01-08 | End: 2024-01-12 | Stop reason: HOSPADM

## 2024-01-08 RX ORDER — GUAIFENESIN 600 MG/1
600 TABLET, EXTENDED RELEASE ORAL 2 TIMES DAILY
Status: DISCONTINUED | OUTPATIENT
Start: 2024-01-08 | End: 2024-01-12 | Stop reason: HOSPADM

## 2024-01-08 RX ORDER — INSULIN LISPRO 100 [IU]/ML
0-8 INJECTION, SOLUTION INTRAVENOUS; SUBCUTANEOUS
Status: DISCONTINUED | OUTPATIENT
Start: 2024-01-09 | End: 2024-01-12 | Stop reason: HOSPADM

## 2024-01-08 RX ORDER — POLYETHYLENE GLYCOL 3350 17 G/17G
17 POWDER, FOR SOLUTION ORAL DAILY PRN
Status: DISCONTINUED | OUTPATIENT
Start: 2024-01-08 | End: 2024-01-12 | Stop reason: HOSPADM

## 2024-01-08 RX ORDER — SODIUM CHLORIDE 0.9 % (FLUSH) 0.9 %
5-40 SYRINGE (ML) INJECTION PRN
Status: DISCONTINUED | OUTPATIENT
Start: 2024-01-08 | End: 2024-01-12 | Stop reason: HOSPADM

## 2024-01-08 RX ORDER — INSULIN LISPRO 100 [IU]/ML
0-4 INJECTION, SOLUTION INTRAVENOUS; SUBCUTANEOUS NIGHTLY
Status: DISCONTINUED | OUTPATIENT
Start: 2024-01-08 | End: 2024-01-12 | Stop reason: HOSPADM

## 2024-01-08 RX ORDER — ACETAMINOPHEN 650 MG/1
650 SUPPOSITORY RECTAL EVERY 6 HOURS PRN
Status: DISCONTINUED | OUTPATIENT
Start: 2024-01-08 | End: 2024-01-12 | Stop reason: HOSPADM

## 2024-01-08 RX ORDER — ACETAMINOPHEN 325 MG/1
650 TABLET ORAL EVERY 6 HOURS PRN
Status: DISCONTINUED | OUTPATIENT
Start: 2024-01-08 | End: 2024-01-12 | Stop reason: HOSPADM

## 2024-01-08 RX ORDER — SODIUM CHLORIDE 9 MG/ML
INJECTION, SOLUTION INTRAVENOUS PRN
Status: DISCONTINUED | OUTPATIENT
Start: 2024-01-08 | End: 2024-01-12 | Stop reason: HOSPADM

## 2024-01-08 RX ORDER — POTASSIUM CHLORIDE 7.45 MG/ML
10 INJECTION INTRAVENOUS PRN
Status: DISCONTINUED | OUTPATIENT
Start: 2024-01-08 | End: 2024-01-10

## 2024-01-08 RX ORDER — DEXTROSE MONOHYDRATE 100 MG/ML
INJECTION, SOLUTION INTRAVENOUS CONTINUOUS PRN
Status: DISCONTINUED | OUTPATIENT
Start: 2024-01-08 | End: 2024-01-12 | Stop reason: HOSPADM

## 2024-01-08 RX ORDER — ENOXAPARIN SODIUM 100 MG/ML
40 INJECTION SUBCUTANEOUS DAILY
Status: DISCONTINUED | OUTPATIENT
Start: 2024-01-09 | End: 2024-01-12 | Stop reason: HOSPADM

## 2024-01-08 RX ORDER — POTASSIUM CHLORIDE 20 MEQ/1
40 TABLET, EXTENDED RELEASE ORAL PRN
Status: DISCONTINUED | OUTPATIENT
Start: 2024-01-08 | End: 2024-01-10

## 2024-01-08 RX ORDER — MAGNESIUM SULFATE IN WATER 40 MG/ML
2000 INJECTION, SOLUTION INTRAVENOUS PRN
Status: DISCONTINUED | OUTPATIENT
Start: 2024-01-08 | End: 2024-01-12 | Stop reason: HOSPADM

## 2024-01-08 RX ORDER — IPRATROPIUM BROMIDE AND ALBUTEROL SULFATE 2.5; .5 MG/3ML; MG/3ML
1 SOLUTION RESPIRATORY (INHALATION) EVERY 4 HOURS PRN
Status: DISCONTINUED | OUTPATIENT
Start: 2024-01-08 | End: 2024-01-12 | Stop reason: HOSPADM

## 2024-01-08 RX ORDER — ONDANSETRON 2 MG/ML
4 INJECTION INTRAMUSCULAR; INTRAVENOUS EVERY 6 HOURS PRN
Status: DISCONTINUED | OUTPATIENT
Start: 2024-01-08 | End: 2024-01-12 | Stop reason: HOSPADM

## 2024-01-08 RX ADMIN — SODIUM CHLORIDE, PRESERVATIVE FREE 10 ML: 5 INJECTION INTRAVENOUS at 23:45

## 2024-01-08 RX ADMIN — CEFTRIAXONE SODIUM 1000 MG: 1 INJECTION, POWDER, FOR SOLUTION INTRAMUSCULAR; INTRAVENOUS at 21:23

## 2024-01-08 RX ADMIN — GUAIFENESIN 600 MG: 600 TABLET, EXTENDED RELEASE ORAL at 23:46

## 2024-01-08 RX ADMIN — IOPAMIDOL 100 ML: 755 INJECTION, SOLUTION INTRAVENOUS at 18:13

## 2024-01-08 RX ADMIN — DOXYCYCLINE 100 MG: 100 INJECTION, POWDER, LYOPHILIZED, FOR SOLUTION INTRAVENOUS at 23:51

## 2024-01-08 ASSESSMENT — PAIN SCALES - GENERAL: PAINLEVEL_OUTOF10: 0

## 2024-01-09 ENCOUNTER — APPOINTMENT (OUTPATIENT)
Facility: HOSPITAL | Age: 89
End: 2024-01-09
Payer: MEDICARE

## 2024-01-09 LAB
ALBUMIN SERPL-MCNC: 3.9 G/DL (ref 3.5–5)
ALBUMIN/GLOB SERPL: 1 (ref 1.1–2.2)
ALP SERPL-CCNC: 73 U/L (ref 45–117)
ALT SERPL-CCNC: 15 U/L (ref 12–78)
ANION GAP SERPL CALC-SCNC: 4 MMOL/L (ref 5–15)
AST SERPL-CCNC: 23 U/L (ref 15–37)
B PERT DNA SPEC QL NAA+PROBE: NOT DETECTED
BASOPHILS # BLD: 0 K/UL (ref 0–0.1)
BASOPHILS NFR BLD: 0 % (ref 0–1)
BILIRUB SERPL-MCNC: 0.4 MG/DL (ref 0.2–1)
BORDETELLA PARAPERTUSSIS BY PCR: NOT DETECTED
BUN SERPL-MCNC: 10 MG/DL (ref 6–20)
BUN/CREAT SERPL: 18 (ref 12–20)
C PNEUM DNA SPEC QL NAA+PROBE: NOT DETECTED
CALCIUM SERPL-MCNC: 8.9 MG/DL (ref 8.5–10.1)
CHLORIDE SERPL-SCNC: 95 MMOL/L (ref 97–108)
CO2 SERPL-SCNC: 30 MMOL/L (ref 21–32)
CREAT SERPL-MCNC: 0.56 MG/DL (ref 0.55–1.02)
CRP SERPL-MCNC: 6.22 MG/DL (ref 0–0.6)
DIFFERENTIAL METHOD BLD: ABNORMAL
EKG ATRIAL RATE: 86 BPM
EKG DIAGNOSIS: NORMAL
EKG P AXIS: 69 DEGREES
EKG P-R INTERVAL: 164 MS
EKG Q-T INTERVAL: 354 MS
EKG QRS DURATION: 72 MS
EKG QTC CALCULATION (BAZETT): 423 MS
EKG R AXIS: 36 DEGREES
EKG T AXIS: 58 DEGREES
EKG VENTRICULAR RATE: 86 BPM
EOSINOPHIL # BLD: 0 K/UL (ref 0–0.4)
EOSINOPHIL NFR BLD: 0 % (ref 0–7)
ERYTHROCYTE [DISTWIDTH] IN BLOOD BY AUTOMATED COUNT: 15.1 % (ref 11.5–14.5)
FLUAV H1 2009 PAND RNA SPEC QL NAA+PROBE: DETECTED
FLUBV RNA SPEC QL NAA+PROBE: NOT DETECTED
GLOBULIN SER CALC-MCNC: 4.1 G/DL (ref 2–4)
GLUCOSE BLD STRIP.AUTO-MCNC: 158 MG/DL (ref 65–117)
GLUCOSE BLD STRIP.AUTO-MCNC: 167 MG/DL (ref 65–117)
GLUCOSE BLD STRIP.AUTO-MCNC: 194 MG/DL (ref 65–117)
GLUCOSE BLD STRIP.AUTO-MCNC: 229 MG/DL (ref 65–117)
GLUCOSE SERPL-MCNC: 182 MG/DL (ref 65–100)
HADV DNA SPEC QL NAA+PROBE: NOT DETECTED
HCOV 229E RNA SPEC QL NAA+PROBE: NOT DETECTED
HCOV HKU1 RNA SPEC QL NAA+PROBE: NOT DETECTED
HCOV NL63 RNA SPEC QL NAA+PROBE: NOT DETECTED
HCOV OC43 RNA SPEC QL NAA+PROBE: NOT DETECTED
HCT VFR BLD AUTO: 36.4 % (ref 35–47)
HGB BLD-MCNC: 11.8 G/DL (ref 11.5–16)
HMPV RNA SPEC QL NAA+PROBE: NOT DETECTED
HPIV1 RNA SPEC QL NAA+PROBE: NOT DETECTED
HPIV2 RNA SPEC QL NAA+PROBE: NOT DETECTED
HPIV3 RNA SPEC QL NAA+PROBE: DETECTED
HPIV4 RNA SPEC QL NAA+PROBE: NOT DETECTED
IMM GRANULOCYTES # BLD AUTO: 0 K/UL (ref 0–0.04)
IMM GRANULOCYTES NFR BLD AUTO: 0 % (ref 0–0.5)
LYMPHOCYTES # BLD: 0.6 K/UL (ref 0.8–3.5)
LYMPHOCYTES NFR BLD: 13 % (ref 12–49)
M PNEUMO DNA SPEC QL NAA+PROBE: NOT DETECTED
M PNEUMO IGM SER IA-ACNC: NONREACTIVE
MCH RBC QN AUTO: 28 PG (ref 26–34)
MCHC RBC AUTO-ENTMCNC: 32.4 G/DL (ref 30–36.5)
MCV RBC AUTO: 86.3 FL (ref 80–99)
MONOCYTES # BLD: 0.7 K/UL (ref 0–1)
MONOCYTES NFR BLD: 15 % (ref 5–13)
NEUTS SEG # BLD: 3.6 K/UL (ref 1.8–8)
NEUTS SEG NFR BLD: 72 % (ref 32–75)
NRBC # BLD: 0 K/UL (ref 0–0.01)
NRBC BLD-RTO: 0 PER 100 WBC
PLATELET # BLD AUTO: 191 K/UL (ref 150–400)
PMV BLD AUTO: 8.5 FL (ref 8.9–12.9)
POTASSIUM SERPL-SCNC: 4.1 MMOL/L (ref 3.5–5.1)
PROCALCITONIN SERPL-MCNC: 0.08 NG/ML
PROT SERPL-MCNC: 8 G/DL (ref 6.4–8.2)
RBC # BLD AUTO: 4.22 M/UL (ref 3.8–5.2)
RSV RNA SPEC QL NAA+PROBE: NOT DETECTED
RV+EV RNA SPEC QL NAA+PROBE: NOT DETECTED
SARS-COV-2 RNA RESP QL NAA+PROBE: DETECTED
SERVICE CMNT-IMP: ABNORMAL
SODIUM SERPL-SCNC: 129 MMOL/L (ref 136–145)
WBC # BLD AUTO: 5 K/UL (ref 3.6–11)

## 2024-01-09 PROCEDURE — 93970 EXTREMITY STUDY: CPT

## 2024-01-09 PROCEDURE — 2580000003 HC RX 258: Performed by: GENERAL ACUTE CARE HOSPITAL

## 2024-01-09 PROCEDURE — 86140 C-REACTIVE PROTEIN: CPT

## 2024-01-09 PROCEDURE — 80053 COMPREHEN METABOLIC PANEL: CPT

## 2024-01-09 PROCEDURE — 6370000000 HC RX 637 (ALT 250 FOR IP): Performed by: INTERNAL MEDICINE

## 2024-01-09 PROCEDURE — 36415 COLL VENOUS BLD VENIPUNCTURE: CPT

## 2024-01-09 PROCEDURE — 6370000000 HC RX 637 (ALT 250 FOR IP): Performed by: GENERAL ACUTE CARE HOSPITAL

## 2024-01-09 PROCEDURE — 2500000003 HC RX 250 WO HCPCS: Performed by: GENERAL ACUTE CARE HOSPITAL

## 2024-01-09 PROCEDURE — 6360000002 HC RX W HCPCS: Performed by: GENERAL ACUTE CARE HOSPITAL

## 2024-01-09 PROCEDURE — 94640 AIRWAY INHALATION TREATMENT: CPT

## 2024-01-09 PROCEDURE — 87449 NOS EACH ORGANISM AG IA: CPT

## 2024-01-09 PROCEDURE — 0202U NFCT DS 22 TRGT SARS-COV-2: CPT

## 2024-01-09 PROCEDURE — 85025 COMPLETE CBC W/AUTO DIFF WBC: CPT

## 2024-01-09 PROCEDURE — 94760 N-INVAS EAR/PLS OXIMETRY 1: CPT

## 2024-01-09 PROCEDURE — 86738 MYCOPLASMA ANTIBODY: CPT

## 2024-01-09 PROCEDURE — 82962 GLUCOSE BLOOD TEST: CPT

## 2024-01-09 PROCEDURE — 2700000000 HC OXYGEN THERAPY PER DAY

## 2024-01-09 PROCEDURE — 6360000002 HC RX W HCPCS: Performed by: INTERNAL MEDICINE

## 2024-01-09 PROCEDURE — 84145 PROCALCITONIN (PCT): CPT

## 2024-01-09 PROCEDURE — 1100000003 HC PRIVATE W/ TELEMETRY

## 2024-01-09 PROCEDURE — 6360000002 HC RX W HCPCS: Performed by: PHYSICIAN ASSISTANT

## 2024-01-09 RX ORDER — OSELTAMIVIR PHOSPHATE 75 MG/1
75 CAPSULE ORAL 2 TIMES DAILY
Status: DISCONTINUED | OUTPATIENT
Start: 2024-01-09 | End: 2024-01-12 | Stop reason: HOSPADM

## 2024-01-09 RX ORDER — FUROSEMIDE 10 MG/ML
20 INJECTION INTRAMUSCULAR; INTRAVENOUS ONCE
Status: COMPLETED | OUTPATIENT
Start: 2024-01-09 | End: 2024-01-09

## 2024-01-09 RX ORDER — IPRATROPIUM BROMIDE AND ALBUTEROL SULFATE 2.5; .5 MG/3ML; MG/3ML
1 SOLUTION RESPIRATORY (INHALATION)
Status: DISCONTINUED | OUTPATIENT
Start: 2024-01-09 | End: 2024-01-12 | Stop reason: HOSPADM

## 2024-01-09 RX ORDER — DEXAMETHASONE 4 MG/1
6 TABLET ORAL DAILY
Status: DISCONTINUED | OUTPATIENT
Start: 2024-01-09 | End: 2024-01-11

## 2024-01-09 RX ADMIN — ENOXAPARIN SODIUM 40 MG: 100 INJECTION SUBCUTANEOUS at 10:23

## 2024-01-09 RX ADMIN — GUAIFENESIN 600 MG: 600 TABLET, EXTENDED RELEASE ORAL at 10:23

## 2024-01-09 RX ADMIN — SODIUM CHLORIDE: 9 INJECTION, SOLUTION INTRAVENOUS at 11:08

## 2024-01-09 RX ADMIN — DIGOXIN 0.12 MG: 125 TABLET ORAL at 10:23

## 2024-01-09 RX ADMIN — DOXYCYCLINE 100 MG: 100 INJECTION, POWDER, LYOPHILIZED, FOR SOLUTION INTRAVENOUS at 23:20

## 2024-01-09 RX ADMIN — SODIUM CHLORIDE, PRESERVATIVE FREE 10 ML: 5 INJECTION INTRAVENOUS at 22:11

## 2024-01-09 RX ADMIN — DOXYCYCLINE 100 MG: 100 INJECTION, POWDER, LYOPHILIZED, FOR SOLUTION INTRAVENOUS at 11:09

## 2024-01-09 RX ADMIN — INSULIN LISPRO 2 UNITS: 100 INJECTION, SOLUTION INTRAVENOUS; SUBCUTANEOUS at 14:04

## 2024-01-09 RX ADMIN — CEFTRIAXONE SODIUM 1000 MG: 1 INJECTION, POWDER, FOR SOLUTION INTRAMUSCULAR; INTRAVENOUS at 22:16

## 2024-01-09 RX ADMIN — SODIUM CHLORIDE, PRESERVATIVE FREE 10 ML: 5 INJECTION INTRAVENOUS at 10:24

## 2024-01-09 RX ADMIN — GUAIFENESIN 600 MG: 600 TABLET, EXTENDED RELEASE ORAL at 22:10

## 2024-01-09 RX ADMIN — ACETAMINOPHEN 650 MG: 325 TABLET ORAL at 10:23

## 2024-01-09 RX ADMIN — ACETAMINOPHEN 650 MG: 325 TABLET ORAL at 22:22

## 2024-01-09 RX ADMIN — FUROSEMIDE 20 MG: 10 INJECTION, SOLUTION INTRAMUSCULAR; INTRAVENOUS at 19:10

## 2024-01-09 RX ADMIN — OSELTAMIVIR PHOSPHATE 75 MG: 75 CAPSULE ORAL at 22:10

## 2024-01-09 RX ADMIN — ONDANSETRON 4 MG: 2 INJECTION INTRAMUSCULAR; INTRAVENOUS at 10:23

## 2024-01-09 RX ADMIN — OSELTAMIVIR PHOSPHATE 75 MG: 75 CAPSULE ORAL at 15:31

## 2024-01-09 RX ADMIN — DEXAMETHASONE 6 MG: 4 TABLET ORAL at 19:09

## 2024-01-09 RX ADMIN — IPRATROPIUM BROMIDE AND ALBUTEROL SULFATE 1 DOSE: 2.5; .5 SOLUTION RESPIRATORY (INHALATION) at 00:26

## 2024-01-09 NOTE — CONSULTS
Pulmonary, Critical Care, and Sleep Medicine~Consult Note    Name: Kamilla Lantigua MRN: [id number]   : 1935 Hospital: [facility]   Date: 2024 3:38 PM Admission: 2024     Impression Plan   Acute hypoxemic respiratory failure  Viral infections: flu A, parainfluenza, covid-19  Abnormal chest CT w/ findings suspicious for MAC; bronchiectasis; LLL spiculated opacity  Hyponatremia  DM Supp o2 for goal sats >90%  Tamiflu  Agree w/ dexamethasone  Empiric abx. F/u cx  Check CRP  I note hospitalist has already consulted pharmacy for Baricitinib   Obtain sputum cx for cx and AFB if able  Add nebs  Gentle diuresis  F/u echo  Dvt ppx: lovenox  Thank you for the consult, will follow     Daily Progression:    HPI: 87 y/o F with PMH DM, chronic pain who presented from [facility] with SOB, cough, and fever (101.4). Hypoxic requiring 4L O2.  Temp here 100 F    Labs: wbc 5, Na 129, pct low, bnp 1305  Initial rapid covid-19 and flu swabs negative  RVP positive for influenza A, parainfluenza, covid-19    CTA chest 24: No PE. Bronchiectasis and associated pericardial thickening and reticular nodular  opacities in the lingula and right middle lobe suspicious for chronic BOO or  other atypical infection. Subpleural left lower lobe spiculated opacity measuring 4 x 9 x 8 mm.     ROS: feeling better today. Started having SOB about 5 days ago. She's been coughing for about a month, previously productive of \"creamy\" sputum but now nonproductive. She's never had issues with her breathing before.  has also been sick.  She denies chest pain, dysphagia, indigestion    Currently on 2L O2 w/ o2 sat 96%    Social hx: never smoker    I have reviewed the labs and previous day’s notes.    Pertinent items are noted in HPI.  Past Medical History:   Diagnosis Date    Arrhythmia     PALPITATIONS    Bronchitis     Chronic pain     RT. SHOULDER    Diabetes (HCC)     TYPE II     sodium chloride 0.9 % 50 mL IVPB (mini-bag)  1,000 mg IntraVENous Q24H    doxycycline (VIBRAMYCIN) 100 mg in sodium chloride 0.9 % 100 mL IVPB (mini-bag)  100 mg IntraVENous Q12H       Labs:  ABG Invalid input(s): \"PHI\", \"PCO2I\", \"PO2I\", \"HCO3I\", \"SO2I\", \"FIO2I\"     CBC Recent Labs     01/08/24  1541 01/09/24  0402   WBC 5.1 5.0   HGB 11.4* 11.8   HCT 36.0 36.4    191   MCV 88.2 86.3   MCH 27.9 28.0        Metabolic  Panel Recent Labs     01/08/24  1541 01/09/24  0402   * 129*   K 4.1 4.1   CL 95* 95*   CO2 31 30   BUN 9 10   MG 1.5*  --    ALT 15 15        Pertinent Labs                AYAKA Miller  1/9/2024

## 2024-01-09 NOTE — H&P
Bronchitis, Chronic pain, Diabetes (HCC), and History of blood transfusion.     Patient started having shortness of breath, cough and fever (101.4), all started 2 days ago.  Symptoms getting worse and family had to bring the patient to urgent care.  At the urgent care, reportedly she was febrile and requiring 4 L of oxygen and so was sent to the emergency room for further management.  Patient was also complaining of nausea.  Son reported that patient started having swelling after hip fracture on last admission.    ED w/up showed sodium 130, chloride 95, magnesium 1.5, NT proBNP 1300, troponin normal, glucose 237, normal WBC, hemoglobin 11.4, normal platelet count.  Negative flu/rapid COVID.    CTA CHEST W WO CONTRAST    Result Date: 1/8/2024  1. No pulmonary embolus. 2. Bronchiectasis and associated pericardial thickening and reticular nodular opacities in the lingula and right middle lobe suspicious for chronic BOO or other atypical infection. 3. Subpleural left lower lobe spiculated opacity measuring 4 x 9 x 8 mm. Guidelines for Management of Incidental Pulmonary Nodules Detected on CT Images: from the Fleischner Society 2017 LOW-RISK PATIENTS: LESS THAN OR EQUAL TO 6 MM:  No routine follow-up needed. GREATER THAN 6-8 MM: CT at 6-12 months, if multiple at 3-6 months, then consider CT at 18-24 months. HIGH-RISK PATIENTS: LESS THAN OR EQUAL TO 6 MM:  Optional CT at 12 months. GREATER THAN 6-8 MM: CT at 6-12 months, if multiple at 3-6 months, then CT at 18-24 months. Guidelines for Management of Incidental Pulmonary Nodules Detected on CT : A Statement from the Fleischner Society 2017\"  Peace, H. , et.al. Radiology 2017; 000:1-16     XR CHEST PORTABLE    Result Date: 1/8/2024  No acute process on portable chest. Stable interstitial prominence.        We were asked to admit for work up and evaluation of the above problems.     Past Medical History:   Diagnosis Date    Arrhythmia     PALPITATIONS    Bronchitis      Comment NORMOCYTIC, NORMOCHROMIC     Comprehensive Metabolic Panel    Collection Time: 01/08/24  3:41 PM   Result Value Ref Range    Sodium 130 (L) 136 - 145 mmol/L    Potassium 4.1 3.5 - 5.1 mmol/L    Chloride 95 (L) 97 - 108 mmol/L    CO2 31 21 - 32 mmol/L    Anion Gap 4 (L) 5 - 15 mmol/L    Glucose 237 (H) 65 - 100 mg/dL    BUN 9 6 - 20 MG/DL    Creatinine 0.59 0.55 - 1.02 MG/DL    Bun/Cre Ratio 15 12 - 20      Est, Glom Filt Rate >60 >60 ml/min/1.73m2    Calcium 9.2 8.5 - 10.1 MG/DL    Total Bilirubin 0.6 0.2 - 1.0 MG/DL    ALT 15 12 - 78 U/L    AST 20 15 - 37 U/L    Alk Phosphatase 73 45 - 117 U/L    Total Protein 8.0 6.4 - 8.2 g/dL    Albumin 3.9 3.5 - 5.0 g/dL    Globulin 4.1 (H) 2.0 - 4.0 g/dL    Albumin/Globulin Ratio 1.0 (L) 1.1 - 2.2     Magnesium    Collection Time: 01/08/24  3:41 PM   Result Value Ref Range    Magnesium 1.5 (L) 1.6 - 2.4 mg/dL   Brain Natriuretic Peptide    Collection Time: 01/08/24  3:41 PM   Result Value Ref Range    NT Pro-BNP 1,305 (H) <450 PG/ML   Troponin    Collection Time: 01/08/24  3:41 PM   Result Value Ref Range    Troponin, High Sensitivity 37 0 - 51 ng/L   COVID-19, Rapid    Collection Time: 01/08/24  4:25 PM    Specimen: Nasopharyngeal   Result Value Ref Range    Source Nasopharyngeal      SARS-CoV-2, Rapid Not detected NOTD           CTA CHEST W WO CONTRAST    Result Date: 1/8/2024  EXAM:  CTA CHEST W WO CONTRAST INDICATION: Hypoxia with dry cough and interstitial prominence on chest x-ray. Evaluate for pulmonary embolus. COMPARISON: Chest x-ray just prior to this exam. TECHNIQUE: Helical thin section chest CT following intravenous administration of nonionic contrast 100 mL of isovue 370 according to departmental PE protocol. Coronal and sagittal reformats were performed. 3D post processing was performed.  CT dose reduction was achieved through the use of a standardized protocol tailored for this examination and automatic exposure control for dose modulation. FINDINGS:

## 2024-01-09 NOTE — PROGRESS NOTES
Hospitalist Progress Note    NAME:   Kamilla Lantigua   : 1935   MRN: [id number]     Date/Time: 2024 2:11 PM  Patient PCP: No primary care provider on file.    Estimated discharge date: 48 hrs pending clinical improvement    Assessment / Plan:  PNA, likely viral  Rule out BOO  Acute hypoxic respiratory failure due to the above  Influenza A/ parainfluenza and Covid19 positive   CTA chest: No pulmonary embolus.  Bronchiectasis and associated pericardial thickening and reticular nodular opacities in the lingula and right middle lobe suspicious for chronic BOO or  other atypical infection.  Subpleural left lower lobe spiculated opacity measuring 4 x 9 x 8 mm.  Start tamiflu  Start decadron.  Consult pharmacy to eval for baricitinib  Continue empiric IV abx  Nebs prn  Wean O2 as tolerated  Pulm consulted  Will need repeat imaging outpt per protocol     Lower extremity edema  LE duplex US neg for DVT  Echo is pending     Atrial fibrillation not on anticoagulation  Cont' digoxin     Type 2 diabetes  Hold oral agents and start insulin/scale     Hospital induced delirium reported by son  Worst due to acute illness.    Avoid benzodiazepine/H2 blockers  Limit stimulation  Monitor closely for delirium               Medical Decision Making:   I personally reviewed labs  I personally reviewed imaging  Toxic drug monitoring  I personally reviewed EKG  Discussed case with: RNGERARD, discussed plan of care and dispo during interdisciplinary round        Code Status: full  DVT Prophylaxis: lovenox    Subjective:       Confused.  Somnolent.    Discussed with RN events overnight.       Objective:     VITALS:   Last 24hrs VS reviewed since prior progress note. Most recent are:  Patient Vitals for the past 24 hrs:   BP Temp Temp src Pulse Resp SpO2   24 1245 -- -- -- -- 24 --   24 0811 (!) 154/68 100 °F (37.8 °C) -- 93 -- 93 %   24 0604 (!) 173/68 -- -- 94 -- 96 %   24 0410 (!) 169/71 -- -- 92 --

## 2024-01-09 NOTE — PROGRESS NOTES
Received called from Lab ( [facility] )   Test -Rp panel done yesterday   Result - Covid positive.   Notified to attending doctor Maylin UGARTE MD

## 2024-01-09 NOTE — PROGRESS NOTES
Physical Therapy    Chart reviewed and discussed with RN, patient is currently running a fever, oriented only to 1, confused and lethargic and not following commands.  Plan to defer today and will continue to follow for PT eval.    LATISHA HadleyT

## 2024-01-09 NOTE — PLAN OF CARE
Problem: Discharge Planning  Goal: Discharge to home or other facility with appropriate resources  Outcome: Progressing     Problem: Chronic Conditions and Co-morbidities  Goal: Patient's chronic conditions and co-morbidity symptoms are monitored and maintained or improved  Outcome: Progressing

## 2024-01-09 NOTE — PROGRESS NOTES
Physician Progress Note      PATIENT:               LEONILA ESCOBAR  CSN #:                  [id number]  :                       1935  ADMIT DATE:       2024 3:01 PM  DISCH DATE:  RESPONDING  PROVIDER #:        Aliya Carlisle MD          QUERY TEXT:    Pt admitted, noted to have Community Acquired Pneumonia. If possible, please   document in the progress notes and discharge summary if you are evaluating   and/or treating any of the following:      Note: CAP and HCAP indicate where the pneumonia was acquired, not a specific   type.      The medical record reflects the following:  Risk Factors:  Clinical Indicators:  H&P: Community-acquired pneumonia, treated with ABX:  Rocephin IV, doxycycline   IV, Antiviral: Tamiflu PO    24 04:02 Influenza A H1 () PCR: Detected !    24 04:02 Parainfluenza 3 PCR: Detected !    24 04:02 SARS-CoV-2, PCR: Detected !    Treatment:  Pulmonology consult  Empiric antibiotics  Follow-up cultures  Check procalcitonin  Check respiratory viral panel  Check respiratory cultures  Wean off oxygen  Pulm consult  Repeat CAT scan as outpatient as per protocol  Options provided:  -- Gram positive pneumonia  -- Viral pneumonia due to Covid and Flu  -- Other - I will add my own diagnosis  -- Disagree - Not applicable / Not valid  -- Disagree - Clinically unable to determine / Unknown  -- Refer to Clinical Documentation Reviewer    PROVIDER RESPONSE TEXT:    This patient has viral pneumonia due to Covid and Flu.    Query created by: Rilee, Cheryle on 2024 2:32 PM      Electronically signed by:  Aliya Carlisle MD 2024 4:29 PM

## 2024-01-09 NOTE — ED PROVIDER NOTES
[facility] EMERGENCY DEPT  EMERGENCY DEPARTMENT ENCOUNTER       Pt Name: Kamilla Lantigua  MRN: [id number]  Birthdate 1935  Date of evaluation: 1/8/2024  Provider: Juan Henry MD   PCP: No primary care provider on file.  Note Started: 7:54 PM 1/8/24     CHIEF COMPLAINT       Chief Complaint   Patient presents with    Shortness of Breath     Pt arrives via EMS from [facility], presumed flu case, pt with new 4L NC O2 requirement     HISTORY OF PRESENT ILLNESS: 1 or more elements      History From: Patient, EMS, History limited by: None     Kamilla Lantigua is a 88 y.o. female who presents with myalgias, chills, cough for the past few days.  Similar symptoms to prior flu infection.  [facility] where she was found to be hypoxic, started on 4 L nasal cannula.  Lab workup was unremarkable including CBC CMP, her flu test were negative, COVID test was negative.    Nursing Notes were all reviewed and agreed with or any disagreements were addressed in the HPI.     REVIEW OF SYSTEMS        Positives and Pertinent negatives as per HPI.    PAST HISTORY     Past Medical History:  Past Medical History:   Diagnosis Date    Arrhythmia     PALPITATIONS    Bronchitis     Chronic pain     RT. SHOULDER    Diabetes (HCC)     TYPE II    History of blood transfusion 1960'S    AYAKA DE LEON, NO REACTION       Past Surgical History:  Past Surgical History:   Procedure Laterality Date    DILATION AND CURETTAGE OF UTERUS  1960'S    HIP SURGERY Right 8/22/2023    HIP HEMIARTHROPLASTY performed by Darren Davis DO at [facility] MA[facility] OR    HYSTERECTOMY (CERVIX STATUS UNKNOWN)  1984    TONSILLECTOMY  1956    UROLOGICAL SURGERY  2009    [facility]       Family History:  Family History   Problem Relation Age of Onset    Cancer Mother         UTERINE    Heart Disease Father        Social History:  Social History     Tobacco Use    Smoking status: Never    Smokeless tobacco: Never   Substance Use Topics    Alcohol use: No    Drug    Pulse: 88 88 85   Resp: 24  22   Temp: 99.9 °F (37.7 °C)     SpO2: 90% 96% 97%        Patient was given the following medications:  Medications   cefTRIAXone (ROCEPHIN) 1,000 mg in sodium chloride 0.9 % 50 mL IVPB (mini-bag) (has no administration in time range)   iopamidol (ISOVUE-370) 76 % injection 100 mL (100 mLs IntraVENous Given 1/8/24 1813)       Medical Decision Making  88-year-old presenting with cough, chills, myalgias    She is well-appearing, borderline febrile on arrival, tachypnea: Oxygen.  Will obtain chest x-ray flu COVID test CBC CMP.    Amount and/or Complexity of Data Reviewed  Labs: ordered. Decision-making details documented in ED Course.  Radiology: ordered and independent interpretation performed. Decision-making details documented in ED Course.  ECG/medicine tests: ordered and independent interpretation performed. Decision-making details documented in ED Course.    Risk  Prescription drug management.  Decision regarding hospitalization.      ED Course as of 01/08/24 1954 Mon Jan 08, 2024   1600 X-ray of the chest shows stable interstitial prominence, plan CTA chest [WB]   1738 COVID is negative [WB]   1940 Patient saturating 96% on room air [WB]   1940 CT shows atypical infection in RML with a spiculated mass requiring follow up on left [WB]   1946 Will treat atypicals with azithromycin, patient remains tachypneic on 2L NC, will plan admission to the hospital [WB]   1953 Afebrile normal episode, tachycardic, patient is not septic, will cover with ceftriaxone, reported psychosis from clarithromycin will avoid azithromycin [WB]      ED Course User Index  [WB] Juan Henry MD     FINAL IMPRESSION     1. Pneumonia of right middle lobe due to infectious organism       DISPOSITION/PLAN   Kamilla HANNA Lantigua's  results have been reviewed with her.  She has been counseled regarding her diagnosis, treatment, and plan.  She verbally conveys understanding and agreement of the signs, symptoms,

## 2024-01-10 LAB
ANION GAP SERPL CALC-SCNC: 5 MMOL/L (ref 5–15)
BASOPHILS # BLD: 0 K/UL (ref 0–0.1)
BASOPHILS NFR BLD: 0 % (ref 0–1)
BUN SERPL-MCNC: 23 MG/DL (ref 6–20)
BUN/CREAT SERPL: 23 (ref 12–20)
CALCIUM SERPL-MCNC: 9.8 MG/DL (ref 8.5–10.1)
CHLORIDE SERPL-SCNC: 92 MMOL/L (ref 97–108)
CO2 SERPL-SCNC: 34 MMOL/L (ref 21–32)
CREAT SERPL-MCNC: 1.01 MG/DL (ref 0.55–1.02)
DIFFERENTIAL METHOD BLD: ABNORMAL
EOSINOPHIL # BLD: 0 K/UL (ref 0–0.4)
EOSINOPHIL NFR BLD: 0 % (ref 0–7)
ERYTHROCYTE [DISTWIDTH] IN BLOOD BY AUTOMATED COUNT: 15.1 % (ref 11.5–14.5)
FLUID CULTURE: NORMAL
GLUCOSE BLD STRIP.AUTO-MCNC: 233 MG/DL (ref 65–117)
GLUCOSE BLD STRIP.AUTO-MCNC: 265 MG/DL (ref 65–117)
GLUCOSE BLD STRIP.AUTO-MCNC: 274 MG/DL (ref 65–117)
GLUCOSE BLD STRIP.AUTO-MCNC: 290 MG/DL (ref 65–117)
GLUCOSE SERPL-MCNC: 319 MG/DL (ref 65–100)
HCT VFR BLD AUTO: 42.3 % (ref 35–47)
HGB BLD-MCNC: 13.5 G/DL (ref 11.5–16)
IMM GRANULOCYTES # BLD AUTO: 0 K/UL (ref 0–0.04)
IMM GRANULOCYTES NFR BLD AUTO: 0 % (ref 0–0.5)
L PNEUMO1 AG UR QL IA: NEGATIVE
LYMPHOCYTES # BLD: 0.6 K/UL (ref 0.8–3.5)
LYMPHOCYTES NFR BLD: 22 % (ref 12–49)
Lab: NORMAL
MCH RBC QN AUTO: 27.7 PG (ref 26–34)
MCHC RBC AUTO-ENTMCNC: 31.9 G/DL (ref 30–36.5)
MCV RBC AUTO: 86.9 FL (ref 80–99)
MONOCYTES # BLD: 0 K/UL (ref 0–1)
MONOCYTES NFR BLD: 0 % (ref 5–13)
NEUTS BAND NFR BLD MANUAL: 2 %
NEUTS SEG # BLD: 2 K/UL (ref 1.8–8)
NEUTS SEG NFR BLD: 76 % (ref 32–75)
NRBC # BLD: 0 K/UL (ref 0–0.01)
NRBC BLD-RTO: 0 PER 100 WBC
ORGANISM ID: NORMAL
PLATELET # BLD AUTO: 207 K/UL (ref 150–400)
PMV BLD AUTO: 9 FL (ref 8.9–12.9)
POTASSIUM SERPL-SCNC: 4.6 MMOL/L (ref 3.5–5.1)
RBC # BLD AUTO: 4.87 M/UL (ref 3.8–5.2)
RBC MORPH BLD: ABNORMAL
S PNEUM AG SPEC QL LA: NEGATIVE
SERVICE CMNT-IMP: ABNORMAL
SODIUM SERPL-SCNC: 131 MMOL/L (ref 136–145)
SPECIMEN SOURCE: NORMAL
SPECIMEN SOURCE: NORMAL
SPECIMEN: NORMAL
TOTAL CELLS COUNTED SPEC: 50
WBC # BLD AUTO: 2.6 K/UL (ref 3.6–11)

## 2024-01-10 PROCEDURE — 1100000003 HC PRIVATE W/ TELEMETRY

## 2024-01-10 PROCEDURE — XW0DXM6 INTRODUCTION OF BARICITINIB INTO MOUTH AND PHARYNX, EXTERNAL APPROACH, NEW TECHNOLOGY GROUP 6: ICD-10-PCS | Performed by: INTERNAL MEDICINE

## 2024-01-10 PROCEDURE — 80048 BASIC METABOLIC PNL TOTAL CA: CPT

## 2024-01-10 PROCEDURE — 36415 COLL VENOUS BLD VENIPUNCTURE: CPT

## 2024-01-10 PROCEDURE — 94760 N-INVAS EAR/PLS OXIMETRY 1: CPT

## 2024-01-10 PROCEDURE — 85025 COMPLETE CBC W/AUTO DIFF WBC: CPT

## 2024-01-10 PROCEDURE — 6370000000 HC RX 637 (ALT 250 FOR IP): Performed by: PHYSICIAN ASSISTANT

## 2024-01-10 PROCEDURE — 94761 N-INVAS EAR/PLS OXIMETRY MLT: CPT

## 2024-01-10 PROCEDURE — 97535 SELF CARE MNGMENT TRAINING: CPT

## 2024-01-10 PROCEDURE — 94640 AIRWAY INHALATION TREATMENT: CPT

## 2024-01-10 PROCEDURE — 6370000000 HC RX 637 (ALT 250 FOR IP): Performed by: INTERNAL MEDICINE

## 2024-01-10 PROCEDURE — 97165 OT EVAL LOW COMPLEX 30 MIN: CPT

## 2024-01-10 PROCEDURE — 6360000002 HC RX W HCPCS: Performed by: GENERAL ACUTE CARE HOSPITAL

## 2024-01-10 PROCEDURE — 6370000000 HC RX 637 (ALT 250 FOR IP): Performed by: GENERAL ACUTE CARE HOSPITAL

## 2024-01-10 PROCEDURE — 82962 GLUCOSE BLOOD TEST: CPT

## 2024-01-10 PROCEDURE — 2700000000 HC OXYGEN THERAPY PER DAY

## 2024-01-10 PROCEDURE — 6360000002 HC RX W HCPCS: Performed by: INTERNAL MEDICINE

## 2024-01-10 PROCEDURE — 97161 PT EVAL LOW COMPLEX 20 MIN: CPT

## 2024-01-10 PROCEDURE — 2580000003 HC RX 258: Performed by: GENERAL ACUTE CARE HOSPITAL

## 2024-01-10 PROCEDURE — 2500000003 HC RX 250 WO HCPCS: Performed by: GENERAL ACUTE CARE HOSPITAL

## 2024-01-10 PROCEDURE — 97116 GAIT TRAINING THERAPY: CPT

## 2024-01-10 RX ORDER — GLIPIZIDE 5 MG/1
2.5 TABLET ORAL
Status: DISCONTINUED | OUTPATIENT
Start: 2024-01-10 | End: 2024-01-12 | Stop reason: HOSPADM

## 2024-01-10 RX ADMIN — CEFTRIAXONE SODIUM 1000 MG: 1 INJECTION, POWDER, FOR SOLUTION INTRAMUSCULAR; INTRAVENOUS at 22:08

## 2024-01-10 RX ADMIN — BARICITINIB 2 MG: 2 TABLET, FILM COATED ORAL at 09:31

## 2024-01-10 RX ADMIN — DEXAMETHASONE 6 MG: 4 TABLET ORAL at 09:31

## 2024-01-10 RX ADMIN — SODIUM CHLORIDE, PRESERVATIVE FREE 10 ML: 5 INJECTION INTRAVENOUS at 22:00

## 2024-01-10 RX ADMIN — GUAIFENESIN 600 MG: 600 TABLET, EXTENDED RELEASE ORAL at 21:59

## 2024-01-10 RX ADMIN — DOXYCYCLINE 100 MG: 100 INJECTION, POWDER, LYOPHILIZED, FOR SOLUTION INTRAVENOUS at 22:43

## 2024-01-10 RX ADMIN — GUAIFENESIN 600 MG: 600 TABLET, EXTENDED RELEASE ORAL at 09:32

## 2024-01-10 RX ADMIN — IPRATROPIUM BROMIDE AND ALBUTEROL SULFATE 1 DOSE: 2.5; .5 SOLUTION RESPIRATORY (INHALATION) at 11:55

## 2024-01-10 RX ADMIN — OSELTAMIVIR PHOSPHATE 75 MG: 75 CAPSULE ORAL at 09:31

## 2024-01-10 RX ADMIN — IPRATROPIUM BROMIDE AND ALBUTEROL SULFATE 1 DOSE: 2.5; .5 SOLUTION RESPIRATORY (INHALATION) at 14:47

## 2024-01-10 RX ADMIN — INSULIN LISPRO 4 UNITS: 100 INJECTION, SOLUTION INTRAVENOUS; SUBCUTANEOUS at 09:31

## 2024-01-10 RX ADMIN — DIGOXIN 0.12 MG: 125 TABLET ORAL at 09:31

## 2024-01-10 RX ADMIN — INSULIN LISPRO 4 UNITS: 100 INJECTION, SOLUTION INTRAVENOUS; SUBCUTANEOUS at 13:04

## 2024-01-10 RX ADMIN — DOXYCYCLINE 100 MG: 100 INJECTION, POWDER, LYOPHILIZED, FOR SOLUTION INTRAVENOUS at 11:30

## 2024-01-10 RX ADMIN — SODIUM CHLORIDE: 9 INJECTION, SOLUTION INTRAVENOUS at 11:29

## 2024-01-10 RX ADMIN — INSULIN LISPRO 2 UNITS: 100 INJECTION, SOLUTION INTRAVENOUS; SUBCUTANEOUS at 17:49

## 2024-01-10 RX ADMIN — IPRATROPIUM BROMIDE AND ALBUTEROL SULFATE 1 DOSE: 2.5; .5 SOLUTION RESPIRATORY (INHALATION) at 19:46

## 2024-01-10 RX ADMIN — SODIUM CHLORIDE, PRESERVATIVE FREE 10 ML: 5 INJECTION INTRAVENOUS at 09:41

## 2024-01-10 RX ADMIN — GLIPIZIDE 2.5 MG: 5 TABLET ORAL at 09:57

## 2024-01-10 RX ADMIN — OSELTAMIVIR PHOSPHATE 75 MG: 75 CAPSULE ORAL at 21:59

## 2024-01-10 RX ADMIN — ENOXAPARIN SODIUM 40 MG: 100 INJECTION SUBCUTANEOUS at 09:31

## 2024-01-10 ASSESSMENT — PAIN SCALES - GENERAL: PAINLEVEL_OUTOF10: 0

## 2024-01-10 NOTE — PROGRESS NOTES
Pharmacist COVID-19 Therapy Consult    Consult reason:   Baricitinib criteria for use evaluation for utilization in COVID-19    Plan:   Patient meets criteria for use and medication will be ordered  COVID + and requiring 2L supplemental O2 with elevated CRP     Based on eGFR 54, will order baricitinib 2mg daily     Thank you,  Edie Yin RPH

## 2024-01-10 NOTE — PROGRESS NOTES
Bedside and Verbal shift change report given to Nahum RN (oncoming nurse) by PARIS Higgins RN (offgoing nurse).  Report given with SBAR, Kardex, Intake/Output, MAR and Recent Results.

## 2024-01-10 NOTE — PROGRESS NOTES
Hospitalist Progress Note    NAME:   Kamilla Lantigua   : 1935   MRN: [id number]     Date/Time: 1/10/2024 9:09 AM  Patient PCP: No primary care provider on file.    Estimated discharge date: 48 hrs pending clinical improvement    Assessment / Plan:  PNA, likely viral  Rule out BOO  Acute hypoxic respiratory failure due to the above  Influenza A/ parainfluenza and Covid19 positive   CTA chest: No pulmonary embolus.  Bronchiectasis and associated pericardial thickening and reticular nodular opacities in the lingula and right middle lobe suspicious for chronic BOO or  other atypical infection.  Subpleural left lower lobe spiculated opacity measuring 4 x 9 x 8 mm.  Continue Tamiflu  Per son, pt tested positive for Covid and influenza 3 weeks ago  Continue decadron given hypoxia  Consult pharmacy to eval for baricitinib  AFB and sputum culture is ordered  Nebs prn  Wean O2 as tolerated.  Currently on 2LNC with SpO2 98%.  Pulm evaluated, note is reviewed.  Appreciate recs.  Will need repeat imaging outpt per protocol     Hyponatremia  Likely due to SIADH, will fluid restrict  Repeat BMP in the AM    Lower extremity edema  LE duplex US neg for DVT  Echo is pending     Atrial fibrillation not on anticoagulation  Cont' digoxin     Type 2 diabetes  Hyperglycemic due to steroid use  Will resume glipizide  SSI    Hospital induced delirium reported by son  Worst due to acute illness.  Mentation improved yesterday afternoon  Limit stimulation  Monitor closely for delirium       Updated pt's son and  via phone.          Medical Decision Making:   I personally reviewed labs  I personally reviewed imaging  Toxic drug monitoring  I personally reviewed EKG  Discussed case with: RNGERARD, discussed plan of care and dispo during interdisciplinary round        Code Status: full  DVT Prophylaxis: lovenox    Subjective:   Awake, cooperative.  Mentation improved from previous day.  Intermittent confusion

## 2024-01-10 NOTE — PROGRESS NOTES
OCCUPATIONAL THERAPY EVALUATION/DISCHARGE  Patient: Kamilla Lantigua (88 y.o. female)  Date: 1/10/2024  Primary Diagnosis: Atypical pneumonia [J18.9]  Bilateral leg edema [R60.0]  Pneumonia of right middle lobe due to infectious organism [J18.9]  Hypoxia [R09.02]         Precautions: Fall Risk                  ASSESSMENT :  Based on the objective data below, the patient is near her independent baseline, currently completing ADLs at a n indeendent to supervision level and transfers up to CGA level using the RW. Pt demonstrated impaired standing balance with a slight LOB while completing grooming at the sink. Unable to identify errors made, and increased processing time. Verbal and tactile cues require to manage RW during transfers, continued education required. Pt does not need acute OT at this time, however will benefit from HHOT upon discharge.     Functional Outcome Measure:  The patient scored 85/100 on the Barthel Index outcome measure which is indicative of Independent.      Further skilled acute occupational therapy is not indicated at this time.     PLAN :  Recommend with staff: OOB for meals    Recommendation for discharge: (in order for the patient to meet his/her long term goals): Therapy 2 days/week in the home and also see \"other factors to consider\" below for additional discharge concerns/needs    Other factors to consider for discharge: poor safety awareness, impaired cognition, and high risk for falls    IF patient discharges home will need the following DME: patient owns DME required for discharge     SUBJECTIVE:   Patient stated, “I am hard of hearing.”    OBJECTIVE DATA SUMMARY:     Past Medical History:   Diagnosis Date    Arrhythmia     PALPITATIONS    Bronchitis     Chronic pain     RT. SHOULDER    Diabetes (HCC)     TYPE II    History of blood transfusion 1960'S    AYAKA DE LEON, NO REACTION     Past Surgical History:   Procedure Laterality Date    DILATION AND CURETTAGE OF UTERUS  1960'S     HIP SURGERY Right 8/22/2023    HIP HEMIARTHROPLASTY performed by Darren Davis DO at [facility] MAIN OR    HYSTERECTOMY (CERVIX STATUS UNKNOWN)  1984    TONSILLECTOMY  1956    UROLOGICAL SURGERY  2009    BLADDER TUCK       Prior Level of Function/Environment/Context:  Receives Help From: Family, ADL Assistance: Independent,  ,  ,  ,  ,  , Homemaking Assistance: Independent, Ambulation Assistance: Independent, Transfer Assistance: Independent, Active : Yes     Expanded or extensive additional review of patient history:   Lives With: Spouse, Family  Type of Home: House  Home Layout: One level  Home Access: Ramped entrance, Stairs to enter with rails        Bathroom Shower/Tub: Walk-in shower  Bathroom Toilet: Handicap height  Bathroom Equipment: Grab bars in shower, Shower chair, 3-in-1 commode     Home Equipment: Cane, Walker, rolling, Wheelchair-manual, Rollator  Has the patient had two or more falls in the past year or any fall with injury in the past year?: No      Hand Dominance: right     EXAMINATION OF PERFORMANCE DEFICITS:    Cognitive/Behavioral Status:    Orientation  Overall Orientation Status: Within Functional Limits  Orientation Level: Oriented X4  Cognition  Overall Cognitive Status: Exceptions  Arousal/Alertness: Appropriate responses to stimuli  Following Commands: Follows one step commands with increased time;Follows one step commands with repetition  Attention Span: Attends with cues to redirect;Difficulty attending to directions;Difficulty dividing attention  Memory: Decreased short term memory  Safety Judgement: Decreased awareness of need for assistance;Decreased awareness of need for safety  Problem Solving: Assistance required to correct errors made;Decreased awareness of errors;Assistance required to generate solutions;Assistance required to implement solutions;Assistance required to identify errors made  Insights: Decreased awareness of deficits  Initiation: Requires cues for

## 2024-01-10 NOTE — PROGRESS NOTES
.Bedside, Verbal, Recorded, and Written shift change report given to Stacy RN (oncoming nurse) by Mindy rn (offgoing nurse). Report included the following information Nurse Handoff Report, ED SBAR, Adult Overview, Intake/Output, MAR, Recent Results, Cardiac Rhythm Sinus rhythym, and Neuro Assessment.

## 2024-01-10 NOTE — CARE COORDINATION
Care Management Initial Assessment       RUR: 15% \"low risk\"  Readmission? No  1st IM letter given? No  1st  letter given: N/A    Initial note - 1606 PM: Chart reviewed. CM met with pt daughter-in-law via telephone encounter to introduce self and role. CM called pt spouse phone number to which the pt daughter-in-law answered the phone. Verified contact information and demographics. Pt resides with her spouse, son, and daughter-in-law in a multi level home with a ramped entrance. Pt performs ADL's on the first level of the home. Preferred pharmacy is [facility] on [facility]. Pt has a hx of HH services with [facility]. Pt has a hx of a SNF stay, but pt daughter-in-law was unable to recall the name of the facility. Pt is independent with ADL's and has no DME needs as the pt uses a cane at home to ambulate with. Pt is not an active . Pt has no ACP on file. Pt family able to transport pt home upon time of d/c. Full assessment below:     01/10/24 1606   Service Assessment   Patient Orientation Alert and Oriented;Person;Place   Cognition Alert  (Pt mentation has improved. Intermittent confusion happened overnight.)   History Provided By Child/Family  (Pt daughter in law)   Primary Caregiver Self   Support Systems Children;Family Members   Patient's Healthcare Decision Maker is: Legal Next of Kin   PCP Verified by CM Yes  (Pt goes to [facility] currently, pt in need of new PCP.)   Last Visit to PCP Within last 6 months   Prior Functional Level Independent in ADLs/IADLs   Current Functional Level Independent in ADLs/IADLs   Can patient return to prior living arrangement Yes   Ability to make needs known: Fair   Family able to assist with home care needs: Yes   Would you like for me to discuss the discharge plan with any other family members/significant others, and if so, who? Yes  (Barry Lantigua (spouse))   Financial Resources Medicare  (Primary: Medicare Part A and B, Secondary: [facility])    Community Resources None   Social/Functional History   Lives With Spouse;Family;Son   Type of Home House   Home Layout Multi-level;Performs ADL's on one level   Home Access Ramped entrance   Home Equipment Cane   Receives Help From Family   ADL Assistance Independent   Homemaking Assistance Independent   Ambulation Assistance Independent   Transfer Assistance Independent   Active  No   Occupation Retired   Discharge Planning   Type of Residence House   Living Arrangements Children;Spouse/Significant Other;Family Members   Current Services Prior To Admission None   Potential Assistance Needed N/A   DME Ordered? No   Potential Assistance Purchasing Medications No   Type of Home Care Services None   Patient expects to be discharged to: House     Advance Care Planning   No ACP discussion was held at time of initial assessment.    Dulce Francisco   [phone]

## 2024-01-10 NOTE — PLAN OF CARE
Problem: Physical Therapy - Adult  Goal: By Discharge: Performs mobility at highest level of function for planned discharge setting.  See evaluation for individualized goals.  Description: FUNCTIONAL STATUS PRIOR TO ADMISSION: Patient was modified independent using a rolling walker for functional mobility, mostly household distances.  Had fall in August with hip fracture.     HOME SUPPORT PRIOR TO ADMISSION: The patient lived with spouse but did not require assistance.  Lives in in-law suite attached to son's home.     Physical Therapy Goals  Initiated 1/10/2024  1.  Patient will move from supine to sit and sit to supine in bed with independence within 7 day(s).    2.  Patient will perform sit to stand with supervision/set-up within 7 day(s).  3.  Patient will transfer from bed to chair and chair to bed with supervision/set-up using the least restrictive device within 7 day(s).  4.  Patient will ambulate with supervision/set-up for 100 feet with the least restrictive device within 7 day(s).   Outcome: Not Progressing   PHYSICAL THERAPY EVALUATION    Patient: Kamilla Lantigua (88 y.o. female)  Date: 1/10/2024  Primary Diagnosis: Atypical pneumonia [J18.9]  Bilateral leg edema [R60.0]  Pneumonia of right middle lobe due to infectious organism [J18.9]  Hypoxia [R09.02]       Precautions: Fall Risk                      ASSESSMENT :   DEFICITS/IMPAIRMENTS:   The patient is limited by decreased functional mobility, strength, activity tolerance, safety awareness, attention/concentration, balance.  Patient received in bed and agreeable to participate, is able to sit EOB with SBA and sitting balance is intact.  Patient ambulated x approx 30 feet with RW, gait with mild unsteadiness and requires min assist to CGA for safety and walker management.  Mobilized on 2 liters 02 with sats in mid 90s.  Left supine with bed with call bell in reach.     Based on the impairments listed above patient is below baseline but has good  Training  Bed Mobility Training: Yes  Supine to Sit: Stand-by assistance  Sit to Supine: Stand-by assistance  Scooting: Independent  Transfers:     Transfer Training  Transfer Training: Yes  Overall Level of Assistance: Contact-guard assistance  Sit to Stand: Contact-guard assistance  Stand to Sit: Contact-guard assistance  Toilet Transfer: Contact-guard assistance  Balance:               Balance  Sitting: Intact  Standing: Impaired  Standing - Static: Fair;Constant support  Standing - Dynamic: Fair;Constant support  Ambulation/Gait Training:                       Gait  Overall Level of Assistance: Contact-guard assistance;Minimum assistance  Distance (ft): 30 Feet  Assistive Device: Gait belt;Walker, rolling  Base of Support: Narrowed  Speed/Daisy: Pace decreased (< 100 feet/min)  Step Length: Right shortened;Left shortened  Gait Abnormalities: Decreased step clearance                                                                                                                                                                                                                                                          [facility] AM-PAC®      Basic Mobility Inpatient Short Form (6-Clicks) Version 2  How much HELP from another person do you currently need... (If the patient hasn't done an activity recently, how much help from another person do you think they would need if they tried?) Total A Lot A Little None   1.  Turning from your back to your side while in a flat bed without using bedrails? []  1 []  2 []  3  [x]  4   2.  Moving from lying on your back to sitting on the side of a flat bed without using bedrails? []  1 []  2 []  3  [x]  4   3.  Moving to and from a bed to a chair (including a wheelchair)? []  1 []  2 [x]  3  []  4   4. Standing up from a chair using your arms (e.g. wheelchair or bedside chair)? []  1 []  2 [x]  3  []  4   5.  Walking in hospital room? []  1 []  2 [x]  3  []  4   6.  Climbing

## 2024-01-10 NOTE — PLAN OF CARE
Problem: Discharge Planning  Goal: Discharge to home or other facility with appropriate resources  Outcome: Progressing     Problem: Chronic Conditions and Co-morbidities  Goal: Patient's chronic conditions and co-morbidity symptoms are monitored and maintained or improved  Outcome: Progressing     Problem: Safety - Adult  Goal: Free from fall injury  Outcome: Progressing     Problem: Skin/Tissue Integrity  Goal: Absence of new skin breakdown  Description: 1.  Monitor for areas of redness and/or skin breakdown  2.  Assess vascular access sites hourly  3.  Every 4-6 hours minimum:  Change oxygen saturation probe site  4.  Every 4-6 hours:  If on nasal continuous positive airway pressure, respiratory therapy assess nares and determine need for appliance change or resting period.  Outcome: Progressing

## 2024-01-11 ENCOUNTER — APPOINTMENT (OUTPATIENT)
Facility: HOSPITAL | Age: 89
End: 2024-01-11
Attending: GENERAL ACUTE CARE HOSPITAL
Payer: MEDICARE

## 2024-01-11 LAB
ANION GAP SERPL CALC-SCNC: 3 MMOL/L (ref 5–15)
BASOPHILS # BLD: 0 K/UL (ref 0–0.1)
BASOPHILS NFR BLD: 0 % (ref 0–1)
BUN SERPL-MCNC: 36 MG/DL (ref 6–20)
BUN/CREAT SERPL: 43 (ref 12–20)
CALCIUM SERPL-MCNC: 9 MG/DL (ref 8.5–10.1)
CHLORIDE SERPL-SCNC: 97 MMOL/L (ref 97–108)
CO2 SERPL-SCNC: 33 MMOL/L (ref 21–32)
CREAT SERPL-MCNC: 0.84 MG/DL (ref 0.55–1.02)
DIFFERENTIAL METHOD BLD: ABNORMAL
ECHO AO ASC DIAM: 3.1 CM
ECHO AO ASCENDING AORTA INDEX: 1.85 CM/M2
ECHO AV AREA PEAK VELOCITY: 1.4 CM2
ECHO AV AREA VTI: 1.5 CM2
ECHO AV AREA/BSA PEAK VELOCITY: 0.8 CM2/M2
ECHO AV AREA/BSA VTI: 0.9 CM2/M2
ECHO AV MEAN GRADIENT: 7 MMHG
ECHO AV MEAN VELOCITY: 1.3 M/S
ECHO AV PEAK GRADIENT: 12 MMHG
ECHO AV PEAK VELOCITY: 1.7 M/S
ECHO AV VELOCITY RATIO: 0.59
ECHO AV VTI: 37.1 CM
ECHO BSA: 1.69 M2
ECHO LA DIAMETER INDEX: 2.14 CM/M2
ECHO LA DIAMETER: 3.6 CM
ECHO LA VOL A-L A2C: 38 ML (ref 22–52)
ECHO LA VOL A-L A4C: 50 ML (ref 22–52)
ECHO LA VOL MOD A2C: 36 ML (ref 22–52)
ECHO LA VOL MOD A4C: 46 ML (ref 22–52)
ECHO LA VOLUME AREA LENGTH: 44 ML
ECHO LA VOLUME INDEX A-L A2C: 23 ML/M2 (ref 16–34)
ECHO LA VOLUME INDEX A-L A4C: 30 ML/M2 (ref 16–34)
ECHO LA VOLUME INDEX AREA LENGTH: 26 ML/M2 (ref 16–34)
ECHO LA VOLUME INDEX MOD A2C: 21 ML/M2 (ref 16–34)
ECHO LA VOLUME INDEX MOD A4C: 27 ML/M2 (ref 16–34)
ECHO LV E' LATERAL VELOCITY: 8 CM/S
ECHO LV E' SEPTAL VELOCITY: 5 CM/S
ECHO LV EDV A4C: 51 ML
ECHO LV EDV INDEX A4C: 30 ML/M2
ECHO LV EJECTION FRACTION A4C: 67 %
ECHO LV ESV A4C: 17 ML
ECHO LV ESV INDEX A4C: 10 ML/M2
ECHO LV FRACTIONAL SHORTENING: 33 % (ref 28–44)
ECHO LV INTERNAL DIMENSION DIASTOLE INDEX: 2.56 CM/M2
ECHO LV INTERNAL DIMENSION DIASTOLIC: 4.3 CM (ref 3.9–5.3)
ECHO LV INTERNAL DIMENSION SYSTOLIC INDEX: 1.73 CM/M2
ECHO LV INTERNAL DIMENSION SYSTOLIC: 2.9 CM
ECHO LV IVSD: 1 CM (ref 0.6–0.9)
ECHO LV MASS 2D: 142.5 G (ref 67–162)
ECHO LV MASS INDEX 2D: 84.8 G/M2 (ref 43–95)
ECHO LV POSTERIOR WALL DIASTOLIC: 1 CM (ref 0.6–0.9)
ECHO LV RELATIVE WALL THICKNESS RATIO: 0.47
ECHO LVOT AREA: 2.5 CM2
ECHO LVOT AV VTI INDEX: 0.59
ECHO LVOT DIAM: 1.8 CM
ECHO LVOT MEAN GRADIENT: 2 MMHG
ECHO LVOT PEAK GRADIENT: 4 MMHG
ECHO LVOT PEAK VELOCITY: 1 M/S
ECHO LVOT STROKE VOLUME INDEX: 33.3 ML/M2
ECHO LVOT SV: 56 ML
ECHO LVOT VTI: 22 CM
ECHO MV A VELOCITY: 1.41 M/S
ECHO MV AREA VTI: 1.4 CM2
ECHO MV E DECELERATION TIME (DT): 379.8 MS
ECHO MV E VELOCITY: 1.17 M/S
ECHO MV E/A RATIO: 0.83
ECHO MV E/E' LATERAL: 14.63
ECHO MV E/E' RATIO (AVERAGED): 19.01
ECHO MV LVOT VTI INDEX: 1.81
ECHO MV MAX VELOCITY: 1.5 M/S
ECHO MV MEAN GRADIENT: 4 MMHG
ECHO MV MEAN VELOCITY: 0.9 M/S
ECHO MV PEAK GRADIENT: 9 MMHG
ECHO MV VTI: 39.8 CM
ECHO RV FREE WALL PEAK S': 16 CM/S
ECHO RV TAPSE: 2.5 CM (ref 1.7–?)
ECHO TV REGURGITANT MAX VELOCITY: 2.39 M/S
ECHO TV REGURGITANT PEAK GRADIENT: 23 MMHG
EOSINOPHIL # BLD: 0 K/UL (ref 0–0.4)
EOSINOPHIL NFR BLD: 0 % (ref 0–7)
ERYTHROCYTE [DISTWIDTH] IN BLOOD BY AUTOMATED COUNT: 14.8 % (ref 11.5–14.5)
GLUCOSE BLD STRIP.AUTO-MCNC: 137 MG/DL (ref 65–117)
GLUCOSE BLD STRIP.AUTO-MCNC: 367 MG/DL (ref 65–117)
GLUCOSE BLD STRIP.AUTO-MCNC: 403 MG/DL (ref 65–117)
GLUCOSE BLD STRIP.AUTO-MCNC: 96 MG/DL (ref 65–117)
GLUCOSE SERPL-MCNC: 167 MG/DL (ref 65–100)
HCT VFR BLD AUTO: 35 % (ref 35–47)
HGB BLD-MCNC: 11.5 G/DL (ref 11.5–16)
IMM GRANULOCYTES # BLD AUTO: 0 K/UL (ref 0–0.04)
IMM GRANULOCYTES NFR BLD AUTO: 0 % (ref 0–0.5)
LYMPHOCYTES # BLD: 1.6 K/UL (ref 0.8–3.5)
LYMPHOCYTES NFR BLD: 34 % (ref 12–49)
MCH RBC QN AUTO: 27.7 PG (ref 26–34)
MCHC RBC AUTO-ENTMCNC: 32.9 G/DL (ref 30–36.5)
MCV RBC AUTO: 84.3 FL (ref 80–99)
METAMYELOCYTES NFR BLD MANUAL: 2 %
MONOCYTES # BLD: 0.6 K/UL (ref 0–1)
MONOCYTES NFR BLD: 13 % (ref 5–13)
MYELOCYTES NFR BLD MANUAL: 1 %
NEUTS BAND NFR BLD MANUAL: 4 %
NEUTS SEG # BLD: 2.3 K/UL (ref 1.8–8)
NEUTS SEG NFR BLD: 46 % (ref 32–75)
NRBC # BLD: 0 K/UL (ref 0–0.01)
NRBC BLD-RTO: 0 PER 100 WBC
PLATELET # BLD AUTO: 209 K/UL (ref 150–400)
PMV BLD AUTO: 9.1 FL (ref 8.9–12.9)
POTASSIUM SERPL-SCNC: 3.8 MMOL/L (ref 3.5–5.1)
RBC # BLD AUTO: 4.15 M/UL (ref 3.8–5.2)
RBC MORPH BLD: ABNORMAL
SERVICE CMNT-IMP: ABNORMAL
SERVICE CMNT-IMP: NORMAL
SODIUM SERPL-SCNC: 133 MMOL/L (ref 136–145)
WBC # BLD AUTO: 4.6 K/UL (ref 3.6–11)

## 2024-01-11 PROCEDURE — 82962 GLUCOSE BLOOD TEST: CPT

## 2024-01-11 PROCEDURE — 36415 COLL VENOUS BLD VENIPUNCTURE: CPT

## 2024-01-11 PROCEDURE — 93306 TTE W/DOPPLER COMPLETE: CPT

## 2024-01-11 PROCEDURE — 6370000000 HC RX 637 (ALT 250 FOR IP): Performed by: PHYSICIAN ASSISTANT

## 2024-01-11 PROCEDURE — 2700000000 HC OXYGEN THERAPY PER DAY

## 2024-01-11 PROCEDURE — 80048 BASIC METABOLIC PNL TOTAL CA: CPT

## 2024-01-11 PROCEDURE — 6360000002 HC RX W HCPCS: Performed by: GENERAL ACUTE CARE HOSPITAL

## 2024-01-11 PROCEDURE — 1100000003 HC PRIVATE W/ TELEMETRY

## 2024-01-11 PROCEDURE — 2580000003 HC RX 258: Performed by: GENERAL ACUTE CARE HOSPITAL

## 2024-01-11 PROCEDURE — 6360000002 HC RX W HCPCS: Performed by: PHYSICIAN ASSISTANT

## 2024-01-11 PROCEDURE — 6370000000 HC RX 637 (ALT 250 FOR IP): Performed by: GENERAL ACUTE CARE HOSPITAL

## 2024-01-11 PROCEDURE — 94760 N-INVAS EAR/PLS OXIMETRY 1: CPT

## 2024-01-11 PROCEDURE — 85025 COMPLETE CBC W/AUTO DIFF WBC: CPT

## 2024-01-11 PROCEDURE — 6370000000 HC RX 637 (ALT 250 FOR IP): Performed by: HOSPITALIST

## 2024-01-11 PROCEDURE — 6360000002 HC RX W HCPCS: Performed by: INTERNAL MEDICINE

## 2024-01-11 PROCEDURE — 2500000003 HC RX 250 WO HCPCS: Performed by: GENERAL ACUTE CARE HOSPITAL

## 2024-01-11 PROCEDURE — 2580000003 HC RX 258: Performed by: PHYSICIAN ASSISTANT

## 2024-01-11 PROCEDURE — 94640 AIRWAY INHALATION TREATMENT: CPT

## 2024-01-11 PROCEDURE — 6370000000 HC RX 637 (ALT 250 FOR IP): Performed by: INTERNAL MEDICINE

## 2024-01-11 PROCEDURE — 94761 N-INVAS EAR/PLS OXIMETRY MLT: CPT

## 2024-01-11 RX ORDER — INSULIN LISPRO 100 [IU]/ML
8 INJECTION, SOLUTION INTRAVENOUS; SUBCUTANEOUS ONCE
Status: COMPLETED | OUTPATIENT
Start: 2024-01-11 | End: 2024-01-11

## 2024-01-11 RX ADMIN — GUAIFENESIN 600 MG: 600 TABLET, EXTENDED RELEASE ORAL at 09:35

## 2024-01-11 RX ADMIN — ENOXAPARIN SODIUM 40 MG: 100 INJECTION SUBCUTANEOUS at 09:43

## 2024-01-11 RX ADMIN — BARICITINIB 2 MG: 2 TABLET, FILM COATED ORAL at 09:35

## 2024-01-11 RX ADMIN — DIGOXIN 0.12 MG: 125 TABLET ORAL at 09:34

## 2024-01-11 RX ADMIN — POLYETHYLENE GLYCOL 3350 17 G: 17 POWDER, FOR SOLUTION ORAL at 09:34

## 2024-01-11 RX ADMIN — OSELTAMIVIR PHOSPHATE 75 MG: 75 CAPSULE ORAL at 22:01

## 2024-01-11 RX ADMIN — IPRATROPIUM BROMIDE AND ALBUTEROL SULFATE 1 DOSE: 2.5; .5 SOLUTION RESPIRATORY (INHALATION) at 08:10

## 2024-01-11 RX ADMIN — OSELTAMIVIR PHOSPHATE 75 MG: 75 CAPSULE ORAL at 09:34

## 2024-01-11 RX ADMIN — DEXAMETHASONE 6 MG: 4 TABLET ORAL at 09:34

## 2024-01-11 RX ADMIN — GUAIFENESIN 600 MG: 600 TABLET, EXTENDED RELEASE ORAL at 22:01

## 2024-01-11 RX ADMIN — INSULIN LISPRO 8 UNITS: 100 INJECTION, SOLUTION INTRAVENOUS; SUBCUTANEOUS at 22:33

## 2024-01-11 RX ADMIN — GLIPIZIDE 2.5 MG: 5 TABLET ORAL at 05:22

## 2024-01-11 RX ADMIN — SODIUM CHLORIDE, PRESERVATIVE FREE 10 ML: 5 INJECTION INTRAVENOUS at 09:36

## 2024-01-11 RX ADMIN — METHYLPREDNISOLONE SODIUM SUCCINATE 40 MG: 40 INJECTION, POWDER, LYOPHILIZED, FOR SOLUTION INTRAMUSCULAR; INTRAVENOUS at 13:49

## 2024-01-11 RX ADMIN — METHYLPREDNISOLONE SODIUM SUCCINATE 40 MG: 40 INJECTION, POWDER, LYOPHILIZED, FOR SOLUTION INTRAMUSCULAR; INTRAVENOUS at 22:02

## 2024-01-11 RX ADMIN — DOXYCYCLINE 100 MG: 100 INJECTION, POWDER, LYOPHILIZED, FOR SOLUTION INTRAVENOUS at 11:19

## 2024-01-11 RX ADMIN — IPRATROPIUM BROMIDE AND ALBUTEROL SULFATE 1 DOSE: 2.5; .5 SOLUTION RESPIRATORY (INHALATION) at 11:21

## 2024-01-11 RX ADMIN — IPRATROPIUM BROMIDE AND ALBUTEROL SULFATE 1 DOSE: 2.5; .5 SOLUTION RESPIRATORY (INHALATION) at 20:06

## 2024-01-11 RX ADMIN — SODIUM CHLORIDE, PRESERVATIVE FREE 10 ML: 5 INJECTION INTRAVENOUS at 22:02

## 2024-01-11 RX ADMIN — INSULIN LISPRO 8 UNITS: 100 INJECTION, SOLUTION INTRAVENOUS; SUBCUTANEOUS at 17:57

## 2024-01-11 ASSESSMENT — PAIN SCALES - GENERAL
PAINLEVEL_OUTOF10: 0
PAINLEVEL_OUTOF10: 0

## 2024-01-11 NOTE — PROGRESS NOTES
Pulmonary, Critical Care, and Sleep Medicine~Consult Note    Name: Kamilla Lantigua MRN: [id number]   : 1935 Hospital: [facility]   Date: 2024 1:10 PM Admission: 2024     Impression Plan   Acute hypoxemic respiratory failure  Viral infections: flu A, parainfluenza, covid-19 of uncertain acuity   bronchospasm  Abnormal chest CT w/ findings suspicious for MAC; bronchiectasis; LLL spiculated opacity  Hyponatremia  DM  AMS Supp o2 for goal sats >90%. Ex ox prior to DC  Tamiflu  Start solumedrol in place of dex given diffuse wheeze on exam  Empiric abx as per primary team  Baricitinib   Obtain sputum cx for cx and AFB if able  Continue nebs - will plan to set up for nebulizer for home use at d/c  Gentle diuresis as needed  F/u echo  Dvt ppx: lovenox         Daily Progression:    :  at bedside, he's concerned that the abx are making her confused. On 2L O2 (desats to high 80s on RA).     1/10/24 : Sitting up in bed. No distress. On RA sats 87%. On 2LPM sats 95%. D/W son at bedside. Per his report she was diagnosed w/ COVID and Flu A/B 3 weeks ago. She was not treated w/ any antivirals. She does well at home w/ mucinex and nebulizer treatments but does not have her own machine. Tmax 100.2. CRP elevated - 6.2.     HPI: 89 y/o F with PMH DM, chronic pain who presented from [facility] with SOB, cough, and fever (101.4). Hypoxic requiring 4L O2.  Temp here 100 F    Labs: wbc 5, Na 129, pct low, bnp 1305  Initial rapid covid-19 and flu swabs negative  RVP positive for influenza A, parainfluenza, covid-19    CTA chest 24: No PE. Bronchiectasis and associated pericardial thickening and reticular nodular  opacities in the lingula and right middle lobe suspicious for chronic BOO or  other atypical infection. Subpleural left lower lobe spiculated opacity measuring 4 x 9 x 8 mm.     ROS: feeling better today. Started having SOB about 5 days ago. She's

## 2024-01-11 NOTE — PROGRESS NOTES
Hospitalist Progress Note    NAME:   Kamilla Lantigua   : 1935   MRN: [id number]     Date/Time: 2024 2:47 PM  Patient PCP: No primary care provider on file.    Estimated discharge date: 24 hrs pending clinical improvement, wean O2    Assessment / Plan:  PNA, likely viral  Rule out BOO  Acute hypoxic respiratory failure due to the above  Influenza A/ parainfluenza and Covid19 positive   CTA chest: No pulmonary embolus.  Bronchiectasis and associated pericardial thickening and reticular nodular opacities in the lingula and right middle lobe suspicious for chronic BOO or  other atypical infection.  Subpleural left lower lobe spiculated opacity measuring 4 x 9 x 8 mm.  Continue Tamiflu  Empiric IV abx, last dose   Per son, pt tested positive for Covid and influenza 3 weeks ago  Continue decadron given hypoxia  Consult pharmacy to eval for baricitinib  AFB and sputum culture is ordered but nursing has not been able to obtain  Nebs prn  Wean O2 as tolerated.  Currently on 2LNC.  Hope to wean prior to discharge  Pulm evaluated, note is reviewed.  Appreciate recs.  Will need repeat imaging outpt per protocol     Hyponatremia  Likely due to SIADH, will fluid restrict  Repeat BMP in the AM    Lower extremity edema  LE duplex US neg for DVT  Echo is pending     Atrial fibrillation not on anticoagulation  Cont' digoxin     Type 2 diabetes  Hyperglycemic due to steroid use  Will resume glipizide  SSI    Hospital induced delirium reported by son  Worst due to acute illness.  Mentation waxing and waning.    Limit stimulation  Monitor closely for delirium       Updated pt's son and  via phone.          Medical Decision Making:   I personally reviewed labs  I personally reviewed imaging  Toxic drug monitoring  I personally reviewed EKG  Discussed case with: RNGERARD, discussed plan of care and dispo during interdisciplinary round        Code Status: full  DVT Prophylaxis: lovenox    Subjective:   Awake,  include:  Recent Labs     01/09/24  0402 01/10/24  0350 01/11/24  0345   WBC 5.0 2.6* 4.6   HGB 11.8 13.5 11.5   HCT 36.4 42.3 35.0    207 209       Recent Labs     01/08/24  1541 01/09/24  0402 01/10/24  0350 01/11/24  0345   * 129* 131* 133*   K 4.1 4.1 4.6 3.8   CL 95* 95* 92* 97   CO2 31 30 34* 33*   GLUCOSE 237* 182* 319* 167*   BUN 9 10 23* 36*   CREATININE 0.59 0.56 1.01 0.84   CALCIUM 9.2 8.9 9.8 9.0   MG 1.5*  --   --   --    LABALBU 3.9 3.9  --   --    BILITOT 0.6 0.4  --   --    AST 20 23  --   --    ALT 15 15  --   --          Signed: Aliya Carlisle MD

## 2024-01-11 NOTE — PLAN OF CARE
Problem: Discharge Planning  Goal: Discharge to home or other facility with appropriate resources  Outcome: Progressing  Flowsheets (Taken 1/11/2024 0810)  Discharge to home or other facility with appropriate resources: Identify barriers to discharge with patient and caregiver     Problem: Chronic Conditions and Co-morbidities  Goal: Patient's chronic conditions and co-morbidity symptoms are monitored and maintained or improved  Outcome: Progressing  Flowsheets (Taken 1/11/2024 0810)  Care Plan - Patient's Chronic Conditions and Co-Morbidity Symptoms are Monitored and Maintained or Improved: Monitor and assess patient's chronic conditions and comorbid symptoms for stability, deterioration, or improvement     Problem: Safety - Adult  Goal: Free from fall injury  Outcome: Progressing     Problem: Skin/Tissue Integrity  Goal: Absence of new skin breakdown  Description: 1.  Monitor for areas of redness and/or skin breakdown  2.  Assess vascular access sites hourly  3.  Every 4-6 hours minimum:  Change oxygen saturation probe site  4.  Every 4-6 hours:  If on nasal continuous positive airway pressure, respiratory therapy assess nares and determine need for appliance change or resting period.  Outcome: Progressing     Problem: Respiratory - Adult  Goal: Achieves optimal ventilation and oxygenation  Outcome: Progressing

## 2024-01-11 NOTE — PROGRESS NOTES
ADULT PROTOCOL: JET AEROSOL ASSESSMENT    Patient  Kamilla Lantigua     88 y.o.   female     1/11/2024  9:04 AM    Breath Sounds Pre Procedure: Breath Sounds Pre-Tx TONY: Diminished                                  Breath Sounds Pre-Tx LLL: Diminished        Breath Sounds Pre-Tx RUL: Diminished        Breath Sounds Pre-Tx RML: Diminished        Breath Sounds Pre-Tx RLL: Diminished  Breath Sounds Post Procedure: Breath Sounds Post-Tx TONY: Diminished          Breath Sounds Post-Tx LLL: Diminished          Breath Sounds Post-Tx RUL: Diminished          Breath Sounds Post-Tx RML: Diminished          Breath Sounds Post-Tx RLL: Diminished                                         Heart Rate: Pre procedure Pre-Tx Pulse: 61           Post procedure Post-Tx Pulse: 90    Resp Rate: Pre procedure Pre-Tx Resps: 18           Post procedure Post-Tx Resps: 16        Oxygen: O2 Therapy: Oxygen   nasal cannula     Changed: No    SpO2:  SpO2: 94 %   with Oxygen                Nebulizer Therapy: Current medications Medications: Albuterol/Ipratropium      Changed: No      Problem List:   Patient Active Problem List   Diagnosis    Hypoxia    Influenza    Closed right hip fracture, initial encounter ([facility])    Atypical pneumonia       Respiratory Therapist: Siena Horowitz RT

## 2024-01-12 VITALS
WEIGHT: 139.99 LBS | RESPIRATION RATE: 18 BRPM | HEIGHT: 64 IN | SYSTOLIC BLOOD PRESSURE: 146 MMHG | HEART RATE: 75 BPM | BODY MASS INDEX: 23.9 KG/M2 | DIASTOLIC BLOOD PRESSURE: 63 MMHG | TEMPERATURE: 97.2 F | OXYGEN SATURATION: 93 %

## 2024-01-12 LAB
ANION GAP SERPL CALC-SCNC: 5 MMOL/L (ref 5–15)
BASOPHILS # BLD: 0 K/UL (ref 0–0.1)
BASOPHILS NFR BLD: 0 % (ref 0–1)
BUN SERPL-MCNC: 31 MG/DL (ref 6–20)
BUN/CREAT SERPL: 41 (ref 12–20)
CALCIUM SERPL-MCNC: 9.4 MG/DL (ref 8.5–10.1)
CHLORIDE SERPL-SCNC: 97 MMOL/L (ref 97–108)
CO2 SERPL-SCNC: 30 MMOL/L (ref 21–32)
CREAT SERPL-MCNC: 0.76 MG/DL (ref 0.55–1.02)
DIFFERENTIAL METHOD BLD: ABNORMAL
EOSINOPHIL # BLD: 0 K/UL (ref 0–0.4)
EOSINOPHIL NFR BLD: 0 % (ref 0–7)
ERYTHROCYTE [DISTWIDTH] IN BLOOD BY AUTOMATED COUNT: 14.8 % (ref 11.5–14.5)
GLUCOSE BLD STRIP.AUTO-MCNC: 192 MG/DL (ref 65–117)
GLUCOSE BLD STRIP.AUTO-MCNC: 205 MG/DL (ref 65–117)
GLUCOSE SERPL-MCNC: 210 MG/DL (ref 65–100)
HCT VFR BLD AUTO: 37.3 % (ref 35–47)
HGB BLD-MCNC: 12.3 G/DL (ref 11.5–16)
IMM GRANULOCYTES # BLD AUTO: 0 K/UL (ref 0–0.04)
IMM GRANULOCYTES NFR BLD AUTO: 0 % (ref 0–0.5)
LYMPHOCYTES # BLD: 2.4 K/UL (ref 0.8–3.5)
LYMPHOCYTES NFR BLD: 48 % (ref 12–49)
MCH RBC QN AUTO: 27.3 PG (ref 26–34)
MCHC RBC AUTO-ENTMCNC: 33 G/DL (ref 30–36.5)
MCV RBC AUTO: 82.7 FL (ref 80–99)
MONOCYTES # BLD: 0.6 K/UL (ref 0–1)
MONOCYTES NFR BLD: 12 % (ref 5–13)
NEUTS SEG # BLD: 2 K/UL (ref 1.8–8)
NEUTS SEG NFR BLD: 40 % (ref 32–75)
NRBC # BLD: 0 K/UL (ref 0–0.01)
NRBC BLD-RTO: 0 PER 100 WBC
PLATELET # BLD AUTO: 272 K/UL (ref 150–400)
PMV BLD AUTO: 9.3 FL (ref 8.9–12.9)
POTASSIUM SERPL-SCNC: 4.3 MMOL/L (ref 3.5–5.1)
RBC # BLD AUTO: 4.51 M/UL (ref 3.8–5.2)
RBC MORPH BLD: ABNORMAL
SERVICE CMNT-IMP: ABNORMAL
SERVICE CMNT-IMP: ABNORMAL
SODIUM SERPL-SCNC: 132 MMOL/L (ref 136–145)
WBC # BLD AUTO: 5 K/UL (ref 3.6–11)
WBC MORPH BLD: ABNORMAL

## 2024-01-12 PROCEDURE — 82962 GLUCOSE BLOOD TEST: CPT

## 2024-01-12 PROCEDURE — 6370000000 HC RX 637 (ALT 250 FOR IP): Performed by: GENERAL ACUTE CARE HOSPITAL

## 2024-01-12 PROCEDURE — 85025 COMPLETE CBC W/AUTO DIFF WBC: CPT

## 2024-01-12 PROCEDURE — 6370000000 HC RX 637 (ALT 250 FOR IP): Performed by: INTERNAL MEDICINE

## 2024-01-12 PROCEDURE — 94640 AIRWAY INHALATION TREATMENT: CPT

## 2024-01-12 PROCEDURE — 2580000003 HC RX 258: Performed by: GENERAL ACUTE CARE HOSPITAL

## 2024-01-12 PROCEDURE — 80048 BASIC METABOLIC PNL TOTAL CA: CPT

## 2024-01-12 PROCEDURE — 36415 COLL VENOUS BLD VENIPUNCTURE: CPT

## 2024-01-12 PROCEDURE — 94760 N-INVAS EAR/PLS OXIMETRY 1: CPT

## 2024-01-12 PROCEDURE — 6370000000 HC RX 637 (ALT 250 FOR IP): Performed by: PHYSICIAN ASSISTANT

## 2024-01-12 PROCEDURE — 6360000002 HC RX W HCPCS: Performed by: GENERAL ACUTE CARE HOSPITAL

## 2024-01-12 PROCEDURE — 94761 N-INVAS EAR/PLS OXIMETRY MLT: CPT

## 2024-01-12 RX ORDER — OSELTAMIVIR PHOSPHATE 75 MG/1
75 CAPSULE ORAL 2 TIMES DAILY
Qty: 4 CAPSULE | Refills: 0 | Status: SHIPPED | OUTPATIENT
Start: 2024-01-12 | End: 2024-01-14

## 2024-01-12 RX ORDER — IPRATROPIUM BROMIDE AND ALBUTEROL SULFATE 2.5; .5 MG/3ML; MG/3ML
3 SOLUTION RESPIRATORY (INHALATION) EVERY 4 HOURS PRN
Qty: 360 ML | Refills: 0 | Status: SHIPPED | OUTPATIENT
Start: 2024-01-12

## 2024-01-12 RX ORDER — ALBUTEROL SULFATE 90 UG/1
2 AEROSOL, METERED RESPIRATORY (INHALATION) 4 TIMES DAILY PRN
Qty: 18 G | Refills: 0 | Status: SHIPPED | OUTPATIENT
Start: 2024-01-12

## 2024-01-12 RX ORDER — GUAIFENESIN 600 MG/1
600 TABLET, EXTENDED RELEASE ORAL 2 TIMES DAILY
Qty: 14 TABLET | Refills: 0 | Status: SHIPPED | OUTPATIENT
Start: 2024-01-12 | End: 2024-01-19

## 2024-01-12 RX ADMIN — ENOXAPARIN SODIUM 40 MG: 100 INJECTION SUBCUTANEOUS at 10:30

## 2024-01-12 RX ADMIN — IPRATROPIUM BROMIDE AND ALBUTEROL SULFATE 1 DOSE: 2.5; .5 SOLUTION RESPIRATORY (INHALATION) at 07:55

## 2024-01-12 RX ADMIN — GLIPIZIDE 2.5 MG: 5 TABLET ORAL at 05:32

## 2024-01-12 RX ADMIN — GUAIFENESIN 600 MG: 600 TABLET, EXTENDED RELEASE ORAL at 10:31

## 2024-01-12 RX ADMIN — IPRATROPIUM BROMIDE AND ALBUTEROL SULFATE 1 DOSE: 2.5; .5 SOLUTION RESPIRATORY (INHALATION) at 11:29

## 2024-01-12 RX ADMIN — SODIUM CHLORIDE, PRESERVATIVE FREE 10 ML: 5 INJECTION INTRAVENOUS at 10:31

## 2024-01-12 RX ADMIN — INSULIN LISPRO 2 UNITS: 100 INJECTION, SOLUTION INTRAVENOUS; SUBCUTANEOUS at 13:58

## 2024-01-12 RX ADMIN — DIGOXIN 0.12 MG: 125 TABLET ORAL at 10:31

## 2024-01-12 ASSESSMENT — PAIN SCALES - GENERAL: PAINLEVEL_OUTOF10: 0

## 2024-01-12 NOTE — CARE COORDINATION
Transition of Care Plan:    RUR: 15%  Prior Level of Functioning: needs assistance   Disposition: home with HHC- [facility]   Follow up appointments: PCP, Pulmonary   DME needed: home nebulizer- will be delivered to pt's home   Transportation at discharge: family at bedside   IM/IMM Medicare/ letter given: given  Is patient a [place] and connected with [facility]? no   If yes, was [place] transfer form completed and [facility] notified?   Caregiver Contact: spouse  Discharge Caregiver contacted prior to discharge? yes  Care Conference needed? No  Barriers to discharge:  none    CM aware of discharge order. 2nd IM given and reviewed. Family to transport home. Spoke with spouse regarding discharge needs. Spouse agreeable to HHC services- hx of [facility] and would like to use again. Referral sent via [employer] and pt accepted. Info added to AVS. Pt in need of home nebulizer- order submitted to [employer]- accepted and will be delivered to pt's home address. No further CM needs identified. Ready for D/C from CM standpoint. Informed assigned RN.       01/12/24 1162   Services At/After Discharge   Transition of Care Consult (CM Consult) Discharge Planning   Services At/After Discharge DME;Home Health   [place] Resource Information Provided? No   Mode of Transport at Discharge Other (see comment)  (family)   Confirm Follow Up Transport Family   Condition of Participation: Discharge Planning   The Plan for Transition of Care is related to the following treatment goals: HHC, DME   The Patient and/or Patient Representative was provided with a Choice of Provider? Patient Representative;Patient   Name of the Patient Representative who was provided with the Choice of Provider and agrees with the Discharge Plan?  spouse   The Patient and/Or Patient Representative agree with the Discharge Plan? Yes   Freedom of Choice list was provided with basic dialogue that supports the patient's individualized plan of care/goals, treatment

## 2024-01-12 NOTE — PLAN OF CARE
Problem: Discharge Planning  Goal: Discharge to home or other facility with appropriate resources  1/12/2024 0033 by Neo Sandhu, RN  Outcome: Progressing  Flowsheets (Taken 1/11/2024 2001)  Discharge to home or other facility with appropriate resources:   Identify barriers to discharge with patient and caregiver   Arrange for needed discharge resources and transportation as appropriate   Identify discharge learning needs (meds, wound care, etc)   Arrange for interpreters to assist at discharge as needed   Refer to discharge planning if patient needs post-hospital services based on physician order or complex needs related to functional status, cognitive ability or social support system  1/11/2024 1135 by Nahum Cadena, RN  Outcome: Progressing  Flowsheets (Taken 1/11/2024 0810)  Discharge to home or other facility with appropriate resources: Identify barriers to discharge with patient and caregiver     Problem: Chronic Conditions and Co-morbidities  Goal: Patient's chronic conditions and co-morbidity symptoms are monitored and maintained or improved  1/12/2024 0033 by Neo Sandhu, RN  Outcome: Progressing  Flowsheets (Taken 1/11/2024 2001)  Care Plan - Patient's Chronic Conditions and Co-Morbidity Symptoms are Monitored and Maintained or Improved:   Monitor and assess patient's chronic conditions and comorbid symptoms for stability, deterioration, or improvement   Collaborate with multidisciplinary team to address chronic and comorbid conditions and prevent exacerbation or deterioration   Update acute care plan with appropriate goals if chronic or comorbid symptoms are exacerbated and prevent overall improvement and discharge  1/11/2024 1135 by Nahum Cadena, RN  Outcome: Progressing  Flowsheets (Taken 1/11/2024 0810)  Care Plan - Patient's Chronic Conditions and Co-Morbidity Symptoms are Monitored and Maintained or Improved: Monitor and assess patient's chronic conditions and comorbid symptoms for

## 2024-01-12 NOTE — DISCHARGE SUMMARY
Discharge Summary    Name: Kamilla Lantigua  [id number]  YOB: 1935 (Age: 88 y.o.)   Date of Admission: 1/8/2024  Date of Discharge: 1/12/2024  Attending Physician: Aliya Carlisle MD    Discharge Diagnosis:   PNA, likely viral  Rule out MAC  Acute hypoxic respiratory failure due to the above  Influenza A/ parainfluenza and Covid19 positive    Hyponatremia  Lower extremity edema   Atrial fibrillation not on anticoagulation  Type 2 diabetes  Hospital induced delirium reported by son    Consultations:  IP CONSULT TO PULMONOLOGY  IP CONSULT TO PHARMACY  IP CONSULT TO CASE MANAGEMENT      Brief Admission History/Reason for Admission Per Aliya Juan MD:   Kamilla Lantigua is a 88 y.o.  female with PMHx significant for  has a past medical history of Arrhythmia, Bronchitis, Chronic pain, Diabetes (HCC), and History of blood transfusion.      Patient started having shortness of breath, cough and fever (101.4), all started 2 days ago.  Symptoms getting worse and family had to bring the patient to urgent care.  At the urgent care, reportedly she was febrile and requiring 4 L of oxygen and so was sent to the emergency room for further management.  Patient was also complaining of nausea.  Son reported that patient started having swelling after hip fracture on last admission.     ED w/up showed sodium 130, chloride 95, magnesium 1.5, NT proBNP 1300, troponin normal, glucose 237, normal WBC, hemoglobin 11.4, normal platelet count.  Negative flu/rapid COVID.    Brief Hospital Course by Main Problems:     PNA, likely viral  Rule out MAC  Acute hypoxic respiratory failure due to the above  Influenza A/ parainfluenza and Covid19 positive   Per son, pt tested positive for Covid and influenza 3 weeks ago.  CTA chest: No pulmonary embolus.  Bronchiectasis and associated pericardial thickening and reticular nodular opacities in the lingula and right middle lobe suspicious for chronic BOO

## 2024-01-12 NOTE — PROGRESS NOTES
Pulmonary, Critical Care, and Sleep Medicine~Consult Note    Name: Kamilla Lantigua MRN: [id number]   : 1935 Hospital: [facility]   Date: 2024 12:14 PM Admission: 2024     Impression Plan   Acute hypoxemic respiratory failure  Viral infections: flu A, parainfluenza, covid-19 of uncertain acuity   bronchospasm  Abnormal chest CT w/ findings suspicious for MAC; bronchiectasis; LLL spiculated opacity  Hyponatremia  DM  AMS Supp o2 for goal sats >90%. Ex ox prior to DC  Tamiflu  Transition to prednisone taper  Baricitinib   Obtain sputum cx for cx and AFB if able  Continue nebs - will plan to set up for nebulizer for home use at d/c  Gentle diuresis as needed  Dvt ppx: lovenox  Will sign off and arrange f/u with us in clinic         Daily Progression:    :  at bedside. I d/w son over the phone. He feels that her mental status and hallucinations have improved since abx were not given last night (family refused). She's on RA    :  at bedside, he's concerned that the abx are making her confused. On 2L O2 (desats to high 80s on RA).     1/10/24 : Sitting up in bed. No distress. On RA sats 87%. On 2LPM sats 95%. D/W son at bedside. Per his report she was diagnosed w/ COVID and Flu A/B 3 weeks ago. She was not treated w/ any antivirals. She does well at home w/ mucinex and nebulizer treatments but does not have her own machine. Tmax 100.2. CRP elevated - 6.2.     HPI: 87 y/o F with PMH DM, chronic pain who presented from [facility] with SOB, cough, and fever (101.4). Hypoxic requiring 4L O2.  Temp here 100 F    Labs: wbc 5, Na 129, pct low, bnp 1305  Initial rapid covid-19 and flu swabs negative  RVP positive for influenza A, parainfluenza, covid-19    CTA chest 24: No PE. Bronchiectasis and associated pericardial thickening and reticular nodular  opacities in the lingula and right middle lobe suspicious for chronic BOO or  other

## 2024-01-12 NOTE — PROGRESS NOTES
Patient refused some of her night meds and antibiotics; Kevin Patel was informed. Patient concerns to be discussed by day shift.      Bedside and Verbal shift change report given to JEANNE Fuentes, RN (oncoming nurse) by RICHARD Larson (offgoing nurse). Report included the following information Nurse Handoff Report, Index, Intake/Output, MAR, Recent Results, and Cardiac Rhythm Sinus Rhythm .

## 2024-01-12 NOTE — PROGRESS NOTES
ADULT PROTOCOL: JET AEROSOL ASSESSMENT    Patient  Kamilla Lantigua     88 y.o.   female     1/12/2024  8:47 AM    Breath Sounds Pre Procedure: Breath Sounds Pre-Tx TONY: Diminished                                  Breath Sounds Pre-Tx LLL: Diminished        Breath Sounds Pre-Tx RUL: Diminished        Breath Sounds Pre-Tx RML: Diminished        Breath Sounds Pre-Tx RLL: Diminished  Breath Sounds Post Procedure: Breath Sounds Post-Tx TONY: Diminished          Breath Sounds Post-Tx LLL: Diminished          Breath Sounds Post-Tx RUL: Diminished          Breath Sounds Post-Tx RML: Diminished          Breath Sounds Post-Tx RLL: Diminished                                         Heart Rate: Pre procedure Pre-Tx Pulse: 73           Post procedure Post-Tx Pulse: 71    Resp Rate: Pre procedure Pre-Tx Resps: 18           Post procedure Post-Tx Resps: 18      Oxygen: O2 Therapy: Room air        Changed: No    SpO2:  SpO2: 91 %   without Oxygen                Nebulizer Therapy: Current medications Medications: Albuterol/Ipratropium      Changed: No        Problem List:   Patient Active Problem List   Diagnosis    Hypoxia    Influenza    Closed right hip fracture, initial encounter ([facility])    Atypical pneumonia       Respiratory Therapist: Siena Horowitz RT

## 2024-02-20 LAB
L PNEUMO1 AG UR QL IA: NEGATIVE
SPECIMEN SOURCE: NORMAL

## 2024-03-02 NOTE — PROGRESS NOTES
Occupational Therapy    Acknowledged order and chart reviewed. Spoke with RN, pt is currently running a fever, O x1, confused, lethargic, and not following commands. Will defer today and continue to follow for OT eval.     Monica Kwon, OT     03-Mar-2024 00:35

## 2024-03-25 ENCOUNTER — APPOINTMENT (OUTPATIENT)
Facility: HOSPITAL | Age: 89
DRG: 639 | End: 2024-03-25
Payer: MEDICARE

## 2024-03-25 ENCOUNTER — HOSPITAL ENCOUNTER (INPATIENT)
Facility: HOSPITAL | Age: 89
LOS: 1 days | Discharge: HOME | DRG: 639 | End: 2024-03-27
Attending: EMERGENCY MEDICINE | Admitting: STUDENT IN AN ORGANIZED HEALTH CARE EDUCATION/TRAINING PROGRAM
Payer: MEDICARE

## 2024-03-25 DIAGNOSIS — I48.0 PAROXYSMAL ATRIAL FIBRILLATION (HCC): ICD-10-CM

## 2024-03-25 DIAGNOSIS — J96.01 ACUTE RESPIRATORY FAILURE WITH HYPOXIA (HCC): Primary | ICD-10-CM

## 2024-03-25 DIAGNOSIS — R55 NEAR SYNCOPE: ICD-10-CM

## 2024-03-25 LAB
ALBUMIN SERPL-MCNC: 3.9 G/DL (ref 3.5–5)
ALBUMIN/GLOB SERPL: 0.9 (ref 1.1–2.2)
ALP SERPL-CCNC: 84 U/L (ref 45–117)
ALT SERPL-CCNC: 13 U/L (ref 12–78)
ANION GAP SERPL CALC-SCNC: 4 MMOL/L (ref 5–15)
APPEARANCE UR: CLEAR
AST SERPL-CCNC: 12 U/L (ref 15–37)
BACTERIA URNS QL MICRO: ABNORMAL /HPF
BILIRUB SERPL-MCNC: 0.6 MG/DL (ref 0.2–1)
BILIRUB UR QL: NEGATIVE
BUN SERPL-MCNC: 15 MG/DL (ref 6–20)
BUN/CREAT SERPL: 26 (ref 12–20)
CALCIUM SERPL-MCNC: 9.1 MG/DL (ref 8.5–10.1)
CHLORIDE SERPL-SCNC: 96 MMOL/L (ref 97–108)
CO2 SERPL-SCNC: 33 MMOL/L (ref 21–32)
COLOR UR: ABNORMAL
CREAT SERPL-MCNC: 0.57 MG/DL (ref 0.55–1.02)
EPITH CASTS URNS QL MICRO: ABNORMAL /LPF
ERYTHROCYTE [DISTWIDTH] IN BLOOD BY AUTOMATED COUNT: 13.5 % (ref 11.5–14.5)
FLUAV AG NPH QL IA: NEGATIVE
FLUBV AG NOSE QL IA: NEGATIVE
GLOBULIN SER CALC-MCNC: 4.2 G/DL (ref 2–4)
GLUCOSE BLD STRIP.AUTO-MCNC: 125 MG/DL (ref 65–117)
GLUCOSE SERPL-MCNC: 174 MG/DL (ref 65–100)
GLUCOSE UR STRIP.AUTO-MCNC: NEGATIVE MG/DL
HCT VFR BLD AUTO: 38 % (ref 35–47)
HGB BLD-MCNC: 11.8 G/DL (ref 11.5–16)
HGB UR QL STRIP: NEGATIVE
HYALINE CASTS URNS QL MICRO: ABNORMAL /LPF (ref 0–2)
KETONES UR QL STRIP.AUTO: 15 MG/DL
LEUKOCYTE ESTERASE UR QL STRIP.AUTO: ABNORMAL
MAGNESIUM SERPL-MCNC: 1.7 MG/DL (ref 1.6–2.4)
MCH RBC QN AUTO: 28.4 PG (ref 26–34)
MCHC RBC AUTO-ENTMCNC: 31.1 G/DL (ref 30–36.5)
MCV RBC AUTO: 91.3 FL (ref 80–99)
NITRITE UR QL STRIP.AUTO: NEGATIVE
NRBC # BLD: 0 K/UL (ref 0–0.01)
NRBC BLD-RTO: 0 PER 100 WBC
NT PRO BNP: 142 PG/ML
PH UR STRIP: 5.5 (ref 5–8)
PLATELET # BLD AUTO: 258 K/UL (ref 150–400)
PMV BLD AUTO: 8.8 FL (ref 8.9–12.9)
POTASSIUM SERPL-SCNC: 4.2 MMOL/L (ref 3.5–5.1)
PROT SERPL-MCNC: 8.1 G/DL (ref 6.4–8.2)
PROT UR STRIP-MCNC: NEGATIVE MG/DL
RBC # BLD AUTO: 4.16 M/UL (ref 3.8–5.2)
RBC #/AREA URNS HPF: ABNORMAL /HPF (ref 0–5)
SARS-COV-2 RDRP RESP QL NAA+PROBE: NOT DETECTED
SERVICE CMNT-IMP: ABNORMAL
SODIUM SERPL-SCNC: 133 MMOL/L (ref 136–145)
SOURCE: NORMAL
SP GR UR REFRACTOMETRY: 1.02 (ref 1–1.03)
TROPONIN I SERPL HS-MCNC: 6 NG/L (ref 0–51)
URINE CULTURE IF INDICATED: ABNORMAL
UROBILINOGEN UR QL STRIP.AUTO: 0.2 EU/DL (ref 0.2–1)
WBC # BLD AUTO: 7.7 K/UL (ref 3.6–11)
WBC URNS QL MICRO: ABNORMAL /HPF (ref 0–4)

## 2024-03-25 PROCEDURE — 71275 CT ANGIOGRAPHY CHEST: CPT

## 2024-03-25 PROCEDURE — 6360000004 HC RX CONTRAST MEDICATION: Performed by: RADIOLOGY

## 2024-03-25 PROCEDURE — 81001 URINALYSIS AUTO W/SCOPE: CPT

## 2024-03-25 PROCEDURE — 87804 INFLUENZA ASSAY W/OPTIC: CPT

## 2024-03-25 PROCEDURE — 87086 URINE CULTURE/COLONY COUNT: CPT

## 2024-03-25 PROCEDURE — 36415 COLL VENOUS BLD VENIPUNCTURE: CPT

## 2024-03-25 PROCEDURE — 71045 X-RAY EXAM CHEST 1 VIEW: CPT

## 2024-03-25 PROCEDURE — 70450 CT HEAD/BRAIN W/O DYE: CPT

## 2024-03-25 PROCEDURE — 87635 SARS-COV-2 COVID-19 AMP PRB: CPT

## 2024-03-25 PROCEDURE — 93005 ELECTROCARDIOGRAM TRACING: CPT | Performed by: EMERGENCY MEDICINE

## 2024-03-25 PROCEDURE — 99285 EMERGENCY DEPT VISIT HI MDM: CPT

## 2024-03-25 PROCEDURE — 80053 COMPREHEN METABOLIC PANEL: CPT

## 2024-03-25 PROCEDURE — 83735 ASSAY OF MAGNESIUM: CPT

## 2024-03-25 PROCEDURE — 83880 ASSAY OF NATRIURETIC PEPTIDE: CPT

## 2024-03-25 PROCEDURE — 84484 ASSAY OF TROPONIN QUANT: CPT

## 2024-03-25 PROCEDURE — 85027 COMPLETE CBC AUTOMATED: CPT

## 2024-03-25 PROCEDURE — 82962 GLUCOSE BLOOD TEST: CPT

## 2024-03-25 RX ORDER — ONDANSETRON 2 MG/ML
4 INJECTION INTRAMUSCULAR; INTRAVENOUS ONCE
Status: DISCONTINUED | OUTPATIENT
Start: 2024-03-25 | End: 2024-03-26

## 2024-03-25 RX ORDER — ONDANSETRON 2 MG/ML
4 INJECTION INTRAMUSCULAR; INTRAVENOUS EVERY 4 HOURS PRN
Status: DISCONTINUED | OUTPATIENT
Start: 2024-03-25 | End: 2024-03-26

## 2024-03-25 RX ADMIN — IOPAMIDOL 65 ML: 755 INJECTION, SOLUTION INTRAVENOUS at 22:25

## 2024-03-25 ASSESSMENT — PAIN SCALES - GENERAL: PAINLEVEL_OUTOF10: 0

## 2024-03-26 PROBLEM — J96.01 ACUTE HYPOXEMIC RESPIRATORY FAILURE (HCC): Status: ACTIVE | Noted: 2024-03-26

## 2024-03-26 LAB
DIGOXIN SERPL-MCNC: 0.4 NG/ML (ref 0.9–2)
GLUCOSE BLD STRIP.AUTO-MCNC: 121 MG/DL (ref 65–117)
GLUCOSE BLD STRIP.AUTO-MCNC: 158 MG/DL (ref 65–117)
GLUCOSE BLD STRIP.AUTO-MCNC: 165 MG/DL (ref 65–117)
GLUCOSE BLD STRIP.AUTO-MCNC: 211 MG/DL (ref 65–117)
GLUCOSE BLD STRIP.AUTO-MCNC: 292 MG/DL (ref 65–117)
SERVICE CMNT-IMP: ABNORMAL

## 2024-03-26 PROCEDURE — 94640 AIRWAY INHALATION TREATMENT: CPT

## 2024-03-26 PROCEDURE — 6370000000 HC RX 637 (ALT 250 FOR IP): Performed by: NURSE PRACTITIONER

## 2024-03-26 PROCEDURE — 6370000000 HC RX 637 (ALT 250 FOR IP): Performed by: STUDENT IN AN ORGANIZED HEALTH CARE EDUCATION/TRAINING PROGRAM

## 2024-03-26 PROCEDURE — 2580000003 HC RX 258: Performed by: STUDENT IN AN ORGANIZED HEALTH CARE EDUCATION/TRAINING PROGRAM

## 2024-03-26 PROCEDURE — 82962 GLUCOSE BLOOD TEST: CPT

## 2024-03-26 PROCEDURE — 36415 COLL VENOUS BLD VENIPUNCTURE: CPT

## 2024-03-26 PROCEDURE — 97535 SELF CARE MNGMENT TRAINING: CPT

## 2024-03-26 PROCEDURE — 6360000002 HC RX W HCPCS: Performed by: STUDENT IN AN ORGANIZED HEALTH CARE EDUCATION/TRAINING PROGRAM

## 2024-03-26 PROCEDURE — 97166 OT EVAL MOD COMPLEX 45 MIN: CPT

## 2024-03-26 PROCEDURE — 92610 EVALUATE SWALLOWING FUNCTION: CPT

## 2024-03-26 PROCEDURE — 80162 ASSAY OF DIGOXIN TOTAL: CPT

## 2024-03-26 PROCEDURE — 97116 GAIT TRAINING THERAPY: CPT

## 2024-03-26 PROCEDURE — 97161 PT EVAL LOW COMPLEX 20 MIN: CPT

## 2024-03-26 PROCEDURE — 1100000003 HC PRIVATE W/ TELEMETRY

## 2024-03-26 RX ORDER — GUAIFENESIN 600 MG/1
600 TABLET, EXTENDED RELEASE ORAL 2 TIMES DAILY
Status: DISCONTINUED | OUTPATIENT
Start: 2024-03-26 | End: 2024-03-27 | Stop reason: HOSPADM

## 2024-03-26 RX ORDER — ONDANSETRON 4 MG/1
4 TABLET, ORALLY DISINTEGRATING ORAL EVERY 8 HOURS PRN
Status: DISCONTINUED | OUTPATIENT
Start: 2024-03-26 | End: 2024-03-27 | Stop reason: HOSPADM

## 2024-03-26 RX ORDER — SODIUM CHLORIDE 9 MG/ML
INJECTION, SOLUTION INTRAVENOUS PRN
Status: DISCONTINUED | OUTPATIENT
Start: 2024-03-26 | End: 2024-03-27 | Stop reason: HOSPADM

## 2024-03-26 RX ORDER — ACETAMINOPHEN 325 MG/1
650 TABLET ORAL EVERY 6 HOURS PRN
Status: DISCONTINUED | OUTPATIENT
Start: 2024-03-26 | End: 2024-03-27 | Stop reason: HOSPADM

## 2024-03-26 RX ORDER — GLUCAGON 1 MG/ML
1 KIT INJECTION PRN
Status: DISCONTINUED | OUTPATIENT
Start: 2024-03-26 | End: 2024-03-27 | Stop reason: HOSPADM

## 2024-03-26 RX ORDER — SODIUM CHLORIDE FOR INHALATION 3 %
4 VIAL, NEBULIZER (ML) INHALATION PRN
Status: DISCONTINUED | OUTPATIENT
Start: 2024-03-26 | End: 2024-03-27 | Stop reason: HOSPADM

## 2024-03-26 RX ORDER — DIGOXIN 125 MCG
0.12 TABLET ORAL DAILY
Status: DISCONTINUED | OUTPATIENT
Start: 2024-03-26 | End: 2024-03-27 | Stop reason: HOSPADM

## 2024-03-26 RX ORDER — POLYETHYLENE GLYCOL 3350 17 G/17G
17 POWDER, FOR SOLUTION ORAL DAILY PRN
Status: DISCONTINUED | OUTPATIENT
Start: 2024-03-26 | End: 2024-03-27 | Stop reason: HOSPADM

## 2024-03-26 RX ORDER — DEXTROSE MONOHYDRATE 100 MG/ML
INJECTION, SOLUTION INTRAVENOUS CONTINUOUS PRN
Status: DISCONTINUED | OUTPATIENT
Start: 2024-03-26 | End: 2024-03-27 | Stop reason: HOSPADM

## 2024-03-26 RX ORDER — ONDANSETRON 2 MG/ML
4 INJECTION INTRAMUSCULAR; INTRAVENOUS EVERY 6 HOURS PRN
Status: DISCONTINUED | OUTPATIENT
Start: 2024-03-26 | End: 2024-03-27 | Stop reason: HOSPADM

## 2024-03-26 RX ORDER — SODIUM CHLORIDE 0.9 % (FLUSH) 0.9 %
5-40 SYRINGE (ML) INJECTION EVERY 12 HOURS SCHEDULED
Status: DISCONTINUED | OUTPATIENT
Start: 2024-03-26 | End: 2024-03-27 | Stop reason: HOSPADM

## 2024-03-26 RX ORDER — IPRATROPIUM BROMIDE AND ALBUTEROL SULFATE 2.5; .5 MG/3ML; MG/3ML
1 SOLUTION RESPIRATORY (INHALATION)
Status: DISCONTINUED | OUTPATIENT
Start: 2024-03-26 | End: 2024-03-27 | Stop reason: HOSPADM

## 2024-03-26 RX ORDER — MAGNESIUM SULFATE 1 G/100ML
1000 INJECTION INTRAVENOUS ONCE
Status: COMPLETED | OUTPATIENT
Start: 2024-03-26 | End: 2024-03-26

## 2024-03-26 RX ORDER — SODIUM CHLORIDE 0.9 % (FLUSH) 0.9 %
5-40 SYRINGE (ML) INJECTION PRN
Status: DISCONTINUED | OUTPATIENT
Start: 2024-03-26 | End: 2024-03-27 | Stop reason: HOSPADM

## 2024-03-26 RX ORDER — ENOXAPARIN SODIUM 100 MG/ML
40 INJECTION SUBCUTANEOUS DAILY
Status: DISCONTINUED | OUTPATIENT
Start: 2024-03-26 | End: 2024-03-27 | Stop reason: HOSPADM

## 2024-03-26 RX ORDER — LANOLIN ALCOHOL/MO/W.PET/CERES
3 CREAM (GRAM) TOPICAL NIGHTLY
Status: DISCONTINUED | OUTPATIENT
Start: 2024-03-26 | End: 2024-03-27 | Stop reason: HOSPADM

## 2024-03-26 RX ORDER — SODIUM CHLORIDE, SODIUM LACTATE, POTASSIUM CHLORIDE, CALCIUM CHLORIDE 600; 310; 30; 20 MG/100ML; MG/100ML; MG/100ML; MG/100ML
INJECTION, SOLUTION INTRAVENOUS CONTINUOUS
Status: ACTIVE | OUTPATIENT
Start: 2024-03-26 | End: 2024-03-26

## 2024-03-26 RX ORDER — ACETAMINOPHEN 650 MG/1
650 SUPPOSITORY RECTAL EVERY 6 HOURS PRN
Status: DISCONTINUED | OUTPATIENT
Start: 2024-03-26 | End: 2024-03-27 | Stop reason: HOSPADM

## 2024-03-26 RX ORDER — INSULIN LISPRO 100 [IU]/ML
0-8 INJECTION, SOLUTION INTRAVENOUS; SUBCUTANEOUS
Status: DISCONTINUED | OUTPATIENT
Start: 2024-03-26 | End: 2024-03-27 | Stop reason: HOSPADM

## 2024-03-26 RX ORDER — INSULIN LISPRO 100 [IU]/ML
0-4 INJECTION, SOLUTION INTRAVENOUS; SUBCUTANEOUS NIGHTLY
Status: DISCONTINUED | OUTPATIENT
Start: 2024-03-26 | End: 2024-03-27 | Stop reason: HOSPADM

## 2024-03-26 RX ORDER — IPRATROPIUM BROMIDE AND ALBUTEROL SULFATE 2.5; .5 MG/3ML; MG/3ML
1 SOLUTION RESPIRATORY (INHALATION) EVERY 4 HOURS PRN
Status: DISCONTINUED | OUTPATIENT
Start: 2024-03-26 | End: 2024-03-27 | Stop reason: HOSPADM

## 2024-03-26 RX ADMIN — MAGNESIUM SULFATE HEPTAHYDRATE 1000 MG: 1 INJECTION, SOLUTION INTRAVENOUS at 01:52

## 2024-03-26 RX ADMIN — SODIUM CHLORIDE 1000 MG: 900 INJECTION INTRAVENOUS at 01:42

## 2024-03-26 RX ADMIN — IPRATROPIUM BROMIDE AND ALBUTEROL SULFATE 1 DOSE: .5; 3 SOLUTION RESPIRATORY (INHALATION) at 20:03

## 2024-03-26 RX ADMIN — MELATONIN 3 MG: at 21:18

## 2024-03-26 RX ADMIN — SODIUM CHLORIDE, POTASSIUM CHLORIDE, SODIUM LACTATE AND CALCIUM CHLORIDE: 600; 310; 30; 20 INJECTION, SOLUTION INTRAVENOUS at 01:45

## 2024-03-26 RX ADMIN — MELATONIN 3 MG: at 01:42

## 2024-03-26 RX ADMIN — SODIUM CHLORIDE, PRESERVATIVE FREE 10 ML: 5 INJECTION INTRAVENOUS at 08:56

## 2024-03-26 RX ADMIN — SODIUM CHLORIDE, PRESERVATIVE FREE 10 ML: 5 INJECTION INTRAVENOUS at 21:19

## 2024-03-26 RX ADMIN — GUAIFENESIN 600 MG: 600 TABLET, EXTENDED RELEASE ORAL at 21:19

## 2024-03-26 RX ADMIN — ENOXAPARIN SODIUM 40 MG: 100 INJECTION SUBCUTANEOUS at 08:56

## 2024-03-26 RX ADMIN — DIGOXIN 0.12 MG: 125 TABLET ORAL at 08:56

## 2024-03-26 RX ADMIN — GUAIFENESIN 600 MG: 600 TABLET, EXTENDED RELEASE ORAL at 14:07

## 2024-03-26 ASSESSMENT — PAIN SCALES - GENERAL
PAINLEVEL_OUTOF10: 0

## 2024-03-26 NOTE — ED PROVIDER NOTES
[facility] EMERGENCY DEPT  EMERGENCY DEPARTMENT ENCOUNTER       Pt Name: Kamilla Lantigua  MRN: [id number]  Birthdate 1935  Date of evaluation: 3/25/2024  Provider: Kaushik Wong MD   PCP: No primary care provider on file.  Note Started: 3:46 AM 3/25/24     CHIEF COMPLAINT       Chief Complaint   Patient presents with    Altered Mental Status     Pt arrives with  and son who describe pt sitting at home about 30 minutes ago \"going out of it\".  Son states he checked BG at home and it read 29, then when he rechecked it read 99, son states pt became lethargic and confused but has improved on ride to hospital        HISTORY OF PRESENT ILLNESS: 1 or more elements      History From: Patient, , son, EMS, History limited by: No limitations     Kamilla Lantigua is a 89 y.o. female with history of diabetes who presents with chief complaint of, altered mental status, generalized weakness, near syncope.  This occurred acutely about an hour prior to arrival.  Patient was sitting down talking to her son when she had sudden onset lightheadedness, weakness, and felt \"funny\".  Son states that her eyes rolled into the back of her head and she was unresponsive for about 5 seconds, no seizure activity noted.  She then was back to her normal status but felt very weak afterwards.  This last about 20 minutes until she was transported to the ED.  There was concern for hypoglycemia with glucose monitor reading glucose in the 20s but this was thought to be an error and on frequent rechecks it was normal.  Upon arrival in ED patient states that she feels much better and back to her normal self.  She does endorse some nausea but denies any vomiting, headache, change in vision, fevers, chills, recent illness.     Nursing Notes were all reviewed and agreed with or any disagreements were addressed in the HPI.     REVIEW OF SYSTEMS        Positives and Pertinent negatives as per HPI.    PAST HISTORY     Past Medical History:  Past

## 2024-03-26 NOTE — PLAN OF CARE
Speech LAnguage Pathology EVALUATION/DISCHARGE    Patient: Kamilla Lantigua (89 y.o. female)  Date: 3/26/2024  Primary Diagnosis: Paroxysmal atrial fibrillation (HCC) [I48.0]  Near syncope [R55]  Acute respiratory failure with hypoxia (HCC) [J96.01]  Acute hypoxemic respiratory failure (HCC) [J96.01]       Precautions:  Isolation (airborne)                  ASSESSMENT :  Based on the objective data described below, the patient presents with seemingly functional oropharyngeal swallow function. Oral mechanism revealed edentulous status with patient reports of wearing upper dentures. SLP rinsed and presented upper dentures to patient. Patient independently placed in oral cavity. Patient independently fed herself sips of thin via straw and bites of solid. No overt clinical s/s aspiration noted across PO trials. Upper dentures noted with some loose fit and occasionally moving around. However, patient was successful at talking and eating with upper dentures in place. No further acute SLP services warranted at this time. SLP will sign off. Please re-consult if concerns arise in the future.    Patient will be discharged from skilled speech-language pathology services at this time.     PLAN :  Recommendations and Planned Interventions:  Diet: Regular and thin liquids  --Medications as tolerated   --Upright all PO intake   --Oral hygiene 2-3x/day       Acute SLP Services: No, patient will be discharged from acute skilled speech-language pathology at this time.    Discharge Recommendations: No, additional SLP treatment not indicated at discharge     SUBJECTIVE:   Patient stated, “Do you have other people to see? Come back and see me.”    OBJECTIVE:     Past Medical History:   Diagnosis Date    Arrhythmia     PALPITATIONS    Bronchitis     Chronic pain     RT. SHOULDER    Diabetes (HCC)     TYPE II    History of blood transfusion 1960'S    AYAKA DE LEON, NO REACTION     Past Surgical History:   Procedure Laterality Date     DILATION AND CURETTAGE OF UTERUS  1960'S    HIP SURGERY Right 8/22/2023    HIP HEMIARTHROPLASTY performed by Darren Davis DO at [facility] MAIN OR    HYSTERECTOMY (CERVIX STATUS UNKNOWN)  1984    TONSILLECTOMY  1956    UROLOGICAL SURGERY  2009    BLADDER TUCK     Prior Level of Function/Home Situation:   Social/Functional History  Lives With: Spouse  Type of Home: House  Home Layout: One level  Home Access: Ramped entrance  Bathroom Shower/Tub: Walk-in shower, Shower chair with back  Bathroom Toilet: Handicap height  Bathroom Equipment: Grab bars in shower, 3-in-1 commode  Home Equipment: Walker, rolling, Rollator, Wheelchair-manual, Cane    Baseline Assessment:  Current Diet : Regular  Current Liquid Diet : Thin  Prior Dysphagia History: no previous SLP services documented  Patient Complaint: none    Cognitive and Communication Status:  Neurologic State: Alert  Orientation Level: Oriented to person and Oriented to place  Cognition: Follows commands and Decreased attention/concentration    Dysphagia:  Oral Assessment:  Oral Motor   Labial: No impairment  Dentition: Upper dentures  Oral Hygiene: Clean  Lingual: No impairment  Velum: Unable to visualize  P.O. Trials:  PO Trials  Assessment Method(s): Observation  Patient Position: upright sitting edge of bed  Vocal Quality: No Impairment  Consistency Presented: Thin;Regular  How Presented: Self-fed/presented;Straw  Bolus Acceptance: No impairment  Bolus Formation/Control: No impairment  Oral Residue: None  Aspiration Signs/Symptoms: None  Pharyngeal Phase Characteristics: No impairment, issues, or problems            Motor Speech:         Language Comprehension and Expression:                   Neuro-Linguistics:                      Voice:       Respiratory Status/Airway:  Nasal cannula, 1.5 LPM                           Functional Oral Intake Scale (FOIS): 7--Total oral diet with no restrictions    After treatment:   Patient left in no apparent distress in bed, Call

## 2024-03-26 NOTE — PROGRESS NOTES
Bedside shift report giving to RICHARD [place], report included SBAR, recent findings and lab. Patient had no complaint of pain this shift. Patient heart rate has dropped down to 20's -30's will napping throughout the day. Son would like for us to monitor O2 at night so we can determine how much oxygen would be needed while patient is sleeping MD made aware. Nightshift nurse will monitor O2.

## 2024-03-26 NOTE — PROGRESS NOTES
PHYSICAL THERAPY EVALUATION/DISCHARGE    Patient: Kamilla Lantigua (89 y.o. female)  Date: 3/26/2024  Primary Diagnosis: Paroxysmal atrial fibrillation (HCC) [I48.0]  Near syncope [R55]  Acute respiratory failure with hypoxia (HCC) [J96.01]  Acute hypoxemic respiratory failure (HCC) [J96.01]       Precautions: Isolation (airborne)                      ASSESSMENT AND RECOMMENDATIONS:  Based on the objective data below, the patient presents with decreased tolerance of activity and requiring supplemental 02, but is likely close to functional baseline.  Patient received sitting EOB with  present in room.  Demonstrates transfers at supervision level and was able to ambulate in room x approx 30 feet.  Gait with decreased royce and has tendency to occasionally touch wall etc to steady but no overt LOB.  Mobilized on RA with sats at 89% post activity so returned to 1.5 liters 02 and instructed in deep nasal breathing for recovery.  Sats quickly returned to 93%.  Recommend discharge home with HHPT to follow.    Functional Outcome Measure:  The patient scored 23/24 on the [facility] outcome measure           Further skilled acute physical therapy is not indicated at this time.       PLAN :  Recommendation for discharge: (in order for the patient to meet his/her long term goals): Therapy 2 days/week in the home and also see \"other factors to consider\" below for additional discharge concerns/needs    Other factors to consider for discharge:  02 needs, increased family assistance    IF patient discharges home will need the following DME: patient owns DME required for discharge       SUBJECTIVE:   Patient stated “I feel pretty much fine.”    OBJECTIVE DATA SUMMARY:     Past Medical History:   Diagnosis Date    Arrhythmia     PALPITATIONS    Bronchitis     Chronic pain     RT. SHOULDER    Diabetes (HCC)     TYPE II    History of blood transfusion 1960'S    AYAKA DE LEON, NO REACTION     Past Surgical History:   Procedure

## 2024-03-26 NOTE — PROGRESS NOTES
OCCUPATIONAL THERAPY EVALUATION/DISCHARGE  Patient: Kamilla Lantigua (89 y.o. female)  Date: 3/26/2024  Primary Diagnosis: Paroxysmal atrial fibrillation (HCC) [I48.0]  Near syncope [R55]  Acute respiratory failure with hypoxia (HCC) [J96.01]  Acute hypoxemic respiratory failure (HCC) [J96.01]         Precautions: Isolation (airborne)                  ASSESSMENT :  Based on the objective data below, the patient is functioning close to her baseline for ADLs and functional mobility. She was received sitting EOB on 1.5L O2 with  present in room and cleared for therapy by nursing. Patient completed all transfers/mobility with supervision and ADLs with mod I to supervision, tolerating well. She was placed on RA for session with SpO2 dropping to 89% with activity. She walked into bathroom and demonstrated toilet transfer/grooming at sink. Cues provided for sequencing hand washing but she was able to complete without physical assist. Patient declined sitting in recliner chair at end of session and requested to continue sitting EOB. Patient placed back on 1.5L O2 with SpO2 recovering to 93%. She was left sitting EOB with all needs met, VSS, and  present in room. She does not have any additional acute OT needs and does not require OT services at discharge.     Functional Outcome Measure:  The patient scored 24/24 on the [facility] outcome measure which is indicative of patient is functioning at her baseline for ADLs.      Further skilled acute occupational therapy is not indicated at this time.     PLAN :    Recommendation for discharge: (in order for the patient to meet his/her long term goals): No skilled occupational therapy    Other factors to consider for discharge: no additional factors    IF patient discharges home will need the following DME: patient owns DME required for discharge     SUBJECTIVE:   Patient stated, “I've been walking to the bathroom.”    OBJECTIVE DATA SUMMARY:     Past Medical History:

## 2024-03-26 NOTE — PROGRESS NOTES
Non billable note    Patient seen and examined at bedside.   Labs, charts and imaging reviewed.   Consult notes appreciated.     Admitted for Altered mental status, UTI, Acute hypoxemic respiratory failure , Possible MAC  -Updated son and  about patient medical condition   -As per them she has multiple episodes of pneumonia and has been treated multiple times.     -Orthostatic vital negative   -COVID and flu negative   -Echo from 1/11/24-Normal left ventricular systolic function with a visually estimated EF of 60 - 65%. Left ventricle size is normal. Normal wall thickness. Normal wall motion.    Mitral Valve: Moderately calcified leaflet, at the anterior and posterior leaflets. Mild regurgitation.    -Last admission on Jan,2024 was for COVID and flu positive. At that time -Pulmonary evaluated for possible MAC and sputum cx and AFB ordered however pt was unable to produce sputum. Advised to follow up as outpatient.   -Son had noticed hypoxia during sleep however never had sleep study prior. No prior h/o sleep apnea.   He is requesting oxygen on discharge.   -Plan to assess need of oxygen upon discharge.     Agree with management plan of admitting team   Continue with antibiotics   Assess oxygen need   Continue with nebulization  Monitor for hypoglycemia   Check AFB for MAC  Speech evaluation for possible dysphagia.

## 2024-03-26 NOTE — CONSULTS
Pulmonary, Critical Care, and Sleep Medicine~Consult Note    Name: Kamilla Lantigua MRN: [id number]   : 1935 Hospital: [facility]   Date: 3/26/2024 9:44 AM Admission: 3/25/2024     IMPRESSION:   Admitted w/ complaints of AMS/weakness, resolved  Abnormal CT chest w/ findings suspicious for MAC, bronchiectasis - not new  C/O dysphagia      PLAN:   Wean off o2, assess for home needs if unable to wean  Being treated for UTI  Can check AFB if able to produce sputum - does not need airborne isolation - eval for MAC not TB  Scheduled nebs, mucinex - to be continued at home  Speech eval  Thank you for the consultation     Daily Progression:    88yo female nonsmoker w/ history of DM, bronchiectasis. Admitted 3/25/24 for concerns of feeling \"funny\". Difficulty elaborating. No LOC. Per notes was hypoglycemic. Minimal cough. No fevers. No sick contacts. The feeling subsided in route to ED.  states she has episodes of severe cough, sometimes after eating. Hospital records reviewed. Hospitalized 2024 w/ multiple viral infections : flu A, parainfluenza, COVID. Treated w/ Tamiflu, baricitinib, dexamethasone. CT w/ concerns at that time for MAC. AFB was ordered but never sent. Discussed that findings were minimal and even if they represented MAC, doubt that treatment would be recommended. Given prednisone taper d/c, nebs. She has not followed up in our office.     Sitting up on side of bed.  at bedside. Feels better. On 2LPM. Afebrile.   , pBNP 142, WBC 7.7,   COVID/Flu negative.   CTA Chest 3/25/24 : No pulmonary embolus. Tree-in-bud nodularity with associated bronchiectasis and airway plugging in the right middle lobe and lingula suspicious for infection such as BOO. Incidentals as above including coronary artery disease.  CTA 2024 : Bronchiectasis and associated pericardial thickening and reticular nodular opacities in the lingula and right middle     glucagon injection 1 mg  1 mg SubCUTAneous PRN    dextrose 10 % infusion   IntraVENous Continuous PRN    sodium chloride flush 0.9 % injection 5-40 mL  5-40 mL IntraVENous 2 times per day    sodium chloride flush 0.9 % injection 5-40 mL  5-40 mL IntraVENous PRN    0.9 % sodium chloride infusion   IntraVENous PRN    enoxaparin (LOVENOX) injection 40 mg  40 mg SubCUTAneous Daily    ondansetron (ZOFRAN-ODT) disintegrating tablet 4 mg  4 mg Oral Q8H PRN    Or    ondansetron (ZOFRAN) injection 4 mg  4 mg IntraVENous Q6H PRN    polyethylene glycol (GLYCOLAX) packet 17 g  17 g Oral Daily PRN    acetaminophen (TYLENOL) tablet 650 mg  650 mg Oral Q6H PRN    Or    acetaminophen (TYLENOL) suppository 650 mg  650 mg Rectal Q6H PRN    melatonin tablet 3 mg  3 mg Oral Nightly    lactated ringers IV soln infusion   IntraVENous Continuous       Labs:  ABG Invalid input(s): \"PHI\", \"PCO2I\", \"PO2I\", \"HCO3I\", \"SO2I\", \"FIO2I\"     CBC Recent Labs     03/25/24 1956   WBC 7.7   HGB 11.8   HCT 38.0      MCV 91.3   MCH 28.4        Metabolic  Panel Recent Labs     03/25/24 1956   *   K 4.2   CL 96*   CO2 33*   BUN 15   MG 1.7   ALT 13        Pertinent Labs                Edie Sanchez, APRN - NP  3/26/2024

## 2024-03-26 NOTE — CARE COORDINATION
Care Management Initial Assessment       RUR:   Readmission? No  1st IM letter given? Yes - 3/25  1st  letter given: No    Chart reviewed. Patient discussed in rounds and not discharging today.    CM met with son, as patient on respiratory isolation. He states that patient lives with her  and they have been managing well. Her  is very Council, so will have trouble hearing any conversation. He lives with parents and provides support, but they are pretty much independent. His father still drives. No discharge needs identified.    CM will continue to follow.      03/26/24 3292   Service Assessment   Patient Orientation Alert and Oriented   Cognition Alert   History Provided By Child/Family  (Barry Christianson, son, [phone])   Primary Caregiver Self   Support Systems Spouse/Significant Other;Children   Patient's Healthcare Decision Maker is: Patient Declined (Legal Next of Kin Remains as Decision Maker)   PCP Verified by CM Yes   Prior Functional Level Independent in ADLs/IADLs   Current Functional Level Independent in ADLs/IADLs   Can patient return to prior living arrangement Yes   Family able to assist with home care needs: Yes   Would you like for me to discuss the discharge plan with any other family members/significant others, and if so, who? Yes  (son, )   Financial Resources Medicare   Community Resources None   Social/Functional History   Lives With Spouse;Son   Type of Home House   Home Equipment None;Walker, rolling;Wheelchair-manual;Rollator;Cane   Active  Yes   Discharge Planning   Type of Residence House   Living Arrangements Spouse/Significant Other;Children   Current Services Prior To Admission None   DME Ordered? No   Patient expects to be discharged to: Marcelo Wheatley, RICHARD  Care Manager  [phone]

## 2024-03-26 NOTE — H&P
Hospitalist Admission Note    NAME:  Kamilla Lantigua   :  1935   MRN:  [id number]     Date/Time:  3/26/2024 1:05 AM    Patient PCP: No primary care provider on file.    ______________________________________________________________________  Given the patient's current clinical presentation, I have a high level of concern for decompensation if discharged from the emergency department.  Complex decision making was performed, which includes reviewing the patient's available past medical records, laboratory results, and x-ray films.       My assessment of this patient's clinical condition and my plan of care is as follows.    Assessment / Plan:    Active Problems:  AMS-possibly due to hypoglycemia versus toxic encephalopathy  UTI  Acute hypoxemic respiratory failure  Radiographic evidence of mucous plugging  Concern for BOO  Paroxysmal atrial fibrillation not on anticoagulation  Diabetes mellitus    Plan:  AMS-possibly due to hypoglycemia versus toxic encephalopathy  Admit to telemetry monitoring  Appears near baseline mental status  -Unclear if patient had unresponsive episode versus loss of consciousness versus transient encephalopathy    UTI  Empiric ceftriaxone x 5 days  Follow urine culture and adjust as indicated    Acute hypoxemic respiratory failure  Radiographic evidence of mucous plugging  Concern for BOO  Continuous pulse oximetry with supplemental oxygen as needed maintain saturations greater than 90%  Pulmonary toilet  Serial AFB  -RT consulted to induce sputum  Pulmonology consulted, greatly appreciate their expertise    Paroxysmal atrial fibrillation not on anticoagulation  Continue PTA digoxin  Check digoxin level    Diabetes mellitus  Corrective coverage insulin  Accu-Cheks  Diabetic diet  Hypoglycemia protocol in place    Medical Decision Making:   I personally reviewed labs: Yes, as listed below  I personally reviewed imaging: CT head, CTA chest, CXR  Toxic drug monitoring:  this  level.    THYROID: No nodule.  MEDIASTINUM: No mass or lymphadenopathy.  CHRISTIANO: No mass or lymphadenopathy.  THORACIC AORTA: Atherosclerotic calcification without aneurysm or dissection.  HEART: The heart is normal in size without pericardial effusion. Coronary artery  calcifications are noted.  ESOPHAGUS: No wall thickening or dilatation.  TRACHEA/BRONCHI: Patent.  PLEURA: No effusion or pneumothorax.  LUNGS: Small tree in bud nodularity of the right middle lobe and lingula with  small area of bronchiectasis and airway plugging in the peripheral lingula and  right middle lobe. Lungs are otherwise clear.  UPPER ABDOMEN: Partially imaged. No acute pathology.  BONES: Degenerative spine change with no aggressive bone lesion or fracture.    Impression  No pulmonary embolus. Tree-in-bud nodularity with associated  bronchiectasis and airway plugging in the right middle lobe and lingula  suspicious for infection such as BOO. Incidentals as above including coronary  artery disease.        Xray Result (most recent):  XR CHEST PORTABLE 03/25/2024    Narrative  EXAM:  XR CHEST PORTABLE    Clinical history: Syncope  INDICATION:   Syncope  COMPARISON: 1/8/2024    FINDINGS:  AP portable upright view of the chest demonstrates a stable  cardiopericardial  silhouette.There is no pleural effusion.There is no focal consolidation.There is  no pneumothorax.    Impression  No acute intrathoracic process is identified.        _______________________________________________________________________    TOTAL TIME:  75 Minutes   TOBACCO CESSATION COUNSELING: No    Critical Care Provided: N/A  (Minutes non procedure based)        Signed:Dwain Tavares DO  [facility] Hospitalist/ Nocturnist  3/26/2024 1:05 AM    Please do not hesitate to reach out to myself or assigned provider on-call via [employer] system with questions or concerns.     Procedures: see electronic medical records for all procedures/Xrays and

## 2024-03-27 VITALS
OXYGEN SATURATION: 95 % | SYSTOLIC BLOOD PRESSURE: 149 MMHG | WEIGHT: 132.5 LBS | HEART RATE: 66 BPM | RESPIRATION RATE: 18 BRPM | DIASTOLIC BLOOD PRESSURE: 52 MMHG | TEMPERATURE: 98.7 F | HEIGHT: 64 IN | BODY MASS INDEX: 22.62 KG/M2

## 2024-03-27 LAB
ANION GAP SERPL CALC-SCNC: 3 MMOL/L (ref 5–15)
BACTERIA SPEC CULT: NORMAL
BASOPHILS # BLD: 0 K/UL (ref 0–0.1)
BASOPHILS NFR BLD: 1 % (ref 0–1)
BUN SERPL-MCNC: 9 MG/DL (ref 6–20)
BUN/CREAT SERPL: 15 (ref 12–20)
CALCIUM SERPL-MCNC: 8.8 MG/DL (ref 8.5–10.1)
CC UR VC: NORMAL
CHLORIDE SERPL-SCNC: 99 MMOL/L (ref 97–108)
CO2 SERPL-SCNC: 35 MMOL/L (ref 21–32)
CREAT SERPL-MCNC: 0.59 MG/DL (ref 0.55–1.02)
DIFFERENTIAL METHOD BLD: ABNORMAL
EKG ATRIAL RATE: 76 BPM
EKG DIAGNOSIS: NORMAL
EKG Q-T INTERVAL: 378 MS
EKG QRS DURATION: 74 MS
EKG QTC CALCULATION (BAZETT): 425 MS
EKG R AXIS: 8 DEGREES
EKG T AXIS: 75 DEGREES
EKG VENTRICULAR RATE: 76 BPM
EOSINOPHIL # BLD: 0.1 K/UL (ref 0–0.4)
EOSINOPHIL NFR BLD: 2 % (ref 0–7)
ERYTHROCYTE [DISTWIDTH] IN BLOOD BY AUTOMATED COUNT: 13.3 % (ref 11.5–14.5)
GLUCOSE BLD STRIP.AUTO-MCNC: 187 MG/DL (ref 65–117)
GLUCOSE BLD STRIP.AUTO-MCNC: 271 MG/DL (ref 65–117)
GLUCOSE SERPL-MCNC: 224 MG/DL (ref 65–100)
HCT VFR BLD AUTO: 33.1 % (ref 35–47)
HGB BLD-MCNC: 10.3 G/DL (ref 11.5–16)
IMM GRANULOCYTES # BLD AUTO: 0 K/UL (ref 0–0.04)
IMM GRANULOCYTES NFR BLD AUTO: 0 % (ref 0–0.5)
LYMPHOCYTES # BLD: 1.6 K/UL (ref 0.8–3.5)
LYMPHOCYTES NFR BLD: 27 % (ref 12–49)
MCH RBC QN AUTO: 28.6 PG (ref 26–34)
MCHC RBC AUTO-ENTMCNC: 31.1 G/DL (ref 30–36.5)
MCV RBC AUTO: 91.9 FL (ref 80–99)
MONOCYTES # BLD: 0.7 K/UL (ref 0–1)
MONOCYTES NFR BLD: 11 % (ref 5–13)
NEUTS SEG # BLD: 3.6 K/UL (ref 1.8–8)
NEUTS SEG NFR BLD: 59 % (ref 32–75)
NRBC # BLD: 0 K/UL (ref 0–0.01)
NRBC BLD-RTO: 0 PER 100 WBC
PLATELET # BLD AUTO: 230 K/UL (ref 150–400)
PMV BLD AUTO: 9.1 FL (ref 8.9–12.9)
POTASSIUM SERPL-SCNC: 4.1 MMOL/L (ref 3.5–5.1)
RBC # BLD AUTO: 3.6 M/UL (ref 3.8–5.2)
SERVICE CMNT-IMP: ABNORMAL
SERVICE CMNT-IMP: ABNORMAL
SERVICE CMNT-IMP: NORMAL
SODIUM SERPL-SCNC: 137 MMOL/L (ref 136–145)
WBC # BLD AUTO: 6 K/UL (ref 3.6–11)

## 2024-03-27 PROCEDURE — 6370000000 HC RX 637 (ALT 250 FOR IP): Performed by: NURSE PRACTITIONER

## 2024-03-27 PROCEDURE — 2700000000 HC OXYGEN THERAPY PER DAY

## 2024-03-27 PROCEDURE — 82962 GLUCOSE BLOOD TEST: CPT

## 2024-03-27 PROCEDURE — 6370000000 HC RX 637 (ALT 250 FOR IP): Performed by: STUDENT IN AN ORGANIZED HEALTH CARE EDUCATION/TRAINING PROGRAM

## 2024-03-27 PROCEDURE — 2580000003 HC RX 258: Performed by: STUDENT IN AN ORGANIZED HEALTH CARE EDUCATION/TRAINING PROGRAM

## 2024-03-27 PROCEDURE — 6360000002 HC RX W HCPCS: Performed by: STUDENT IN AN ORGANIZED HEALTH CARE EDUCATION/TRAINING PROGRAM

## 2024-03-27 PROCEDURE — 94760 N-INVAS EAR/PLS OXIMETRY 1: CPT

## 2024-03-27 PROCEDURE — 80048 BASIC METABOLIC PNL TOTAL CA: CPT

## 2024-03-27 PROCEDURE — 94761 N-INVAS EAR/PLS OXIMETRY MLT: CPT

## 2024-03-27 PROCEDURE — 36415 COLL VENOUS BLD VENIPUNCTURE: CPT

## 2024-03-27 PROCEDURE — 94640 AIRWAY INHALATION TREATMENT: CPT

## 2024-03-27 PROCEDURE — 85025 COMPLETE CBC W/AUTO DIFF WBC: CPT

## 2024-03-27 RX ADMIN — DIGOXIN 0.12 MG: 125 TABLET ORAL at 09:09

## 2024-03-27 RX ADMIN — GUAIFENESIN 600 MG: 600 TABLET, EXTENDED RELEASE ORAL at 09:08

## 2024-03-27 RX ADMIN — INSULIN LISPRO 4 UNITS: 100 INJECTION, SOLUTION INTRAVENOUS; SUBCUTANEOUS at 14:41

## 2024-03-27 RX ADMIN — SODIUM CHLORIDE, PRESERVATIVE FREE 10 ML: 5 INJECTION INTRAVENOUS at 09:09

## 2024-03-27 RX ADMIN — IPRATROPIUM BROMIDE AND ALBUTEROL SULFATE 1 DOSE: .5; 3 SOLUTION RESPIRATORY (INHALATION) at 09:22

## 2024-03-27 RX ADMIN — SODIUM CHLORIDE 1000 MG: 900 INJECTION INTRAVENOUS at 01:01

## 2024-03-27 RX ADMIN — ENOXAPARIN SODIUM 40 MG: 100 INJECTION SUBCUTANEOUS at 09:09

## 2024-03-27 ASSESSMENT — PAIN SCALES - GENERAL: PAINLEVEL_OUTOF10: 0

## 2024-03-27 NOTE — DISCHARGE SUMMARY
Discharge Summary    Name: Kamilla Lantigua  [id number]  YOB: 1935 (Age: 89 y.o.)   Date of Admission: 3/25/2024  Date of Discharge: 3/27/2024  Attending Physician: Ary Crum MD    Discharge Diagnosis:   Altered mental status  UTI ruled out  Acute hypoxia  Diabetes mellitus  Paroxysmal A-fib  Consultations:  IP CONSULT TO PULMONOLOGY  IP CONSULT TO CASE MANAGEMENT      Brief Admission History/Reason for Admission Per Dwain Tavares, DO:   Kamilla Lantigua is a 89 y.o.  female with PMHx as listed below presenting to the emergency department after episode of altered mentation versus near syncope.  Patient confused regarding events and history provided by  all she is able to tell me that she felt \"funny.\"  Unable to describe further.   describes episode of eyes rolling back in her head with approximately 5 seconds of unresponsiveness although she remained seated without falling forward and without convulsive episode/activity witnessed.  She does not believe she lost consciousness and feels like she had awareness during this episode.  Had immediate return to baseline mentation, but felt profoundly weak which reportedly lasted 20 minutes and then resolved.  Family did check blood sugar at that time with initial reading of 29 which was immediately rechecked with a reading of 99 and normal blood sugar reading with EMS likely indicating spurious initial read.  ROS otherwise negative.     In the ED, patient afebrile and hemodynamically stable saturating upper 80s on room air improved to mid 90s on 2 L/min via nasal cannula.  ECG demonstrates atrial fibrillation without definitive ischemic change (now back in sinus rhythm on bedside telemetry), CT head negative for acute process, CXR negative for acute process, CTA chest demonstrating tree-in-bud nodularity with associated bronchiectasis and airway plugging of right middle lobe and lingula suspicious for  infection such as BOO without other acute process noted.  COVID and flu negative.  Labs demonstrate: UA reflex to culture, CBC grossly unremarkable,-troponin 6, magnesium 1.7, , sodium 133, potassium 4.2, glucose 174, BUN 15, creatinine 0.57, LFTs grossly unremarkable.     We were asked to admit for work up and evaluation of the above problems.     Brief Hospital Course by Main Problems:   Altered mental status  UTI ruled out  Acute hypoxia  -Transient altered mental status, initial blood sugar 29 at home which on recheck was 99.  Mentation improved to baseline.  Initially requiring oxygen currently weaned off  Ambulatory pulse ox 93% on room air  Some desaturation noted during nighttime,  ?  May require night oxygen   to send it to see whether patient qualifies for home oxygen for night  Discussed with patient and  at bedside.  They do not want to stay another day, will follow-up pulmonology and may do pulse oximetry as outpatient    -CT chest showing tree-in-bud opacities suspicious for MAC, bronchiectasis not new  Discussed with pulmonology.  No need for antibiotics  Continuous nebs and Mucinex as outpatient  -Unable to get any sputum  Speech evaluation regular food  -Urine culture mixed luan.  Was initially empirically on ceftriaxone which was discontinued on discharge    Paroxysmal A-fib  Continue digoxin  Diabetes mellitus continue home regimen    Discharge Exam:  Patient seen and examined by me on discharge day.  Pertinent Findings:  Patient Vitals for the past 24 hrs:   BP Temp Temp src Pulse Resp SpO2   03/27/24 0922 -- -- -- -- -- 95 %   03/27/24 0729 (!) 149/52 98.7 °F (37.1 °C) Oral 66 18 94 %   03/27/24 0416 (!) 139/56 98.5 °F (36.9 °C) Oral 65 17 98 %   03/27/24 0117 -- -- -- 69 -- 93 %   03/26/24 2022 (!) 143/59 97.5 °F (36.4 °C) Oral 75 16 99 %   03/26/24 1616 136/66 97.9 °F (36.6 °C) Oral 67 24 100 %       Gen:    Not in distress  Chest: Clear lungs  CVS:   Regular rhythm.

## 2024-03-27 NOTE — PROGRESS NOTES
Patient discharged home via family transportation. IV and telemetry removed. AVS provided and patient had no questions or concerns. Patient education provided to patient to avoid flammable products, no smoking in the home, oxygen to stand up right etc. Patient also advised to get pulse ox to monitor O2 saturations.

## 2024-03-27 NOTE — PROGRESS NOTES
Pulmonary, Critical Care, and Sleep Medicine~Consult Note    Name: Kamilla Lantigua MRN: [id number]   : 1935 Hospital: [facility]   Date: 3/27/2024 8:53 AM Admission: 3/25/2024     IMPRESSION:   Admitted w/ complaints of AMS/weakness, resolved  Abnormal CT chest w/ findings suspicious for MAC, bronchiectasis - not new  C/O dysphagia      PLAN:   Wean off o2, assess for home needs if unable to wean  Being treated for UTI  Can check AFB if able to produce sputum - does not need airborne isolation - eval for MAC not TB  Scheduled nebs, mucinex - to be continued at home  Speech eval  Thank you for the consultation - d/w hospitalist  Can arrange outpt oox      Daily Progression:    3/27/24 : Feels much better today. No current complaints.     89yo female nonsmoker w/ history of DM, bronchiectasis. Admitted 3/25/24 for concerns of feeling \"funny\". Difficulty elaborating. No LOC. Per notes was hypoglycemic. Minimal cough. No fevers. No sick contacts. The feeling subsided in route to ED.  states she has episodes of severe cough, sometimes after eating. Hospital records reviewed. Hospitalized 2024 w/ multiple viral infections : flu A, parainfluenza, COVID. Treated w/ Tamiflu, baricitinib, dexamethasone. CT w/ concerns at that time for MAC. AFB was ordered but never sent. Discussed that findings were minimal and even if they represented MAC, doubt that treatment would be recommended. Given prednisone taper d/c, nebs. She has not followed up in our office.     Sitting up on side of bed.  at bedside. Feels better. On 2LPM. Afebrile.   , pBNP 142, WBC 7.7,   COVID/Flu negative.   CTA Chest 3/25/24 : No pulmonary embolus. Tree-in-bud nodularity with associated bronchiectasis and airway plugging in the right middle lobe and lingula suspicious for infection such as BOO. Incidentals as above including coronary artery disease.  CTA 2024 : Bronchiectasis

## 2024-03-27 NOTE — PROGRESS NOTES
Pulse oximetry assessment   94% at rest on room air (if 88% or less, skip next steps)  93% while ambulating on room air  97% at rest on 0.5 LPM  98% while ambulating on 0.5LPM      While sleeping last night nurse observed 84% one time and 88% another.

## 2024-03-27 NOTE — PROGRESS NOTES
Bedside shift report given to Kala RAMOS, report included SBAR,recent findings and labs.The patient was saturating at 94 percent on  0.5 liters and 100 percent on 1 litre of oxygen , desaturating to 84 on room air.  The patient is resting in bed no complaints raised.

## 2024-03-27 NOTE — CARE COORDINATION
Pt is cleared for d/c from a CM standpoint.     Transition of Care Plan:    RUR: 17%   Prior Level of Functioning: independent  Disposition: home with spouse  If SNF or IPR: Date FOC offered:   Date FOC received:   Accepting facility:   Date authorization started with reference number:   Date authorization received and expires:   Follow up appointments: PCP, Specialists  DME needed: home o2   Transportation at discharge: spouse  IM/IMM Medicare/ letter given: provided  Is patient a  and connected with [facility]?    If yes, was [place] transfer form completed and [facility] notified?   Caregiver Contact:     Barry Lantigua (Spouse)  [phone] (Home Phone)     Discharge Caregiver contacted prior to discharge?   Care Conference needed?   Barriers to discharge:     2:57 p.m. CM spoke with Ok Espinal of [employer].  Ok will deliver o2 to pt's home later this afternoon.  Pt is currently off o2 so Ok will deliver to the house so she will have this evening. CM will make follow up appt with [facility].     CM met with pt at bedside.  2IM notice explained and signed copy placed on bedside chart.  Pt's spouse is here to transport pt home.  CM informed pt needs home o2 at night.  CM will send order to determine if pt qualifies.         03/27/24 2225   Services At/After Discharge   Transition of Care Consult (CM Consult) Discharge Planning   Services At/After Discharge None   Mode of Transport at Discharge Other (see comment)  (spouse)           Alina Baires LMSW  Supervisee in Social Work  Care Management, [facility]  [phone]

## 2024-11-26 ENCOUNTER — HOSPITAL ENCOUNTER (INPATIENT)
Facility: HOSPITAL | Age: 89
LOS: 2 days | Discharge: HOME | End: 2024-11-29
Attending: STUDENT IN AN ORGANIZED HEALTH CARE EDUCATION/TRAINING PROGRAM | Admitting: INTERNAL MEDICINE
Payer: MEDICARE

## 2024-11-26 ENCOUNTER — APPOINTMENT (OUTPATIENT)
Facility: HOSPITAL | Age: 89
End: 2024-11-26
Payer: MEDICARE

## 2024-11-26 DIAGNOSIS — J96.21 ACUTE ON CHRONIC RESPIRATORY FAILURE WITH HYPOXIA: ICD-10-CM

## 2024-11-26 DIAGNOSIS — I26.93 SINGLE SUBSEGMENTAL PULMONARY EMBOLISM WITHOUT ACUTE COR PULMONALE (HCC): Primary | ICD-10-CM

## 2024-11-26 LAB
ALBUMIN SERPL-MCNC: 2.4 G/DL (ref 3.5–5)
ALBUMIN/GLOB SERPL: 0.5 (ref 1.1–2.2)
ALP SERPL-CCNC: 99 U/L (ref 45–117)
ALT SERPL-CCNC: 10 U/L (ref 12–78)
ANION GAP BLD CALC-SCNC: ABNORMAL (ref 10–20)
ANION GAP SERPL CALC-SCNC: 4 MMOL/L (ref 2–12)
AST SERPL-CCNC: 20 U/L (ref 15–37)
BASE EXCESS BLD CALC-SCNC: 11 MMOL/L
BASOPHILS # BLD: 0.1 K/UL (ref 0–0.1)
BASOPHILS NFR BLD: 0 % (ref 0–1)
BILIRUB SERPL-MCNC: 0.5 MG/DL (ref 0.2–1)
BUN SERPL-MCNC: 11 MG/DL (ref 6–20)
BUN/CREAT SERPL: 22 (ref 12–20)
CALCIUM SERPL-MCNC: 8.7 MG/DL (ref 8.5–10.1)
CHLORIDE SERPL-SCNC: 95 MMOL/L (ref 97–108)
CO2 SERPL-SCNC: 32 MMOL/L (ref 21–32)
CREAT SERPL-MCNC: 0.51 MG/DL (ref 0.55–1.02)
DIFFERENTIAL METHOD BLD: ABNORMAL
EOSINOPHIL # BLD: 0 K/UL (ref 0–0.4)
EOSINOPHIL NFR BLD: 0 % (ref 0–7)
ERYTHROCYTE [DISTWIDTH] IN BLOOD BY AUTOMATED COUNT: 13.3 % (ref 11.5–14.5)
FLUAV RNA SPEC QL NAA+PROBE: NOT DETECTED
FLUBV RNA SPEC QL NAA+PROBE: NOT DETECTED
GLOBULIN SER CALC-MCNC: 4.8 G/DL (ref 2–4)
GLUCOSE BLD STRIP.AUTO-MCNC: 271 MG/DL (ref 65–117)
GLUCOSE SERPL-MCNC: 274 MG/DL (ref 65–100)
HCO3 BLDA-SCNC: 42 MMOL/L
HCT VFR BLD AUTO: 31.5 % (ref 35–47)
HGB BLD-MCNC: 9.8 G/DL (ref 11.5–16)
IMM GRANULOCYTES # BLD AUTO: 0.2 K/UL (ref 0–0.04)
IMM GRANULOCYTES NFR BLD AUTO: 1 % (ref 0–0.5)
LACTATE BLD-SCNC: 0.94 MMOL/L (ref 0.4–2)
LYMPHOCYTES # BLD: 1.4 K/UL (ref 0.8–3.5)
LYMPHOCYTES NFR BLD: 10 % (ref 12–49)
MCH RBC QN AUTO: 28.9 PG (ref 26–34)
MCHC RBC AUTO-ENTMCNC: 31.1 G/DL (ref 30–36.5)
MCV RBC AUTO: 92.9 FL (ref 80–99)
MONOCYTES # BLD: 0.8 K/UL (ref 0–1)
MONOCYTES NFR BLD: 6 % (ref 5–13)
NEUTS SEG # BLD: 11.5 K/UL (ref 1.8–8)
NEUTS SEG NFR BLD: 82 % (ref 32–75)
NRBC # BLD: 0 K/UL (ref 0–0.01)
NRBC BLD-RTO: 0 PER 100 WBC
NT PRO BNP: 1401 PG/ML
PCO2 BLDV: 90.4 MMHG (ref 41–51)
PH BLDV: 7.27 (ref 7.32–7.42)
PLATELET # BLD AUTO: 394 K/UL (ref 150–400)
PO2 BLDV: 97 MMHG (ref 25–40)
POTASSIUM BLD-SCNC: 5.6 MMOL/L (ref 3.5–5.5)
POTASSIUM SERPL-SCNC: 5.1 MMOL/L (ref 3.5–5.1)
PROT SERPL-MCNC: 7.2 G/DL (ref 6.4–8.2)
RBC # BLD AUTO: 3.39 M/UL (ref 3.8–5.2)
SAO2 % BLD: 96 % (ref 94–98)
SARS-COV-2 RNA RESP QL NAA+PROBE: NOT DETECTED
SERVICE CMNT-IMP: ABNORMAL
SODIUM BLD-SCNC: 138 MMOL/L (ref 136–145)
SODIUM SERPL-SCNC: 131 MMOL/L (ref 136–145)
SOURCE: NORMAL
SPECIMEN SITE: ABNORMAL
TROPONIN I SERPL HS-MCNC: 14 NG/L (ref 0–51)
WBC # BLD AUTO: 14 K/UL (ref 3.6–11)

## 2024-11-26 PROCEDURE — 87636 SARSCOV2 & INF A&B AMP PRB: CPT

## 2024-11-26 PROCEDURE — 80053 COMPREHEN METABOLIC PANEL: CPT

## 2024-11-26 PROCEDURE — 70450 CT HEAD/BRAIN W/O DYE: CPT

## 2024-11-26 PROCEDURE — 84484 ASSAY OF TROPONIN QUANT: CPT

## 2024-11-26 PROCEDURE — 71045 X-RAY EXAM CHEST 1 VIEW: CPT

## 2024-11-26 PROCEDURE — 84295 ASSAY OF SERUM SODIUM: CPT

## 2024-11-26 PROCEDURE — 85025 COMPLETE CBC W/AUTO DIFF WBC: CPT

## 2024-11-26 PROCEDURE — 84132 ASSAY OF SERUM POTASSIUM: CPT

## 2024-11-26 PROCEDURE — 82330 ASSAY OF CALCIUM: CPT

## 2024-11-26 PROCEDURE — 36415 COLL VENOUS BLD VENIPUNCTURE: CPT

## 2024-11-26 PROCEDURE — 6360000004 HC RX CONTRAST MEDICATION: Performed by: RADIOLOGY

## 2024-11-26 PROCEDURE — 82962 GLUCOSE BLOOD TEST: CPT

## 2024-11-26 PROCEDURE — 82803 BLOOD GASES ANY COMBINATION: CPT

## 2024-11-26 PROCEDURE — 99285 EMERGENCY DEPT VISIT HI MDM: CPT

## 2024-11-26 PROCEDURE — 82947 ASSAY GLUCOSE BLOOD QUANT: CPT

## 2024-11-26 PROCEDURE — 71275 CT ANGIOGRAPHY CHEST: CPT

## 2024-11-26 PROCEDURE — 83880 ASSAY OF NATRIURETIC PEPTIDE: CPT

## 2024-11-26 PROCEDURE — 93005 ELECTROCARDIOGRAM TRACING: CPT | Performed by: STUDENT IN AN ORGANIZED HEALTH CARE EDUCATION/TRAINING PROGRAM

## 2024-11-26 RX ORDER — IOPAMIDOL 755 MG/ML
100 INJECTION, SOLUTION INTRAVASCULAR
Status: COMPLETED | OUTPATIENT
Start: 2024-11-26 | End: 2024-11-26

## 2024-11-26 RX ADMIN — IOPAMIDOL 60 ML: 755 INJECTION, SOLUTION INTRAVENOUS at 23:52

## 2024-11-27 PROBLEM — R40.0 SOMNOLENCE: Status: ACTIVE | Noted: 2024-11-27

## 2024-11-27 PROBLEM — J96.92 RESPIRATORY FAILURE WITH HYPOXIA AND HYPERCAPNIA, UNSPECIFIED CHRONICITY: Status: ACTIVE | Noted: 2024-11-27

## 2024-11-27 PROBLEM — Z71.89 GOALS OF CARE, COUNSELING/DISCUSSION: Status: ACTIVE | Noted: 2024-11-27

## 2024-11-27 PROBLEM — R54 FRAILTY: Status: ACTIVE | Noted: 2024-11-27

## 2024-11-27 PROBLEM — Z71.89 DNR (DO NOT RESUSCITATE) DISCUSSION: Status: ACTIVE | Noted: 2024-11-27

## 2024-11-27 PROBLEM — J96.01 ACUTE HYPOXIC RESPIRATORY FAILURE: Status: ACTIVE | Noted: 2024-11-27

## 2024-11-27 PROBLEM — J96.91 RESPIRATORY FAILURE WITH HYPOXIA AND HYPERCAPNIA, UNSPECIFIED CHRONICITY: Status: ACTIVE | Noted: 2024-11-27

## 2024-11-27 LAB
ANION GAP BLD CALC-SCNC: 5 (ref 10–20)
APTT PPP: 28.4 SEC (ref 22.1–31)
ARTERIAL PATENCY WRIST A: POSITIVE
ARTERIAL PATENCY WRIST A: POSITIVE
ARTERIAL PATENCY WRIST A: YES
B PERT DNA SPEC QL NAA+PROBE: NOT DETECTED
BASE EXCESS BLD CALC-SCNC: 6.1 MMOL/L
BASE EXCESS BLD CALC-SCNC: 7.1 MMOL/L
BASE EXCESS BLD CALC-SCNC: 7.6 MMOL/L
BASE EXCESS BLDA CALC-SCNC: 8.1 MMOL/L
BDY SITE: ABNORMAL
BORDETELLA PARAPERTUSSIS BY PCR: NOT DETECTED
C PNEUM DNA SPEC QL NAA+PROBE: NOT DETECTED
CA-I BLD-MCNC: 1.26 MMOL/L (ref 1.15–1.33)
CHLORIDE BLD-SCNC: 97 MMOL/L (ref 100–111)
CO2 BLD-SCNC: 38 MMOL/L (ref 22–29)
CREAT UR-MCNC: 0.5 MG/DL (ref 0.6–1.3)
DIGOXIN SERPL-MCNC: 0.4 NG/ML (ref 0.9–2)
EKG ATRIAL RATE: 87 BPM
EKG DIAGNOSIS: NORMAL
EKG P AXIS: 54 DEGREES
EKG P-R INTERVAL: 148 MS
EKG Q-T INTERVAL: 334 MS
EKG QRS DURATION: 76 MS
EKG QTC CALCULATION (BAZETT): 401 MS
EKG R AXIS: -13 DEGREES
EKG T AXIS: 42 DEGREES
EKG VENTRICULAR RATE: 87 BPM
ERYTHROCYTE [DISTWIDTH] IN BLOOD BY AUTOMATED COUNT: 13.1 % (ref 11.5–14.5)
FIO2 ON VENT: 30 %
FLUAV SUBTYP SPEC NAA+PROBE: NOT DETECTED
FLUBV RNA SPEC QL NAA+PROBE: NOT DETECTED
GAS FLOW.O2 O2 DELIVERY SYS: ABNORMAL
GAS FLOW.O2 O2 DELIVERY SYS: ABNORMAL
GAS FLOW.O2 SETTING OXYMISER: 16
GAS FLOW.O2 SETTING OXYMISER: 18 BPM
GAS FLOW.O2 SETTING OXYMISER: 20 BPM
GLUCOSE BLD STRIP.AUTO-MCNC: 129 MG/DL (ref 65–117)
GLUCOSE BLD STRIP.AUTO-MCNC: 143 MG/DL (ref 65–117)
GLUCOSE BLD STRIP.AUTO-MCNC: 173 MG/DL (ref 65–117)
GLUCOSE BLD STRIP.AUTO-MCNC: 296 MG/DL (ref 74–99)
GLUCOSE BLD STRIP.AUTO-MCNC: 304 MG/DL (ref 65–117)
HADV DNA SPEC QL NAA+PROBE: NOT DETECTED
HCO3 BLD-SCNC: 35.9 MMOL/L (ref 21–28)
HCO3 BLD-SCNC: 37.5 MMOL/L (ref 21–28)
HCO3 BLDA-SCNC: 37 MMOL/L
HCO3 BLDA-SCNC: 37 MMOL/L (ref 22–26)
HCOV 229E RNA SPEC QL NAA+PROBE: NOT DETECTED
HCOV HKU1 RNA SPEC QL NAA+PROBE: NOT DETECTED
HCOV NL63 RNA SPEC QL NAA+PROBE: NOT DETECTED
HCOV OC43 RNA SPEC QL NAA+PROBE: NOT DETECTED
HCT VFR BLD AUTO: 32.7 % (ref 35–47)
HGB BLD-MCNC: 10 G/DL (ref 11.5–16)
HMPV RNA SPEC QL NAA+PROBE: NOT DETECTED
HPIV1 RNA SPEC QL NAA+PROBE: NOT DETECTED
HPIV2 RNA SPEC QL NAA+PROBE: NOT DETECTED
HPIV3 RNA SPEC QL NAA+PROBE: NOT DETECTED
HPIV4 RNA SPEC QL NAA+PROBE: NOT DETECTED
INR PPP: 1.2 (ref 0.9–1.1)
INSPIRATION.DURATION SETTING TIME VENT: 1 SEC
IPAP/PIP/HIGH PEEP: 14
LACTATE BLD-SCNC: 0.83 MMOL/L (ref 0.4–2)
M PNEUMO DNA SPEC QL NAA+PROBE: NOT DETECTED
MCH RBC QN AUTO: 28.3 PG (ref 26–34)
MCHC RBC AUTO-ENTMCNC: 30.6 G/DL (ref 30–36.5)
MCV RBC AUTO: 92.6 FL (ref 80–99)
NRBC # BLD: 0 K/UL (ref 0–0.01)
NRBC BLD-RTO: 0 PER 100 WBC
O2/TOTAL GAS SETTING VFR VENT: 30 %
O2/TOTAL GAS SETTING VFR VENT: 40 %
PCO2 BLD: 77.9 MMHG (ref 35–48)
PCO2 BLD: 83.8 MMHG (ref 35–48)
PCO2 BLDA: 74 MMHG (ref 35–45)
PCO2 BLDV: 96.1 MMHG (ref 41–51)
PEEP RESPIRATORY: 6
PEEP RESPIRATORY: 6 CMH2O
PEEP RESPIRATORY: 6 CMH2O
PH BLD: 7.26 (ref 7.35–7.45)
PH BLD: 7.27 (ref 7.35–7.45)
PH BLDA: 7.32 (ref 7.35–7.45)
PH BLDV: 7.2 (ref 7.32–7.42)
PLATELET # BLD AUTO: 409 K/UL (ref 150–400)
PMV BLD AUTO: 8.6 FL (ref 8.9–12.9)
PO2 BLD: 133 MMHG (ref 83–108)
PO2 BLD: 79 MMHG (ref 83–108)
PO2 BLDA: 89 MMHG (ref 80–100)
PO2 BLDV: 42 MMHG (ref 25–40)
POTASSIUM BLD-SCNC: 4.5 MMOL/L (ref 3.5–5.5)
PROCALCITONIN SERPL-MCNC: 0.19 NG/ML
PROTHROMBIN TIME: 12 SEC (ref 9–11.1)
RBC # BLD AUTO: 3.53 M/UL (ref 3.8–5.2)
RSV RNA SPEC QL NAA+PROBE: NOT DETECTED
RV+EV RNA SPEC QL NAA+PROBE: NOT DETECTED
SAO2 % BLD: 63 % (ref 94–98)
SAO2 % BLD: 92.8 % (ref 92–97)
SAO2 % BLD: 96 % (ref 92–97)
SAO2 % BLD: 98.3 % (ref 92–97)
SAO2% DEVICE SAO2% SENSOR NAME: ABNORMAL
SARS-COV-2 RNA RESP QL NAA+PROBE: NOT DETECTED
SERVICE CMNT-IMP: ABNORMAL
SODIUM BLD-SCNC: 140 MMOL/L (ref 136–145)
SPECIMEN SITE: ABNORMAL
SPECIMEN SITE: ABNORMAL
SPECIMEN TYPE: ABNORMAL
SPECIMEN TYPE: ABNORMAL
THERAPEUTIC RANGE: NORMAL SECS (ref 58–77)
UFH PPP CHRO-ACNC: <0.1 IU/ML
VENTILATION MODE VENT: ABNORMAL
VT SETTING VENT: 360
VT SETTING VENT: 500 ML
WBC # BLD AUTO: 14 K/UL (ref 3.6–11)

## 2024-11-27 PROCEDURE — 84132 ASSAY OF SERUM POTASSIUM: CPT

## 2024-11-27 PROCEDURE — 94640 AIRWAY INHALATION TREATMENT: CPT

## 2024-11-27 PROCEDURE — 6370000000 HC RX 637 (ALT 250 FOR IP): Performed by: INTERNAL MEDICINE

## 2024-11-27 PROCEDURE — 2000000000 HC ICU R&B

## 2024-11-27 PROCEDURE — 84295 ASSAY OF SERUM SODIUM: CPT

## 2024-11-27 PROCEDURE — 82330 ASSAY OF CALCIUM: CPT

## 2024-11-27 PROCEDURE — 85610 PROTHROMBIN TIME: CPT

## 2024-11-27 PROCEDURE — 36600 WITHDRAWAL OF ARTERIAL BLOOD: CPT

## 2024-11-27 PROCEDURE — 94660 CPAP INITIATION&MGMT: CPT

## 2024-11-27 PROCEDURE — 85027 COMPLETE CBC AUTOMATED: CPT

## 2024-11-27 PROCEDURE — 5A09457 ASSISTANCE WITH RESPIRATORY VENTILATION, 24-96 CONSECUTIVE HOURS, CONTINUOUS POSITIVE AIRWAY PRESSURE: ICD-10-PCS | Performed by: INTERNAL MEDICINE

## 2024-11-27 PROCEDURE — 51798 US URINE CAPACITY MEASURE: CPT

## 2024-11-27 PROCEDURE — 96368 THER/DIAG CONCURRENT INF: CPT

## 2024-11-27 PROCEDURE — 2500000003 HC RX 250 WO HCPCS: Performed by: INTERNAL MEDICINE

## 2024-11-27 PROCEDURE — 80162 ASSAY OF DIGOXIN TOTAL: CPT

## 2024-11-27 PROCEDURE — 82962 GLUCOSE BLOOD TEST: CPT

## 2024-11-27 PROCEDURE — 6370000000 HC RX 637 (ALT 250 FOR IP): Performed by: NURSE PRACTITIONER

## 2024-11-27 PROCEDURE — 85520 HEPARIN ASSAY: CPT

## 2024-11-27 PROCEDURE — 82803 BLOOD GASES ANY COMBINATION: CPT

## 2024-11-27 PROCEDURE — 36415 COLL VENOUS BLD VENIPUNCTURE: CPT

## 2024-11-27 PROCEDURE — 99223 1ST HOSP IP/OBS HIGH 75: CPT | Performed by: NURSE PRACTITIONER

## 2024-11-27 PROCEDURE — 96365 THER/PROPH/DIAG IV INF INIT: CPT

## 2024-11-27 PROCEDURE — 84145 PROCALCITONIN (PCT): CPT

## 2024-11-27 PROCEDURE — 0202U NFCT DS 22 TRGT SARS-COV-2: CPT

## 2024-11-27 PROCEDURE — 6360000002 HC RX W HCPCS: Performed by: STUDENT IN AN ORGANIZED HEALTH CARE EDUCATION/TRAINING PROGRAM

## 2024-11-27 PROCEDURE — 2580000003 HC RX 258: Performed by: NURSE PRACTITIONER

## 2024-11-27 PROCEDURE — 6360000002 HC RX W HCPCS: Performed by: NURSE PRACTITIONER

## 2024-11-27 PROCEDURE — 2700000000 HC OXYGEN THERAPY PER DAY

## 2024-11-27 PROCEDURE — 6360000002 HC RX W HCPCS: Performed by: INTERNAL MEDICINE

## 2024-11-27 PROCEDURE — 2580000003 HC RX 258: Performed by: STUDENT IN AN ORGANIZED HEALTH CARE EDUCATION/TRAINING PROGRAM

## 2024-11-27 PROCEDURE — 87040 BLOOD CULTURE FOR BACTERIA: CPT

## 2024-11-27 PROCEDURE — 85730 THROMBOPLASTIN TIME PARTIAL: CPT

## 2024-11-27 PROCEDURE — 2580000003 HC RX 258: Performed by: INTERNAL MEDICINE

## 2024-11-27 PROCEDURE — 82947 ASSAY GLUCOSE BLOOD QUANT: CPT

## 2024-11-27 PROCEDURE — P9045 ALBUMIN (HUMAN), 5%, 250 ML: HCPCS | Performed by: INTERNAL MEDICINE

## 2024-11-27 RX ORDER — HEPARIN SODIUM 10000 [USP'U]/100ML
5-30 INJECTION, SOLUTION INTRAVENOUS CONTINUOUS
Status: DISCONTINUED | OUTPATIENT
Start: 2024-11-27 | End: 2024-11-27

## 2024-11-27 RX ORDER — HEPARIN SODIUM 1000 [USP'U]/ML
80 INJECTION, SOLUTION INTRAVENOUS; SUBCUTANEOUS PRN
Status: DISCONTINUED | OUTPATIENT
Start: 2024-11-27 | End: 2024-11-27

## 2024-11-27 RX ORDER — INSULIN LISPRO 100 [IU]/ML
0-8 INJECTION, SOLUTION INTRAVENOUS; SUBCUTANEOUS EVERY 6 HOURS
Status: DISCONTINUED | OUTPATIENT
Start: 2024-11-27 | End: 2024-11-29

## 2024-11-27 RX ORDER — POLYETHYLENE GLYCOL 3350 17 G/17G
17 POWDER, FOR SOLUTION ORAL DAILY PRN
Status: DISCONTINUED | OUTPATIENT
Start: 2024-11-27 | End: 2024-11-29

## 2024-11-27 RX ORDER — ENOXAPARIN SODIUM 100 MG/ML
40 INJECTION SUBCUTANEOUS DAILY
Status: DISCONTINUED | OUTPATIENT
Start: 2024-11-27 | End: 2024-11-27

## 2024-11-27 RX ORDER — SODIUM CHLORIDE 9 MG/ML
INJECTION, SOLUTION INTRAVENOUS PRN
Status: DISCONTINUED | OUTPATIENT
Start: 2024-11-27 | End: 2024-11-29

## 2024-11-27 RX ORDER — GLUCAGON 1 MG/ML
1 KIT INJECTION PRN
Status: DISCONTINUED | OUTPATIENT
Start: 2024-11-27 | End: 2024-11-29

## 2024-11-27 RX ORDER — DIGOXIN 125 MCG
0.12 TABLET ORAL DAILY
Status: DISCONTINUED | OUTPATIENT
Start: 2024-11-27 | End: 2024-11-29 | Stop reason: HOSPADM

## 2024-11-27 RX ORDER — IPRATROPIUM BROMIDE AND ALBUTEROL SULFATE 2.5; .5 MG/3ML; MG/3ML
1 SOLUTION RESPIRATORY (INHALATION)
Status: DISCONTINUED | OUTPATIENT
Start: 2024-11-28 | End: 2024-11-29 | Stop reason: HOSPADM

## 2024-11-27 RX ORDER — ONDANSETRON 4 MG/1
4 TABLET, ORALLY DISINTEGRATING ORAL EVERY 8 HOURS PRN
Status: DISCONTINUED | OUTPATIENT
Start: 2024-11-27 | End: 2024-11-29

## 2024-11-27 RX ORDER — SODIUM CHLORIDE 0.9 % (FLUSH) 0.9 %
5-40 SYRINGE (ML) INJECTION EVERY 12 HOURS SCHEDULED
Status: DISCONTINUED | OUTPATIENT
Start: 2024-11-27 | End: 2024-11-29 | Stop reason: HOSPADM

## 2024-11-27 RX ORDER — ACETAMINOPHEN 650 MG/1
650 SUPPOSITORY RECTAL EVERY 6 HOURS PRN
Status: DISCONTINUED | OUTPATIENT
Start: 2024-11-27 | End: 2024-11-29

## 2024-11-27 RX ORDER — POTASSIUM CHLORIDE 7.45 MG/ML
10 INJECTION INTRAVENOUS PRN
Status: DISCONTINUED | OUTPATIENT
Start: 2024-11-27 | End: 2024-11-29

## 2024-11-27 RX ORDER — SODIUM CHLORIDE 0.9 % (FLUSH) 0.9 %
5-40 SYRINGE (ML) INJECTION PRN
Status: DISCONTINUED | OUTPATIENT
Start: 2024-11-27 | End: 2024-11-29

## 2024-11-27 RX ORDER — ALBUMIN HUMAN 50 G/1000ML
12.5 SOLUTION INTRAVENOUS EVERY 6 HOURS
Status: COMPLETED | OUTPATIENT
Start: 2024-11-27 | End: 2024-11-28

## 2024-11-27 RX ORDER — DEXTROSE MONOHYDRATE 100 MG/ML
INJECTION, SOLUTION INTRAVENOUS CONTINUOUS PRN
Status: DISCONTINUED | OUTPATIENT
Start: 2024-11-27 | End: 2024-11-29

## 2024-11-27 RX ORDER — ACETAMINOPHEN 325 MG/1
650 TABLET ORAL EVERY 6 HOURS PRN
Status: DISCONTINUED | OUTPATIENT
Start: 2024-11-27 | End: 2024-11-29

## 2024-11-27 RX ORDER — HEPARIN SODIUM 1000 [USP'U]/ML
40 INJECTION, SOLUTION INTRAVENOUS; SUBCUTANEOUS PRN
Status: DISCONTINUED | OUTPATIENT
Start: 2024-11-27 | End: 2024-11-27

## 2024-11-27 RX ORDER — INSULIN LISPRO 100 [IU]/ML
0-4 INJECTION, SOLUTION INTRAVENOUS; SUBCUTANEOUS EVERY 6 HOURS
Status: DISCONTINUED | OUTPATIENT
Start: 2024-11-27 | End: 2024-11-27

## 2024-11-27 RX ORDER — HEPARIN SODIUM 1000 [USP'U]/ML
80 INJECTION, SOLUTION INTRAVENOUS; SUBCUTANEOUS ONCE
Status: COMPLETED | OUTPATIENT
Start: 2024-11-27 | End: 2024-11-27

## 2024-11-27 RX ORDER — INSULIN LISPRO 100 [IU]/ML
0-8 INJECTION, SOLUTION INTRAVENOUS; SUBCUTANEOUS
Status: DISCONTINUED | OUTPATIENT
Start: 2024-11-27 | End: 2024-11-27

## 2024-11-27 RX ORDER — ENOXAPARIN SODIUM 100 MG/ML
1 INJECTION SUBCUTANEOUS 2 TIMES DAILY
Status: DISCONTINUED | OUTPATIENT
Start: 2024-11-27 | End: 2024-11-28

## 2024-11-27 RX ORDER — IPRATROPIUM BROMIDE AND ALBUTEROL SULFATE 2.5; .5 MG/3ML; MG/3ML
1 SOLUTION RESPIRATORY (INHALATION)
Status: DISCONTINUED | OUTPATIENT
Start: 2024-11-27 | End: 2024-11-27

## 2024-11-27 RX ORDER — VANCOMYCIN 1.5 G/300ML
1500 INJECTION, SOLUTION INTRAVENOUS
Status: COMPLETED | OUTPATIENT
Start: 2024-11-27 | End: 2024-11-27

## 2024-11-27 RX ORDER — POTASSIUM CHLORIDE 29.8 MG/ML
20 INJECTION INTRAVENOUS PRN
Status: DISCONTINUED | OUTPATIENT
Start: 2024-11-27 | End: 2024-11-29

## 2024-11-27 RX ORDER — MAGNESIUM SULFATE IN WATER 40 MG/ML
2000 INJECTION, SOLUTION INTRAVENOUS PRN
Status: DISCONTINUED | OUTPATIENT
Start: 2024-11-27 | End: 2024-11-29

## 2024-11-27 RX ORDER — ONDANSETRON 2 MG/ML
4 INJECTION INTRAMUSCULAR; INTRAVENOUS EVERY 6 HOURS PRN
Status: DISCONTINUED | OUTPATIENT
Start: 2024-11-27 | End: 2024-11-29

## 2024-11-27 RX ADMIN — INSULIN HUMAN 5 UNITS: 100 INJECTION, SUSPENSION SUBCUTANEOUS at 20:51

## 2024-11-27 RX ADMIN — IPRATROPIUM BROMIDE AND ALBUTEROL SULFATE 1 DOSE: 2.5; .5 SOLUTION RESPIRATORY (INHALATION) at 07:36

## 2024-11-27 RX ADMIN — ALBUMIN (HUMAN) 12.5 G: 12.5 INJECTION, SOLUTION INTRAVENOUS at 10:23

## 2024-11-27 RX ADMIN — HEPARIN SODIUM AND DEXTROSE 18 UNITS/KG/HR: 10000; 5 INJECTION INTRAVENOUS at 01:41

## 2024-11-27 RX ADMIN — SODIUM CHLORIDE, PRESERVATIVE FREE 10 ML: 5 INJECTION INTRAVENOUS at 08:59

## 2024-11-27 RX ADMIN — ENOXAPARIN SODIUM 60 MG: 100 INJECTION SUBCUTANEOUS at 05:33

## 2024-11-27 RX ADMIN — INSULIN LISPRO 6 UNITS: 100 INJECTION, SOLUTION INTRAVENOUS; SUBCUTANEOUS at 04:11

## 2024-11-27 RX ADMIN — ALBUMIN (HUMAN) 12.5 G: 12.5 INJECTION, SOLUTION INTRAVENOUS at 20:53

## 2024-11-27 RX ADMIN — ALBUMIN (HUMAN) 12.5 G: 12.5 INJECTION, SOLUTION INTRAVENOUS at 16:09

## 2024-11-27 RX ADMIN — DIGOXIN 0.12 MG: 125 TABLET ORAL at 16:20

## 2024-11-27 RX ADMIN — IPRATROPIUM BROMIDE AND ALBUTEROL SULFATE 1 DOSE: 2.5; .5 SOLUTION RESPIRATORY (INHALATION) at 11:43

## 2024-11-27 RX ADMIN — CEFEPIME 2000 MG: 2 INJECTION, POWDER, FOR SOLUTION INTRAVENOUS at 05:40

## 2024-11-27 RX ADMIN — DOXYCYCLINE 100 MG: 100 INJECTION, POWDER, LYOPHILIZED, FOR SOLUTION INTRAVENOUS at 10:51

## 2024-11-27 RX ADMIN — DOXYCYCLINE 100 MG: 100 INJECTION, POWDER, LYOPHILIZED, FOR SOLUTION INTRAVENOUS at 20:56

## 2024-11-27 RX ADMIN — IPRATROPIUM BROMIDE AND ALBUTEROL SULFATE 1 DOSE: 2.5; .5 SOLUTION RESPIRATORY (INHALATION) at 23:25

## 2024-11-27 RX ADMIN — SODIUM CHLORIDE, PRESERVATIVE FREE 10 ML: 5 INJECTION INTRAVENOUS at 20:51

## 2024-11-27 RX ADMIN — HEPARIN SODIUM 4500 UNITS: 1000 INJECTION INTRAVENOUS; SUBCUTANEOUS at 01:39

## 2024-11-27 RX ADMIN — ENOXAPARIN SODIUM 60 MG: 100 INJECTION SUBCUTANEOUS at 21:17

## 2024-11-27 RX ADMIN — VANCOMYCIN 1500 MG: 1.5 INJECTION, SOLUTION INTRAVENOUS at 01:36

## 2024-11-27 RX ADMIN — INSULIN HUMAN 5 UNITS: 100 INJECTION, SUSPENSION SUBCUTANEOUS at 10:24

## 2024-11-27 RX ADMIN — PIPERACILLIN AND TAZOBACTAM 4500 MG: 4; .5 INJECTION, POWDER, FOR SOLUTION INTRAVENOUS at 01:37

## 2024-11-27 NOTE — ED PROVIDER NOTES
28.9 26.0 - 34.0 PG    MCHC 31.1 30.0 - 36.5 g/dL    RDW 13.3 11.5 - 14.5 %    Platelets 394 150 - 400 K/uL    Nucleated RBCs 0.0 0  WBC    nRBC 0.00 0.00 - 0.01 K/uL    Neutrophils % 82 (H) 32 - 75 %    Lymphocytes % 10 (L) 12 - 49 %    Monocytes % 6 5 - 13 %    Eosinophils % 0 0 - 7 %    Basophils % 0 0 - 1 %    Immature Granulocytes % 1 (H) 0.0 - 0.5 %    Neutrophils Absolute 11.5 (H) 1.8 - 8.0 K/UL    Lymphocytes Absolute 1.4 0.8 - 3.5 K/UL    Monocytes Absolute 0.8 0.0 - 1.0 K/UL    Eosinophils Absolute 0.0 0.0 - 0.4 K/UL    Basophils Absolute 0.1 0.0 - 0.1 K/UL    Immature Granulocytes Absolute 0.2 (H) 0.00 - 0.04 K/UL    Differential Type AUTOMATED     Comprehensive Metabolic Panel    Collection Time: 11/26/24 10:27 PM   Result Value Ref Range    Sodium 131 (L) 136 - 145 mmol/L    Potassium 5.1 3.5 - 5.1 mmol/L    Chloride 95 (L) 97 - 108 mmol/L    CO2 32 21 - 32 mmol/L    Anion Gap 4 2 - 12 mmol/L    Glucose 274 (H) 65 - 100 mg/dL    BUN 11 6 - 20 MG/DL    Creatinine 0.51 (L) 0.55 - 1.02 MG/DL    BUN/Creatinine Ratio 22 (H) 12 - 20      Est, Glom Filt Rate 90 >60 ml/min/1.73m2    Calcium 8.7 8.5 - 10.1 MG/DL    Total Bilirubin 0.5 0.2 - 1.0 MG/DL    ALT 10 (L) 12 - 78 U/L    AST 20 15 - 37 U/L    Alk Phosphatase 99 45 - 117 U/L    Total Protein 7.2 6.4 - 8.2 g/dL    Albumin 2.4 (L) 3.5 - 5.0 g/dL    Globulin 4.8 (H) 2.0 - 4.0 g/dL    Albumin/Globulin Ratio 0.5 (L) 1.1 - 2.2     COVID-19 & Influenza Combo    Collection Time: 11/26/24 10:27 PM    Specimen: Nasopharyngeal   Result Value Ref Range    Source Nasopharyngeal      SARS-CoV-2, PCR Not detected NOTD      Rapid Influenza A By PCR Not detected NOTD      Rapid Influenza B By PCR Not detected NOTD     Troponin    Collection Time: 11/26/24 10:27 PM   Result Value Ref Range    Troponin, High Sensitivity 14 0 - 51 ng/L   Brain Natriuretic Peptide    Collection Time: 11/26/24 10:27 PM   Result Value Ref Range    NT Pro-BNP 1,401 (H) <450 PG/ML   EKG 12    digoxin 250 MCG tablet  Commonly known as: LANOXIN     glipiZIDE 5 MG tablet  Commonly known as: GLUCOTROL     ipratropium 0.5 mg-albuterol 2.5 mg 0.5-2.5 (3) MG/3ML Soln nebulizer solution  Commonly known as: DUONEB  Inhale 3 mLs into the lungs every 4 hours as needed for Shortness of Breath     metFORMIN 500 MG tablet  Commonly known as: GLUCOPHAGE                DISCONTINUED MEDICATIONS:  Current Discharge Medication List          I am the Primary Clinician of Record.   Reena Morales DO (electronically signed)      (Please note that parts of this dictation were completed with voice recognition software. Quite often unanticipated grammatical, syntax, homophones, and other interpretive errors are inadvertently transcribed by the computer software. Please disregards these errors. Please excuse any errors that have escaped final proofreading.)          Reena Morales,   11/30/24 3365

## 2024-11-27 NOTE — PROGRESS NOTES
Pt arrived to CCU room 2503 at 0315  Drowsy, arouses to pain and opens eyes, withdraws extremities to pain, moaning/groaning not verbalizing responses to questions.    On bipap 30% fio2.  at bedside.

## 2024-11-27 NOTE — H&P
[facility]    ICU TEAM H & P Note    Name: Kamilla Lantigua   : 1935   MRN: [id number]   Date: 2024        Subjective:     Reason for ICU Admission: This is an 89 y/o F with a PMHx of  Alejandra, MDT2, Pulmonary diease on 2-3 liters NC at baseline and chronic pain who presented to the ER by her family's concern for AMS and respirary distress. She has been feeling ill x 1 to 1.5 weeks with a productive cough and associated fever and yesterday  whent to an urgent care where she was given IV antibiotics for concerns of pneumonia and sent home. Once home she continues to decompensate becoming more altered and she presented to [facility] ER. Upon workup her ABG was notable for a pH 7.27, pCO2 90, pO2 97 and she was placed on BIPAP. Ct Chest was done with note of small burden partially occlusive thrombus in the LLL lingular segmental artery and multifocal airspace infiltrates consistent with multifocal pneumonia and heparin infusion was started. ICU was consulted for admit.  On my exam patient is unarousable on BIPAP. ABG was repeated with pCO2 83.8 on repeat but mentation worse per report. Long discussion had with patient's spouse and son Jason who after weighing risks and benefits would like to proceed with intubation as suggested. At conclusion of code status discussion, patient seen becoming more arousable with spontaneous movements, coughing and moaning. ABG repeated in ICU with pCO2 77 and decision made to defer intubation for now. Family aware at bedside.      Past Medical History:      has a past medical history of Arrhythmia, Bronchitis, Chronic pain, Diabetes (HCC), and History of blood transfusion.    Past Surgical History:      has a past surgical history that includes Tonsillectomy (); Hysterectomy (); Dilation and curettage of uterus ('S); Urological Surgery (); and hip surgery (Right, 2023).    Home Medications:     Prior to Admission medications    Medication Sig  1mg/kg    INFECTIOUS DISEASE:   1) Leukocytosis  -Trend CBC, fevers  -Monitor lactate as the surrogate for tissue perfusion.   -Follow cultures.  -Check MRSA    ANTIBIOTICS TO DATE:  11/27 Zosyn x 1, vanco x 1  11/2 Cefepime    ICU DAILY CHECKLIST     Code Status:FULL  DVT Prophylaxis:Lovenox  T/L/D: See EPIC  SUP: NA  Diet: NPO  Activity Level:Bedrest  OT/PT appropriate:Y  ABCDEF Bundle/Checklist Completed:Yes  Disposition: ICU  Patient/Family Updated: Y, spouse and son Usman             CRITICAL CARE DOCUMENTATION  I had a face to face encounter with the patient, reviewed and interpreted patient data including clinical events, labs, images, vital signs, I/O's, and examined patient.  I have discussed the case and the plan and management of the patient's care with the consulting services, the bedside nurses and the respiratory therapist.      NOTE OF PERSONAL INVOLVEMENT IN CARE   This patient has a high probability of imminent, clinically significant deterioration, which requires the highest level of preparedness to intervene urgently. I participated in the decision-making and personally managed or directed the management of the following life and organ supporting interventions that required my frequent assessment to treat or prevent imminent deterioration.    I personally spent 90 minutes of critical care time.  This is time spent at this critically ill patient's bedside actively involved in patient care as well as the coordination of care.  This does not include any procedural time which has been billed separately.    ERI Bird - CNP   Critical Care Medicine  [facility]

## 2024-11-27 NOTE — PROGRESS NOTES
Attended Interdisciplinary rounds in CCU; patient care was discussed.    ADAMA Causey, BCC, Staff [facility] Paging Service  [phone])

## 2024-11-27 NOTE — PLAN OF CARE
Problem: Respiratory - Adult  Goal: Achieves optimal ventilation and oxygenation  Outcome: Not Progressing     Problem: Respiratory - Adult  Goal: Achieves optimal ventilation and oxygenation  Outcome: Not Progressing     Problem: Respiratory - Adult  Goal: Achieves optimal ventilation and oxygenation  Outcome: Not Progressing

## 2024-11-27 NOTE — PROGRESS NOTES
Bedside shift change report given to Garima RN (oncoming nurse) by Philippe RN (offgoing nurse). Report included the following information Nurse Handoff Report, Adult Overview, Intake/Output, MAR, and Recent Results.     0800 Shift assessment complete, see flowsheets     1200 Reassessment complete, see flowsheets for changes     1553 Per MD order, Bipap removed by RT and patient placed on 3L NC (Bipap can remain off only while patient is awake)    1600 Reassessment complete, see flowsheets for changes     1630 Patient bladder scanned d/t 200ml UO noted today, 183ml noted via bladder scan    Bedside shift change report given to Demetrius RAMOS (oncoming nurse) by Garima RN (offgoing nurse). Report included the following information Nurse Handoff Report, Adult Overview, Intake/Output, MAR, and Recent Results.

## 2024-11-27 NOTE — INTERDISCIPLINARY ROUNDS
Critical care interdisciplinary rounds today.  Following members present: Case Management, , Nursing, Nutrition, Pharmacy, and Physician.  listening to rounds. Remains unresponsive.  Family invited to participate. Plan of care discussed.  See clinical pathway for plan of care and interventions and desired outcomes.

## 2024-11-27 NOTE — CONSULTS
Palliative Medicine  Patient Name: Kamilla Lantigua  YOB: 1935  MRN: [id number]  Age: 89 y.o.  Gender: female    Date of Initial Consult: 11/27/2024  Date of Service: 11/27/2024  Time: 2:08 PM  Provider: ERI Holliday NP  Hospital Day: 2  Admit Date: 11/26/2024  Referring Provider: Camilla Ku MD    Reasons for Consultation:  Goals of Care    HISTORY OF PRESENT ILLNESS (HPI):   Kamilla Lantigua is a 89 y.o. female with a past medical history of pAfib, MDT2, some undefined Pulmonary diease on 2-3 liters NC at baseline (was followed by pulm in the past but her doc passed away and she never established care with a new one) and chronic pain , who was admitted on 11/26/2024 from home with a diagnosis of acute hypercapnic and hypoxic respiratory failure with metabolic encephalopathy.     She was put on Bipap overnight, and intubation was considered due to her being unresponsive, but she is slightly arousable now, not opening her eyes but will nod in response to occasional questions.     PALLIATIVE DIAGNOSES:    Acute hypoxic respiratory failure  DNR conversation  Goals of care  Frailty  Somnolence    ASSESSMENT AND PLAN:   After speaking with the ICU attending, I met with Mr Lantigua.  I offered to have his son on the phone, but he said he was at work and would discuss with him later.   We reviewed their conversation with Dr Rodrigues earlier today- Mr Lantigua is feeling a lot more at ease with the decisions he has made.  He shared with me that he is not interested in Intubation.  He asked me if I thought that she could recover back to \"something that resembles quality of life\"- I shared with him that I don't know, but the more days she remains in the ICU, especially minimally responsive and on bipap, the less likely this will happen.   He is agreeable to DNR as well, sharing with me that he only wants to do interventions that will \"work\" to make her better and back to her baseline.   I  strongly suggested that if she remains in this state tomorrow, we sit down and re-group- as it seems that his ultimate goal is quality of life, not quantity. He was not able to define what that looks like today, but I suspect with some time to process, he will better be able to discuss this tomorrow.   I will have my colleague check in tomorrow with the team to determine if we will see her to further these conversations, or if the attending has already.   Please call with any palliative questions or concerns.  Palliative Care Team is available via perfect serve or via phone.    Referrals to:   [] Outpatient Palliative Care  [] Home Based Palliative Care  [] Home Based Primary Care  [] Hospice       ADVANCE CARE PLANNING:   [] [facility] Interdisciplinary Team has updated the ACP Navigator with Health Care Decision Maker and Patient Capacity      Primary Decision Maker: Barry Lantigua - Spouse - [phone]  Confirm Advance Directive: None    Current Code Status: DNR     Goals of Care: Goals of Care and Interventions  Patient/Health Care Proxy Stated Goals: Recovery from acute illness  Medical Interventions: Limited additional interventions       Please refer to Palliative Medicine ACP notes for further details.    PALLIATIVE ASSESSMENT:      Palliative Performance Scale (PPS):  PPS: 20    ECOG:        Modified ESAS:  Modified-[place] Symptom Assessment Scale (ESAS)  Tiredness Score: Worst possible tiredness  Depression Score: Not depressed  Pain Score: No pain  Anxiety Score: Not anxious  Nausea Score: Not nauseated  Appetite Score: Worst possible appetite  Dyspnea Score: 7    Clinical Pain Assessment (nonverbal scale for severity on nonverbal patients):   Clinical Pain Assessment  Severity: 0       NVPS:       RDOS:         Vital Signs: Blood pressure (!) 114/46, pulse 70, temperature 98.6 °F (37 °C), temperature source Axillary, resp. rate 19, height 1.626 m (5' 4\"), weight 53.1 kg (117 lb 1 oz), SpO2

## 2024-11-28 LAB
ANION GAP SERPL CALC-SCNC: 1 MMOL/L (ref 2–12)
BACTERIA SPEC CULT: NORMAL
BACTERIA SPEC CULT: NORMAL
BASOPHILS # BLD: 0 K/UL (ref 0–0.1)
BASOPHILS NFR BLD: 0 % (ref 0–1)
BUN SERPL-MCNC: 9 MG/DL (ref 6–20)
BUN/CREAT SERPL: 25 (ref 12–20)
CALCIUM SERPL-MCNC: 9 MG/DL (ref 8.5–10.1)
CHLORIDE SERPL-SCNC: 101 MMOL/L (ref 97–108)
CO2 SERPL-SCNC: 36 MMOL/L (ref 21–32)
CREAT SERPL-MCNC: 0.36 MG/DL (ref 0.55–1.02)
DIFFERENTIAL METHOD BLD: ABNORMAL
EOSINOPHIL # BLD: 0.1 K/UL (ref 0–0.4)
EOSINOPHIL NFR BLD: 1 % (ref 0–7)
ERYTHROCYTE [DISTWIDTH] IN BLOOD BY AUTOMATED COUNT: 13.2 % (ref 11.5–14.5)
EST. AVERAGE GLUCOSE BLD GHB EST-MCNC: 180 MG/DL
GLUCOSE BLD STRIP.AUTO-MCNC: 206 MG/DL (ref 65–117)
GLUCOSE BLD STRIP.AUTO-MCNC: 219 MG/DL (ref 65–117)
GLUCOSE BLD STRIP.AUTO-MCNC: 262 MG/DL (ref 65–117)
GLUCOSE BLD STRIP.AUTO-MCNC: 84 MG/DL (ref 65–117)
GLUCOSE BLD STRIP.AUTO-MCNC: 87 MG/DL (ref 65–117)
GLUCOSE SERPL-MCNC: 82 MG/DL (ref 65–100)
HBA1C MFR BLD: 7.9 % (ref 4–5.6)
HCT VFR BLD AUTO: 31.1 % (ref 35–47)
HGB BLD-MCNC: 9.9 G/DL (ref 11.5–16)
IMM GRANULOCYTES # BLD AUTO: 0.1 K/UL (ref 0–0.04)
IMM GRANULOCYTES NFR BLD AUTO: 1 % (ref 0–0.5)
LYMPHOCYTES # BLD: 1.4 K/UL (ref 0.8–3.5)
LYMPHOCYTES NFR BLD: 13 % (ref 12–49)
MAGNESIUM SERPL-MCNC: 1.4 MG/DL (ref 1.6–2.4)
MCH RBC QN AUTO: 28.7 PG (ref 26–34)
MCHC RBC AUTO-ENTMCNC: 31.8 G/DL (ref 30–36.5)
MCV RBC AUTO: 90.1 FL (ref 80–99)
MONOCYTES # BLD: 0.7 K/UL (ref 0–1)
MONOCYTES NFR BLD: 6 % (ref 5–13)
NEUTS SEG # BLD: 8.1 K/UL (ref 1.8–8)
NEUTS SEG NFR BLD: 79 % (ref 32–75)
NRBC # BLD: 0 K/UL (ref 0–0.01)
NRBC BLD-RTO: 0 PER 100 WBC
PHOSPHATE SERPL-MCNC: 2.1 MG/DL (ref 2.6–4.7)
PLATELET # BLD AUTO: 393 K/UL (ref 150–400)
PMV BLD AUTO: 8.5 FL (ref 8.9–12.9)
POTASSIUM SERPL-SCNC: 3.5 MMOL/L (ref 3.5–5.1)
RBC # BLD AUTO: 3.45 M/UL (ref 3.8–5.2)
SERVICE CMNT-IMP: ABNORMAL
SERVICE CMNT-IMP: NORMAL
SODIUM SERPL-SCNC: 138 MMOL/L (ref 136–145)
WBC # BLD AUTO: 10.3 K/UL (ref 3.6–11)

## 2024-11-28 PROCEDURE — 80048 BASIC METABOLIC PNL TOTAL CA: CPT

## 2024-11-28 PROCEDURE — 6370000000 HC RX 637 (ALT 250 FOR IP): Performed by: INTERNAL MEDICINE

## 2024-11-28 PROCEDURE — 87899 AGENT NOS ASSAY W/OPTIC: CPT

## 2024-11-28 PROCEDURE — 2700000000 HC OXYGEN THERAPY PER DAY

## 2024-11-28 PROCEDURE — 83036 HEMOGLOBIN GLYCOSYLATED A1C: CPT

## 2024-11-28 PROCEDURE — 85025 COMPLETE CBC W/AUTO DIFF WBC: CPT

## 2024-11-28 PROCEDURE — 94660 CPAP INITIATION&MGMT: CPT

## 2024-11-28 PROCEDURE — P9045 ALBUMIN (HUMAN), 5%, 250 ML: HCPCS | Performed by: INTERNAL MEDICINE

## 2024-11-28 PROCEDURE — 2580000003 HC RX 258: Performed by: INTERNAL MEDICINE

## 2024-11-28 PROCEDURE — 94640 AIRWAY INHALATION TREATMENT: CPT

## 2024-11-28 PROCEDURE — 2500000003 HC RX 250 WO HCPCS: Performed by: INTERNAL MEDICINE

## 2024-11-28 PROCEDURE — 6360000002 HC RX W HCPCS: Performed by: INTERNAL MEDICINE

## 2024-11-28 PROCEDURE — 2580000003 HC RX 258: Performed by: NURSE PRACTITIONER

## 2024-11-28 PROCEDURE — 87449 NOS EACH ORGANISM AG IA: CPT

## 2024-11-28 PROCEDURE — 99232 SBSQ HOSP IP/OBS MODERATE 35: CPT | Performed by: INTERNAL MEDICINE

## 2024-11-28 PROCEDURE — 84100 ASSAY OF PHOSPHORUS: CPT

## 2024-11-28 PROCEDURE — 2060000000 HC ICU INTERMEDIATE R&B

## 2024-11-28 PROCEDURE — 83735 ASSAY OF MAGNESIUM: CPT

## 2024-11-28 PROCEDURE — 82962 GLUCOSE BLOOD TEST: CPT

## 2024-11-28 PROCEDURE — 36415 COLL VENOUS BLD VENIPUNCTURE: CPT

## 2024-11-28 RX ORDER — MAGNESIUM SULFATE 1 G/100ML
1000 INJECTION INTRAVENOUS ONCE
Status: COMPLETED | OUTPATIENT
Start: 2024-11-28 | End: 2024-11-28

## 2024-11-28 RX ADMIN — INSULIN LISPRO 4 UNITS: 100 INJECTION, SOLUTION INTRAVENOUS; SUBCUTANEOUS at 18:21

## 2024-11-28 RX ADMIN — APIXABAN 10 MG: 5 TABLET, FILM COATED ORAL at 21:13

## 2024-11-28 RX ADMIN — DOXYCYCLINE 100 MG: 100 INJECTION, POWDER, LYOPHILIZED, FOR SOLUTION INTRAVENOUS at 21:18

## 2024-11-28 RX ADMIN — APIXABAN 10 MG: 5 TABLET, FILM COATED ORAL at 09:02

## 2024-11-28 RX ADMIN — WATER 1000 MG: 1 INJECTION INTRAMUSCULAR; INTRAVENOUS; SUBCUTANEOUS at 08:45

## 2024-11-28 RX ADMIN — SODIUM CHLORIDE, PRESERVATIVE FREE 10 ML: 5 INJECTION INTRAVENOUS at 21:13

## 2024-11-28 RX ADMIN — MAGNESIUM SULFATE HEPTAHYDRATE 1000 MG: 1 INJECTION, SOLUTION INTRAVENOUS at 08:49

## 2024-11-28 RX ADMIN — INSULIN HUMAN 5 UNITS: 100 INJECTION, SUSPENSION SUBCUTANEOUS at 21:12

## 2024-11-28 RX ADMIN — IPRATROPIUM BROMIDE AND ALBUTEROL SULFATE 1 DOSE: 2.5; .5 SOLUTION RESPIRATORY (INHALATION) at 23:13

## 2024-11-28 RX ADMIN — IPRATROPIUM BROMIDE AND ALBUTEROL SULFATE 1 DOSE: 2.5; .5 SOLUTION RESPIRATORY (INHALATION) at 07:22

## 2024-11-28 RX ADMIN — DIGOXIN 0.12 MG: 125 TABLET ORAL at 08:44

## 2024-11-28 RX ADMIN — SODIUM CHLORIDE, PRESERVATIVE FREE 10 ML: 5 INJECTION INTRAVENOUS at 08:45

## 2024-11-28 RX ADMIN — ALBUMIN (HUMAN) 12.5 G: 12.5 INJECTION, SOLUTION INTRAVENOUS at 03:23

## 2024-11-28 RX ADMIN — POTASSIUM PHOSPHATE, MONOBASIC POTASSIUM PHOSPHATE, DIBASIC 30 MMOL: 224; 236 INJECTION, SOLUTION, CONCENTRATE INTRAVENOUS at 08:59

## 2024-11-28 RX ADMIN — IPRATROPIUM BROMIDE AND ALBUTEROL SULFATE 1 DOSE: 2.5; .5 SOLUTION RESPIRATORY (INHALATION) at 16:03

## 2024-11-28 RX ADMIN — DOXYCYCLINE 100 MG: 100 INJECTION, POWDER, LYOPHILIZED, FOR SOLUTION INTRAVENOUS at 11:28

## 2024-11-28 RX ADMIN — INSULIN LISPRO 2 UNITS: 100 INJECTION, SOLUTION INTRAVENOUS; SUBCUTANEOUS at 21:12

## 2024-11-28 ASSESSMENT — ENCOUNTER SYMPTOMS
EYES NEGATIVE: 1
VOMITING: 0
ABDOMINAL PAIN: 0
NAUSEA: 0
SORE THROAT: 0
SHORTNESS OF BREATH: 1
CHEST TIGHTNESS: 0
TROUBLE SWALLOWING: 0
WHEEZING: 0

## 2024-11-28 NOTE — PROGRESS NOTES
Hospitalist Progress Note    NAME:   Kamilla Lantigua   : 1935   MRN: [id number]     Date/Time: 2024 11:15 AM  Patient PCP: No primary care provider on file.    Estimated discharge date:48 hrs   Barriers:  clinical improvement   Chief Complaint   Patient presents with    Shortness of Breath      Hospital Course:  This is an 89 y/o F with a PMHx of  pAfib, MDT2, Pulmonary diease on 2-3 liters NC at baseline and chronic pain who presented to the ER by her family's concern for AMS and respirary distress. She has been feeling ill x 1 to 1.5 weeks with a productive cough and associated fever and yesterday  whent to an urgent care where she was given IV antibiotics for concerns of pneumonia and sent home. Once home she continues to decompensate becoming more altered and she presented to [facility] ER. Upon workup her ABG was notable for a pH 7.27, pCO2 90, pO2 97 and she was placed on BIPAP. Ct Chest was done with note of small burden partially occlusive thrombus in the LLL lingular segmental artery and multifocal airspace infiltrates consistent with multifocal pneumonia and heparin infusion was started. ICU was consulted for admit.  On my exam patient is unarousable on BIPAP. ABG was repeated with pCO2 83.8 on repeat but mentation worse per report. Long discussion had with patient's spouse and son Jason who after weighing risks and benefits would like to proceed with intubation as suggested. At conclusion of code status discussion, patient seen becoming more arousable with spontaneous movements, coughing and moaning. ABG repeated in ICU with pCO2 77 and decision made to defer intubation for now. Family aware at bedside.     ICU transfer to Hospitalist service     Assessment / Plan:  Metabolic encephalopathy this suspect secondary to hypercarbia  -Head CT negative for acute findings  -Minimize narcotics and sedatives  -PT OT    Acute on chronic hypercarbic respiratory failure with hypoxia  Concern  planning options with Case Management.  [x] Discussed management of the case with:  Palliative   [] Telemetry personally reviewed and interpreted as documented above    [x] Imaging personally reviewed and interpreted, includes:  CXR  , CTA chest , CT head   [] Data Review (any 3)  [x] All available Consultant notes from yesterday/today were reviewed  [x] All current labs were reviewed and interpreted for clinical significance   [x] Appropriate follow-up labs were ordered  [] Collateral history obtained from:           Code Status: DNR   DVT Prophylaxis: Eliquis      Subjective:     Chief Complaint / Reason for Physician Visit  Patient seen at bedside sleeping but easily aroused.  She states that she is feeling much better but still weak..  Discussed with RN events overnight.       Objective:     VITALS:   Last 24hrs VS reviewed since prior progress note. Most recent are:  Patient Vitals for the past 24 hrs:   BP Temp Temp src Pulse Resp SpO2 Weight   11/28/24 1000 (!) 135/56 -- -- 75 (!) 32 98 % --   11/28/24 0900 (!) 134/55 -- -- 80 (!) 33 97 % --   11/28/24 0844 -- -- -- 84 -- -- --   11/28/24 0800 136/62 98.3 °F (36.8 °C) Oral 79 22 98 % --   11/28/24 0722 -- -- -- 78 24 97 % --   11/28/24 0524 -- -- -- -- -- -- 58.2 kg (128 lb 4.9 oz)   11/28/24 0500 -- -- -- -- -- 94 % --   11/28/24 0430 -- -- -- -- -- 96 % --   11/28/24 0400 (!) 142/57 -- -- 82 28 98 % --   11/28/24 0330 -- -- -- -- -- 97 % --   11/28/24 0300 (!) 143/61 98.5 °F (36.9 °C) Oral 91 23 96 % --   11/28/24 0200 (!) 132/59 -- -- 69 20 98 % --   11/28/24 0100 127/60 -- -- 70 19 98 % --   11/28/24 0000 (!) 121/54 -- -- 65 18 99 % --   11/27/24 2325 -- -- -- 67 19 99 % --   11/27/24 2300 135/62 98.7 °F (37.1 °C) Oral 73 25 99 % --   11/27/24 2200 132/74 -- -- 81 19 99 % --   11/27/24 2100 (!) 148/63 -- -- 81 19 98 % --   11/27/24 2000 (!) 132/57 99 °F (37.2 °C) Oral 81 30 97 % --   11/27/24 1926 -- -- -- 95 24 95 % --   11/27/24 1900 119/63 -- -- 92 17

## 2024-11-28 NOTE — PROGRESS NOTES
Bedside shift change report given to Garima RN (oncoming nurse) by Demetrius RN (offgoing nurse). Report included the following information Nurse Handoff Report, Adult Overview, Intake/Output, MAR, and Recent Results.     0800 Shift assessment complete, see flowsheets     1030 Patient transported to room 2307 on the monitor and 1L NC

## 2024-11-28 NOTE — PROGRESS NOTES
I shared with him that I don't know, but the more days she remains in the ICU, especially minimally responsive and on bipap, the less likely this will happen.   He is agreeable to DNR as well, sharing with me that he only wants to do interventions that will \"work\" to make her better and back to her baseline.   I strongly suggested that if she remains in this state tomorrow, we sit down and re-group- as it seems that his ultimate goal is quality of life, not quantity. He was not able to define what that looks like today, but I suspect with some time to process, he will better be able to discuss this tomorrow.   I will have my colleague check in tomorrow with the team to determine if we will see her to further these conversations, or if the attending has already.   Please call with any palliative questions or concerns.  Palliative Care Team is available via perfect serve or via phone.    Referrals to:   [] Outpatient Palliative Care  [] Home Based Palliative Care  [] Home Based Primary Care  [] Hospice       ADVANCE CARE PLANNING:   [] [facility] Interdisciplinary Team has updated the ACP Navigator with Health Care Decision Maker and Patient Capacity      Primary Decision Maker: Barry Lantigua - Spouse - [phone]  Confirm Advance Directive: None    Current Code Status: DNR     Goals of Care: Goals of Care and Interventions  Patient/Health Care Proxy Stated Goals: Recovery from acute illness  Medical Interventions: Limited additional interventions       Please refer to Palliative Medicine ACP notes for further details.    PALLIATIVE ASSESSMENT:      Palliative Performance Scale (PPS):  PPS: 20    ECOG:        Modified ESAS:  Modified-[place] Symptom Assessment Scale (ESAS)  Tiredness Score: Worst possible tiredness  Depression Score: Not depressed  Pain Score: No pain  Anxiety Score: Not anxious  Nausea Score: Not nauseated  Appetite Score: Worst possible appetite  Dyspnea Score: 7    Clinical Pain Assessment  (nonverbal scale for severity on nonverbal patients):   Clinical Pain Assessment  Severity: 0       NVPS:       RDOS:         Vital Signs: Blood pressure (!) 135/56, pulse 75, temperature 98.3 °F (36.8 °C), temperature source Oral, resp. rate (!) 32, height 1.626 m (5' 4\"), weight 58.2 kg (128 lb 4.9 oz), SpO2 98%.    PHYSICAL ASSESSMENT:   General: [] Oriented x3  [] Well appearing  [] Intubated  [x]Ill appearing  []Other:  Mental Status: [] Normal mental status exam  [x] Drowsy  [] Confused  []Other:  Cardiovascular: [] Regular rate/rhythm  [] Arrhythmia  [] Other:  Chest: [] Effort normal  []Lungs clear  [] Respiratory distress  [x]Tachypnea  [] Other:  Abdomen: [x] Soft/non-tender  [] Normal appearance  [] Distended  [] Ascites  [] Other:  Neurological: [] Normal speech  [] Normal sensation  []Deficits present:  Extremity: [] Normal skin color/temp  [] Clubbing/cyanosis  [] No edema  [] Other:    Wt Readings from Last 15 Encounters:   11/28/24 58.2 kg (128 lb 4.9 oz)   03/25/24 60.1 kg (132 lb 7.9 oz)   01/11/24 63.5 kg (139 lb 15.9 oz)   09/25/23 63.5 kg (140 lb)   08/21/23 59 kg (130 lb)   07/13/23 61.9 kg (136 lb 7.4 oz)   01/14/22 63.5 kg (140 lb)        Current Diet: ADULT DIET; Clear Liquid       PSYCHOSOCIAL/SPIRITUAL SCREENING:   Palliative IDT has assessed this patient for cultural preferences / practices and a referral made as appropriate to needs (Cultural Services, Patient Advocacy, Ethics, etc.)    Spiritual Affiliation: Presbyterian    Any spiritual / Mormon concerns:  [] Yes /  [x] No   If \"Yes\" to discuss with pastoral care during IDT     Does caregiver feel burdened by caring for their loved one:   [] Yes /  [x] No /  [] No Caregiver Present/Available [] No Caregiver [] Pt Lives at Facility  If \"Yes\" to discuss with social work during IDT    Anticipatory grief assessment:   [x] Normal  / [] Maladaptive     If \"Maladaptive\" to discuss with social work during IDT    ESAS Anxiety: Anxiety Score:

## 2024-11-28 NOTE — PROGRESS NOTES
TRANSFER - IN REPORT:    Verbal report received from Dieudonne RN on Kamilla Lantigua  being received from CCU for routine progression of patient care      Report consisted of patient's Situation, Background, Assessment and   Recommendations(SBAR).     Information from the following report(s) Nurse Handoff Report was reviewed with the receiving nurse.    Opportunity for questions and clarification was provided.      Assessment completed upon patient's arrival to unit and care assumed.

## 2024-11-29 VITALS
HEART RATE: 80 BPM | WEIGHT: 126.54 LBS | RESPIRATION RATE: 22 BRPM | OXYGEN SATURATION: 96 % | BODY MASS INDEX: 21.6 KG/M2 | HEIGHT: 64 IN | TEMPERATURE: 98.5 F | SYSTOLIC BLOOD PRESSURE: 146 MMHG | DIASTOLIC BLOOD PRESSURE: 59 MMHG

## 2024-11-29 PROBLEM — R54 FRAILTY: Status: RESOLVED | Noted: 2024-11-27 | Resolved: 2024-11-29

## 2024-11-29 PROBLEM — Z71.89 GOALS OF CARE, COUNSELING/DISCUSSION: Status: RESOLVED | Noted: 2024-11-27 | Resolved: 2024-11-29

## 2024-11-29 PROBLEM — R40.0 SOMNOLENCE: Status: RESOLVED | Noted: 2024-11-27 | Resolved: 2024-11-29

## 2024-11-29 PROBLEM — J96.01 ACUTE HYPOXIC RESPIRATORY FAILURE: Status: RESOLVED | Noted: 2024-11-27 | Resolved: 2024-11-29

## 2024-11-29 PROBLEM — J96.92 RESPIRATORY FAILURE WITH HYPOXIA AND HYPERCAPNIA, UNSPECIFIED CHRONICITY: Status: RESOLVED | Noted: 2024-11-27 | Resolved: 2024-11-29

## 2024-11-29 PROBLEM — J96.91 RESPIRATORY FAILURE WITH HYPOXIA AND HYPERCAPNIA, UNSPECIFIED CHRONICITY: Status: RESOLVED | Noted: 2024-11-27 | Resolved: 2024-11-29

## 2024-11-29 LAB
ALBUMIN SERPL-MCNC: 2.8 G/DL (ref 3.5–5)
ALBUMIN/GLOB SERPL: 0.6 (ref 1.1–2.2)
ALP SERPL-CCNC: 83 U/L (ref 45–117)
ALT SERPL-CCNC: 9 U/L (ref 12–78)
ANION GAP SERPL CALC-SCNC: 2 MMOL/L (ref 2–12)
AST SERPL-CCNC: 13 U/L (ref 15–37)
BASOPHILS # BLD: 0.1 K/UL (ref 0–0.1)
BASOPHILS NFR BLD: 1 % (ref 0–1)
BILIRUB SERPL-MCNC: 0.6 MG/DL (ref 0.2–1)
BUN SERPL-MCNC: 5 MG/DL (ref 6–20)
BUN/CREAT SERPL: 11 (ref 12–20)
CALCIUM SERPL-MCNC: 9.2 MG/DL (ref 8.5–10.1)
CHLORIDE SERPL-SCNC: 98 MMOL/L (ref 97–108)
CO2 SERPL-SCNC: 37 MMOL/L (ref 21–32)
CREAT SERPL-MCNC: 0.46 MG/DL (ref 0.55–1.02)
DIFFERENTIAL METHOD BLD: ABNORMAL
EOSINOPHIL # BLD: 0.1 K/UL (ref 0–0.4)
EOSINOPHIL NFR BLD: 1 % (ref 0–7)
ERYTHROCYTE [DISTWIDTH] IN BLOOD BY AUTOMATED COUNT: 13.2 % (ref 11.5–14.5)
GLOBULIN SER CALC-MCNC: 4.7 G/DL (ref 2–4)
GLUCOSE BLD STRIP.AUTO-MCNC: 248 MG/DL (ref 65–117)
GLUCOSE BLD STRIP.AUTO-MCNC: 99 MG/DL (ref 65–117)
GLUCOSE SERPL-MCNC: 99 MG/DL (ref 65–100)
HCT VFR BLD AUTO: 35.8 % (ref 35–47)
HGB BLD-MCNC: 11.2 G/DL (ref 11.5–16)
IMM GRANULOCYTES # BLD AUTO: 0.1 K/UL (ref 0–0.04)
IMM GRANULOCYTES NFR BLD AUTO: 1 % (ref 0–0.5)
LYMPHOCYTES # BLD: 2 K/UL (ref 0.8–3.5)
LYMPHOCYTES NFR BLD: 22 % (ref 12–49)
MAGNESIUM SERPL-MCNC: 1.8 MG/DL (ref 1.6–2.4)
MCH RBC QN AUTO: 28.6 PG (ref 26–34)
MCHC RBC AUTO-ENTMCNC: 31.3 G/DL (ref 30–36.5)
MCV RBC AUTO: 91.6 FL (ref 80–99)
MONOCYTES # BLD: 0.8 K/UL (ref 0–1)
MONOCYTES NFR BLD: 9 % (ref 5–13)
NEUTS SEG # BLD: 6 K/UL (ref 1.8–8)
NEUTS SEG NFR BLD: 66 % (ref 32–75)
NRBC # BLD: 0 K/UL (ref 0–0.01)
NRBC BLD-RTO: 0 PER 100 WBC
PHOSPHATE SERPL-MCNC: 3.9 MG/DL (ref 2.6–4.7)
PLATELET # BLD AUTO: 429 K/UL (ref 150–400)
PMV BLD AUTO: 8.2 FL (ref 8.9–12.9)
POTASSIUM SERPL-SCNC: 4.1 MMOL/L (ref 3.5–5.1)
PROT SERPL-MCNC: 7.5 G/DL (ref 6.4–8.2)
RBC # BLD AUTO: 3.91 M/UL (ref 3.8–5.2)
SERVICE CMNT-IMP: ABNORMAL
SERVICE CMNT-IMP: NORMAL
SODIUM SERPL-SCNC: 137 MMOL/L (ref 136–145)
WBC # BLD AUTO: 9.1 K/UL (ref 3.6–11)

## 2024-11-29 PROCEDURE — 6360000002 HC RX W HCPCS: Performed by: INTERNAL MEDICINE

## 2024-11-29 PROCEDURE — 99232 SBSQ HOSP IP/OBS MODERATE 35: CPT | Performed by: INTERNAL MEDICINE

## 2024-11-29 PROCEDURE — 85025 COMPLETE CBC W/AUTO DIFF WBC: CPT

## 2024-11-29 PROCEDURE — 36415 COLL VENOUS BLD VENIPUNCTURE: CPT

## 2024-11-29 PROCEDURE — 2700000000 HC OXYGEN THERAPY PER DAY

## 2024-11-29 PROCEDURE — 92610 EVALUATE SWALLOWING FUNCTION: CPT

## 2024-11-29 PROCEDURE — 2580000003 HC RX 258: Performed by: INTERNAL MEDICINE

## 2024-11-29 PROCEDURE — 80053 COMPREHEN METABOLIC PANEL: CPT

## 2024-11-29 PROCEDURE — 94640 AIRWAY INHALATION TREATMENT: CPT

## 2024-11-29 PROCEDURE — 6370000000 HC RX 637 (ALT 250 FOR IP): Performed by: INTERNAL MEDICINE

## 2024-11-29 PROCEDURE — 84100 ASSAY OF PHOSPHORUS: CPT

## 2024-11-29 PROCEDURE — 2500000003 HC RX 250 WO HCPCS: Performed by: INTERNAL MEDICINE

## 2024-11-29 PROCEDURE — 83735 ASSAY OF MAGNESIUM: CPT

## 2024-11-29 PROCEDURE — 82962 GLUCOSE BLOOD TEST: CPT

## 2024-11-29 RX ORDER — DOXYCYCLINE HYCLATE 100 MG
100 TABLET ORAL 2 TIMES DAILY
Qty: 14 TABLET | Refills: 0 | Status: SHIPPED | OUTPATIENT
Start: 2024-11-29 | End: 2024-12-06

## 2024-11-29 RX ORDER — CEFDINIR 300 MG/1
300 CAPSULE ORAL 2 TIMES DAILY
Qty: 14 CAPSULE | Refills: 0 | Status: SHIPPED | OUTPATIENT
Start: 2024-11-29 | End: 2024-12-06

## 2024-11-29 RX ORDER — INSULIN LISPRO 100 [IU]/ML
0-8 INJECTION, SOLUTION INTRAVENOUS; SUBCUTANEOUS
Status: DISCONTINUED | OUTPATIENT
Start: 2024-11-29 | End: 2024-11-29 | Stop reason: HOSPADM

## 2024-11-29 RX ORDER — ACETAMINOPHEN 500 MG
1 TABLET ORAL 2 TIMES DAILY WITH MEALS
Qty: 30 CAPSULE | Refills: 0 | Status: SHIPPED | OUTPATIENT
Start: 2024-11-29 | End: 2024-12-14

## 2024-11-29 RX ADMIN — DIGOXIN 0.12 MG: 125 TABLET ORAL at 10:40

## 2024-11-29 RX ADMIN — IPRATROPIUM BROMIDE AND ALBUTEROL SULFATE 1 DOSE: 2.5; .5 SOLUTION RESPIRATORY (INHALATION) at 15:57

## 2024-11-29 RX ADMIN — IPRATROPIUM BROMIDE AND ALBUTEROL SULFATE 1 DOSE: 2.5; .5 SOLUTION RESPIRATORY (INHALATION) at 07:43

## 2024-11-29 RX ADMIN — DOXYCYCLINE 100 MG: 100 INJECTION, POWDER, LYOPHILIZED, FOR SOLUTION INTRAVENOUS at 10:35

## 2024-11-29 RX ADMIN — WATER 1000 MG: 1 INJECTION INTRAMUSCULAR; INTRAVENOUS; SUBCUTANEOUS at 10:35

## 2024-11-29 RX ADMIN — APIXABAN 10 MG: 5 TABLET, FILM COATED ORAL at 10:40

## 2024-11-29 NOTE — PLAN OF CARE
Problem: Chronic Conditions and Co-morbidities  Goal: Patient's chronic conditions and co-morbidity symptoms are monitored and maintained or improved  Outcome: Progressing  Flowsheets (Taken 11/28/2024 2035)  Care Plan - Patient's Chronic Conditions and Co-Morbidity Symptoms are Monitored and Maintained or Improved:   Monitor and assess patient's chronic conditions and comorbid symptoms for stability, deterioration, or improvement   Collaborate with multidisciplinary team to address chronic and comorbid conditions and prevent exacerbation or deterioration   Update acute care plan with appropriate goals if chronic or comorbid symptoms are exacerbated and prevent overall improvement and discharge     Problem: Discharge Planning  Goal: Discharge to home or other facility with appropriate resources  Outcome: Progressing     Problem: Respiratory - Adult  Goal: Achieves optimal ventilation and oxygenation  Outcome: Progressing     Problem: Pain  Goal: Verbalizes/displays adequate comfort level or baseline comfort level  Outcome: Progressing     Problem: Skin/Tissue Integrity  Goal: Absence of new skin breakdown  Description: 1.  Monitor for areas of redness and/or skin breakdown  2.  Assess vascular access sites hourly  3.  Every 4-6 hours minimum:  Change oxygen saturation probe site  4.  Every 4-6 hours:  If on nasal continuous positive airway pressure, respiratory therapy assess nares and determine need for appliance change or resting period.  Outcome: Progressing

## 2024-11-29 NOTE — DISCHARGE INSTRUCTIONS
Please follow-up with PCP within 1 week of hospital discharge    Start taking apixaban 10 mg twice daily for 6 days, then 1 tablet twice daily after that for PE    Start taking cefdinir 1 capsule twice daily for 7 days and doxycycline 1 tablet twice daily for 7 days for pneumonia    Can use probiotics tablets twice daily with meals to prevent diarrhea from antibiotics.    Continue taking home medications as prescribed

## 2024-11-29 NOTE — PROGRESS NOTES
LIZABETH BURGOS PCP transitional care appointment has been scheduled with Dr. Steffen Hays on 1/6/25 at 1430. Pending patient discharge. Rachelle Redd, Care Management Assistant

## 2024-11-29 NOTE — PROGRESS NOTES
Bedside shift change report given to Jeffry RN (oncoming nurse) by Cha RAMOS (offgoing nurse). Report included the following information Nurse Handoff Report, Index, Intake/Output, MAR, Recent Results, Med Rec Status, Cardiac Rhythm  , and Alarm Parameters.          End of Shift Note    Bedside shift change report given to RN (oncoming nurse) by Jeffry Fajardo RN (offgoing nurse).  Report included the following information SBAR, Kardex, Intake/Output, MAR, Recent Results, Med Rec Status, Cardiac Rhythm  , and Alarm Parameters     Shift worked:  7pm-7am     Shift summary and any significant changes:      remained at bedside, lab works done, condition stable, observation and care continues     Concerns for physician to address:  xxxx     Zone phone for oncoming shift:   xxxx       Activity:  Level of Assistance: Dependent, patient does less than 25%  Number times ambulated in hallways past shift: 0  Number of times OOB to chair past shift: 0    Cardiac:   Cardiac Monitoring: Yes      Cardiac Rhythm: Sinus rhythm    Access:  Current line(s): PIV     Genitourinary:   Urinary Status: External catheter, Voiding    Respiratory:   O2 Device: Nasal cannula  Chronic home O2 use?: YES  Incentive spirometer at bedside: YES    GI:  Last BM (including prior to admit): 11/25/24  Current diet:  ADULT DIET; Clear Liquid  Passing flatus: YES    Pain Management:   Patient states pain is manageable on current regimen: YES    Skin:  August Scale Score: 16  Interventions: Wound Offloading (Prevention Methods): Repositioning, Pillows, Elevate heels    Patient Safety:  Fall Risk: Nursing Judgement-Fall Risk High(Add Comments): Yes  Fall Risk Interventions  Nursing Judgement-Fall Risk High(Add Comments): Yes  Toilet Every 2 Hours-In Advance of Need: Yes  Hourly Visual Checks: Awake, In bed  Fall Visual Posted: Armband, Socks  Room Door Open: Yes  Alarm On: Bed  Patient Moved Closer to Nursing Station: No    Active

## 2024-11-29 NOTE — FLOWSHEET NOTE
Patient to be discharged home. Pt is alert and oriented with no complaints of pain or SOB from baseline. IV discontinued and all belongings packed at bedside, vitals stable. Discharge instructions reviewed with patient including medication review and outpatient follow up. Patient verbalized understanding. Transportation set up.

## 2024-11-29 NOTE — PROGRESS NOTES
Speech LAnguage Pathology EVALUATION/DISCHARGE    Patient: Kamilla Lantigua (89 y.o. female)  Date: 11/29/2024  Primary Diagnosis: Respiratory failure with hypoxia and hypercapnia, unspecified chronicity [J96.91, J96.92]       Precautions:                     ASSESSMENT :  The patient presents with a functional oropharyngeal swallow. Patient's mastication assessed to be grossly functional given edentulous status. Patient reports she typically wears upper dentures. Patient tolerated single and sequential sips of thin liquid without difficulty. No overt signs of aspiration observed with any consistency trialed.    Patient will be discharged from skilled speech-language pathology services at this time.     PLAN :  Recommendations and Planned Interventions:  Diet: Easy to chew and thin liquids  -- Medication as tolerated  -- Can advance to Regular consistency once patient's dentures are in house  -- Alternate bites/sips as needed       Acute SLP Services: No, patient will be discharged from acute skilled speech-language pathology at this time.  Discharge Recommendations: No, additional SLP treatment not indicated at discharge     SUBJECTIVE:   Patient stated, “I usually wear uppers.”    OBJECTIVE:     Past Medical History:   Diagnosis Date    Arrhythmia     PALPITATIONS    Bronchitis     Chronic pain     RT. SHOULDER    Diabetes (HCC)     TYPE II    History of blood transfusion 1960'S    AYAKA DE LEON, NO REACTION     Past Surgical History:   Procedure Laterality Date    DILATION AND CURETTAGE OF UTERUS  1960'S    HIP SURGERY Right 8/22/2023    HIP HEMIARTHROPLASTY performed by Darren Davis DO at [facility] MAIN OR    HYSTERECTOMY (CERVIX STATUS UNKNOWN)  1984    TONSILLECTOMY  1956    UROLOGICAL SURGERY  2009    BLADDER TUCK     Prior Level of Function/Home Situation:   Social/Functional History  Lives With: Son, Significant other  Type of Home: House  Bathroom Accessibility: Accessible  Home Equipment: Oxygen, Cane,

## 2024-11-29 NOTE — PROGRESS NOTES
Palliative Medicine  Patient Name: Kamilla Lantigua  YOB: 1935  MRN: [id number]  Age: 89 y.o.  Gender: female    Date of Initial Consult: 11/27/2024  Date of Service: 11/29/2024  Time: 11:15 AM  Provider: Ramona Kauffman MD  Hospital Day: 4  Admit Date: 11/26/2024  Referring Provider: Camilla Ku MD    Reasons for Consultation:  Goals of Care    HISTORY OF PRESENT ILLNESS (HPI):   Kamilla Lantigua is a 89 y.o. female with a past medical history of pAfib, MDT2, some undefined Pulmonary diease on 2-3 liters NC at baseline (was followed by pulm in the past but her doc passed away and she never established care with a new one) and chronic pain , who was admitted on 11/26/2024 from home with a diagnosis of acute hypercapnic and hypoxic respiratory failure with metabolic encephalopathy.     She was put on Bipap overnight, and intubation was considered due to her being unresponsive, but she is slightly arousable now, not opening her eyes but will nod in response to occasional questions.     PALLIATIVE DIAGNOSES:    Acute hypoxic respiratory failure  DNR conversation  Goals of care  Frailty  Somnolence    ASSESSMENT AND PLAN:   11/29  -Patient seen at bedside along with her .  Both very happy that she is doing well, back to her baseline.  Eager for discharge.  -Discussed signing a D DNR,  wants to wait until the son gets here so he can sign.  -Palliative care will sign off    -11/28-patient being transferred out of ICU due to good clinical improvement.  Now alert and resting comfortably.  Minimally aware of how sick she was yesterday, feels well, agreeable for BiPAP as needed.  Wants to recover well so she can return home.   was not at bedside.  We will continue to follow    ----  11/27  After speaking with the ICU attending, I met with Mr Lantigua.  I offered to have his son on the phone, but he said he was at work and would discuss with him later.   We reviewed their conversation  Caregiver Present/Available [] No Caregiver [] Pt Lives at Facility  If \"Yes\" to discuss with social work during IDT    Anticipatory grief assessment:   [x] Normal  / [] Maladaptive     If \"Maladaptive\" to discuss with social work during IDT    ESAS Anxiety: Anxiety Score: Not anxious    ESAS Depression: Depression Score: Not depressed        LAB AND IMAGING FINDINGS:   Objective data reviewed:  labs, images, records, medication use, vitals, and chart     FINAL COMMENTS   Thank you for allowing Palliative Medicine to participate in the care of Kamilla FERREIRA Lantigua.    Only check if applicable and billing time based rather than MDM  [] The total encounter time on this service date was ____ minutes which was spent performing a face-to-face encounter and personally completing the provider-level activities documented in the note. This includes time spent prior to the visit and after the visit in direct care of the patient. This time does not include time spent in any separately reportable services.    Electronically signed by   Ramona Kauffman MD  Palliative Care Team  on 11/29/2024 at 11:15 AM

## 2024-11-29 NOTE — PROGRESS NOTES
End of Shift Note    Bedside shift change report given to Jeffry RAMOS(oncoming nurse) by Cha Tiawri RN (offgoing nurse).  Report included the following information SBAR    Shift worked:  10a-7pm     Shift summary and any significant changes:     Received pt from CCU. Pt pleasantly confused. ABX given. No adverse reactions noted. Ongoing care provided.     Concerns for physician to address:       Zone phone for oncoming shift:          Activity:  Level of Assistance: Dependent, patient does less than 25%  Number times ambulated in hallways past shift: 0  Number of times OOB to chair past shift: 0    Cardiac:   Cardiac Monitoring: Yes      Cardiac Rhythm: Sinus rhythm    Access:  Current line(s): PIV     Genitourinary:   Urinary Status: External catheter    Respiratory:   O2 Device: Nasal cannula  Chronic home O2 use?: NO  Incentive spirometer at bedside: NO    GI:  Last BM (including prior to admit): 11/25/24  Current diet:  ADULT DIET; Clear Liquid  Passing flatus: YES    Pain Management:   Patient states pain is manageable on current regimen: YES    Skin:  August Scale Score: 16  Interventions: Wound Offloading (Prevention Methods): Pillows, Elevate heels, Bed, pressure redistribution/air, Turning, Repositioning    Patient Safety:  Fall Risk: Nursing Judgement-Fall Risk High(Add Comments): Yes  Fall Risk Interventions  Nursing Judgement-Fall Risk High(Add Comments): Yes  Toilet Every 2 Hours-In Advance of Need: Yes  Hourly Visual Checks: Awake, In bed  Fall Visual Posted: Armband, Socks  Room Door Open: Yes  Alarm On: Bed  Patient Moved Closer to Nursing Station: No    Active Consults:   IP CONSULT TO PALLIATIVE CARE    Length of Stay:  Expected LOS: 5  Actual LOS: 1    Cha Tiwari RN

## 2024-11-29 NOTE — CARE COORDINATION
Care Management Initial Assessment       RUR: 15%  Readmission? No  1st IM letter given? Yes - will need 2nd IM letter  1st  letter given: No    Chart reviewed. Patient transferred from CCU to unit. Has been using oxygen. Palliative Care has been consulted.    CM spoke with son, via phone. Jason Christianson [phone] . States his mother/patient and father live with him. He has been assisting his mother and father able to help, too. He has all the equipment needed at home. He does not want home health for his mother. She does not have a PCP, so asking CM assistance. CM spoke with CM assistant about attempting to locate a PCP. Son states that he can transport his mother home. She has oxygen, that she has been using during the night. He will be speaking with the doctor about his mother returning home under his care.    CM will follow.     11/29/24 7135   Service Assessment   Patient Orientation Other (see comment)  (son states can have minor mentation difficulties)   Cognition Other (see comment)  (memory deficits reported)   History Provided By Child/Family  (son, Jason Christianson)   Primary Caregiver Family  (son or )   Support Systems Spouse/Significant Other;Children   Patient's Healthcare Decision Maker is: Patient Declined (Legal Next of Kin Remains as Decision Maker)   PCP Verified by CM No   Prior Functional Level Independent in ADLs/IADLs  (son states required little assist, prior to hospitalization)   Current Functional Level Other (see comment)  (would need therapy eval to determine, but told she is weaker than baseline)   Can patient return to prior living arrangement Yes   Ability to make needs known: Other (see comment)  (uncertain how clear she is now)   Family able to assist with home care needs: Yes   Would you like for me to discuss the discharge plan with any other family members/significant others, and if so, who? Yes  (son and  assist her)   Financial Resources Medicare

## 2024-11-29 NOTE — PROGRESS NOTES
Pt able to perform O2 challenge walk test with nursing staff. At rest patient's O2 Sats on room air measuring 92%. Pt able to ambulate group home down [place] with nursing and walker assistance on room air. With movement, O2 Sat's at 95% on room air. Upon return to Pt room O2 measuring 94%. Pt reports no SOB during ambulation.    Sepsis BPA triggered, Dr. Pavon made aware, no new orders received

## 2024-12-01 LAB
BACTERIA SPEC CULT: NORMAL
FLUID CULTURE: NORMAL
Lab: NORMAL
ORGANISM ID: NORMAL
S PNEUM AG SPEC QL LA: NEGATIVE
SERVICE CMNT-IMP: NORMAL
SPECIMEN SOURCE: NORMAL
SPECIMEN: NORMAL

## 2024-12-01 NOTE — DISCHARGE SUMMARY
Discharge Summary    Name: Kamilla Lantigua  [id number]  YOB: 1935 (Age: 89 y.o.)   Date of Admission: 11/26/2024  Date of Discharge: 11/30/2024  Attending Physician: No att. providers found    Discharge Diagnosis:   Patient Active Problem List   Diagnosis    Hypoxia    Influenza    Closed right hip fracture, initial encounter ([facility])    Atypical pneumonia    Acute hypoxemic respiratory failure    DNR (do not resuscitate) discussion       Consultations:  IP CONSULT TO PALLIATIVE CARE      Brief Admission History/Reason for Admission Per No admitting provider for patient encounter.: 89 y/o F with a PMHx of JERMAN Roberts, Pulmonary diease on 2-3 liters NC at baseline and chronic pain who presented to the ER by her family's concern for AMS and respirary distress.     Patient was admitted with metabolic encephalopathy likely due to acute on chronic hypercarbic hypoxic respiratory failure due to multifocal pneumonia and COPD exacerbation.  Patient was initially admitted to ICU for BiPAP.  Patient was stabilized and transferred to the medicine floors.  And was continued on antibiotics IV for pneumonia, steroids for COPD exacerbation.  Patient was also found to have PE, has history of A-fib, was initially on heparin drip and transition to apixaban oral tablets.  Patient symptoms and clinical status improved, patient was stable and and optimized for discharge.  Patient was discharged on doxycycline and cefdinir to finish pneumonia therapy.  Patient was also discharged on apixaban for PE.      Brief Hospital Course by Main Problems:     Metabolic encephalopathy this suspect secondary to hypercarbia, resolved  -Head CT negative for acute findings  -Minimize narcotics and sedatives  -PT OT     Acute on chronic hypercarbic respiratory failure with hypoxia, resolved  Concern for multifocal pneumonia  COPD Exacerbation , resolved  -Oxygen dependent at baseline 2 -3 L  at home    -Supplemental oxygen to maintain sats greater than 88 to 94%  - pt currently on 0.5% L  sats 88-94%   -Negative for influenza A&B,, negative for COVID  -CT a of the chest shows a small burden partially occlusive thrombus within the left lower lobe, multifocal airspace infiltrates most significantly affecting the left lower lobe consistent with multifocal pneumonia  -Continue  IV ceftriaxone, and  Doxy   - can consider steroids use if does not continue to improve   -Continue DuoNebs     Small LLL irregular artery PE  History of  pAfib   -Continue telemetry monitoring  Continue home dose of digoxin  -Transition off heparin to Apixaban 10 mg twice daily x 14 doses then 5 mg twice daily for the next 3 months     Type 2 diabetes  -Takes glipizide and metformin at home  -Sliding scale insulin       Discharge Exam:  Patient seen and examined by me on discharge day.  Pertinent Findings:  No data found.    Gen:    Not in distress  Chest: Clear lungs  CVS:   Regular rhythm.  No edema  Abd:  Soft, not distended, not tender  Neuro: awake, moving all exts    Discharge/Recent Laboratory Results:  Recent Labs     11/29/24 0448      K 4.1   CL 98   CO2 37*   BUN 5*   CREATININE 0.46*   GLUCOSE 99   CALCIUM 9.2   PHOS 3.9   MG 1.8     Recent Labs     11/29/24 0448   HGB 11.2*   HCT 35.8   WBC 9.1   *       Discharge Medications:     Medication List        START taking these medications      apixaban 5 MG Tabs tablet  Commonly known as: ELIQUIS  Take 2 tablets by mouth 2 times daily for 6 days, THEN 1 tablet 2 times daily.  Start taking on: November 29, 2024     cefdinir 300 MG capsule  Commonly known as: OMNICEF  Take 1 capsule by mouth 2 times daily for 7 days     doxycycline hyclate 100 MG tablet  Commonly known as: VIBRA-TABS  Take 1 tablet by mouth 2 times daily for 7 days     Probiotic (Lactobacillus) Caps  Take 1 capsule by mouth with breakfast and with evening meal for 15 days            CONTINUE taking these

## 2024-12-01 NOTE — PROGRESS NOTES
Physician Progress Note      PATIENT:               LEONILA ESCOBAR  CSN #:                  [id number]  :                       1935  ADMIT DATE:       2024 9:23 PM  DISCH DATE:        2024 4:30 PM  RESPONDING  PROVIDER #:        Amos Cho MD        QUERY TEXT:    Type of Anemia: Please provide further specificity, if known.    Clinical indicators include: blood transfusion, cancer, hgb, hct, chronic   anemia, transfuse, bleeding  Options provided:  -- Anemia due to acute blood loss  -- Anemia due to chronic blood loss  -- Anemia due to iron deficiency  -- Anemia due to postoperative blood loss  -- Anemia due to chronic disease  -- Other - I will add my own diagnosis  -- Disagree - Not applicable / Not valid  -- Disagree - Clinically Unable to determine / Unknown        PROVIDER RESPONSE TEXT:    The patient has anemia due to chronic disease.          QUERY TEXT:    Patient admitted, noted to have paroxysmal atrial fibrillation and is   maintained on Eliquis. If possible, please document in progress notes and   discharge summary if you are evaluating and/or treating any of the following:?  ?  The medical record reflects the following:  Risk Factors:  89to female with pAfib on Eliquis    Clinical Indicators:   MD PN: History of  pAfib    Treatment:  Eliquis 10mg p0  Options provided:  -- Secondary hypercoagulable state in a patient with atrial fibrillation  -- Other - I will add my own diagnosis  -- Disagree - Not applicable / Not valid  -- Disagree - Clinically unable to determine / Unknown  -- Refer to Clinical Documentation Reviewer    PROVIDER RESPONSE TEXT:    This patient has secondary hypercoagulable state in a patient with atrial   fibrillation.    Query created by: Ashwini Suh on 2024 10:03 AM      QUERY TEXT:    Pt admitted with.  Pt noted to have \"concern for multifocal pneumonia\" H&P on   24. If possible, please document in the progress notes and discharge

## 2024-12-02 LAB
BACTERIA SPEC CULT: NORMAL
L PNEUMO1 AG UR QL IA: NEGATIVE
SERVICE CMNT-IMP: NORMAL
SPECIMEN SOURCE: NORMAL

## 2024-12-07 NOTE — PROGRESS NOTES
Physician Progress Note      PATIENT:               LEONILA ESCOBAR  CSN #:                  [id number]  :                       1935  ADMIT DATE:       2024 9:23 PM  DISCH DATE:        2024 4:30 PM  RESPONDING  PROVIDER #:        Amos Cho MD          QUERY TEXT:    Pt admitted. Pt noted to have Pneumonia. If possible, please document in the   progress notes and discharge summary if you are evaluating and /or treating   any of the following:  The medical record reflects the following:  Risk Factors:  89 yo female admitted with pneumonia and COPD    Clinical Indicators:   H&P:  1)Acute on chronic hypercarbic respiratory failure with hypoxia  2) Concern for multifocal pneumonia     DC sum:  Metabolic encephalopathy this suspect secondary to hypercarbia, resolved  Acute on chronic hypercarbic respiratory failure with hypoxia, resolved  Concern for multifocal pneumonia  COPD Exacerbation , resolved     PQ resp: Multifocal pneumonia    24 22:27  WBC: 14.0 (H)    24 23:25  POC Lactic Acid: 0.94     -  RR 18-35    Treatment:  IV Maxipine, Zosyn and Doxycycline, CXR, CTA chest, BIPAP, labs, duonebx  Options provided:  -- Sepsis due to pneumonia, present on admission  -- Pneumonia without Sepsis  -- Other - I will add my own diagnosis  -- Disagree - Not applicable / Not valid  -- Disagree - Clinically unable to determine / Unknown  -- Refer to Clinical Documentation Reviewer    PROVIDER RESPONSE TEXT:    This patient has sepsis due to pneumonia which was present on admission.    Query created by: Ashwini Suh on 2024 8:29 AM      Electronically signed by:  Amos Cho MD 2024 7:22 PM

## 2025-01-30 ENCOUNTER — APPOINTMENT (OUTPATIENT)
Facility: HOSPITAL | Age: 89
DRG: 522 | End: 2025-01-30
Payer: MEDICARE

## 2025-01-30 ENCOUNTER — HOSPITAL ENCOUNTER (INPATIENT)
Facility: HOSPITAL | Age: 89
LOS: 4 days | Discharge: NURSING, REHAB OR LONG-TERM CARE | DRG: 522 | End: 2025-02-03
Attending: EMERGENCY MEDICINE | Admitting: STUDENT IN AN ORGANIZED HEALTH CARE EDUCATION/TRAINING PROGRAM
Payer: MEDICARE

## 2025-01-30 ENCOUNTER — ANESTHESIA EVENT (OUTPATIENT)
Facility: HOSPITAL | Age: 89
End: 2025-01-30
Payer: MEDICARE

## 2025-01-30 DIAGNOSIS — S72.002A CLOSED FRACTURE OF NECK OF LEFT FEMUR, INITIAL ENCOUNTER (HCC): Primary | ICD-10-CM

## 2025-01-30 LAB
ABO + RH BLD: NORMAL
ALBUMIN SERPL-MCNC: 4 G/DL (ref 3.5–5)
ALBUMIN/GLOB SERPL: 1.1 (ref 1.1–2.2)
ALP SERPL-CCNC: 68 U/L (ref 45–117)
ALT SERPL-CCNC: 15 U/L (ref 12–78)
ANION GAP SERPL CALC-SCNC: 3 MMOL/L (ref 2–12)
APPEARANCE UR: CLEAR
APTT PPP: 30 SEC (ref 22.1–31)
APTT PPP: 40.3 SEC (ref 22.1–31)
AST SERPL-CCNC: 14 U/L (ref 15–37)
BACTERIA URNS QL MICRO: NEGATIVE /HPF
BASOPHILS # BLD: 0.04 K/UL (ref 0–0.1)
BASOPHILS NFR BLD: 0.6 % (ref 0–1)
BILIRUB SERPL-MCNC: 0.6 MG/DL (ref 0.2–1)
BILIRUB UR QL: NEGATIVE
BLOOD GROUP ANTIBODIES SERPL: NORMAL
BUN SERPL-MCNC: 16 MG/DL (ref 6–20)
BUN/CREAT SERPL: 27 (ref 12–20)
CALCIUM SERPL-MCNC: 9.2 MG/DL (ref 8.5–10.1)
CHLORIDE SERPL-SCNC: 99 MMOL/L (ref 97–108)
CO2 SERPL-SCNC: 33 MMOL/L (ref 21–32)
COLOR UR: NORMAL
CREAT SERPL-MCNC: 0.59 MG/DL (ref 0.55–1.02)
DIFFERENTIAL METHOD BLD: ABNORMAL
EOSINOPHIL # BLD: 0.08 K/UL (ref 0–0.4)
EOSINOPHIL NFR BLD: 1.1 % (ref 0–7)
EPITH CASTS URNS QL MICRO: NORMAL /LPF
ERYTHROCYTE [DISTWIDTH] IN BLOOD BY AUTOMATED COUNT: 14 % (ref 11.5–14.5)
ERYTHROCYTE [DISTWIDTH] IN BLOOD BY AUTOMATED COUNT: 14 % (ref 11.5–14.5)
GLOBULIN SER CALC-MCNC: 3.7 G/DL (ref 2–4)
GLUCOSE BLD STRIP.AUTO-MCNC: 122 MG/DL (ref 65–117)
GLUCOSE BLD STRIP.AUTO-MCNC: 204 MG/DL (ref 65–117)
GLUCOSE SERPL-MCNC: 152 MG/DL (ref 65–100)
GLUCOSE UR STRIP.AUTO-MCNC: NEGATIVE MG/DL
HCT VFR BLD AUTO: 36.5 % (ref 35–47)
HCT VFR BLD AUTO: 38.7 % (ref 35–47)
HGB BLD-MCNC: 11.5 G/DL (ref 11.5–16)
HGB BLD-MCNC: 12.4 G/DL (ref 11.5–16)
HGB UR QL STRIP: NEGATIVE
HYALINE CASTS URNS QL MICRO: NORMAL /LPF (ref 0–2)
IMM GRANULOCYTES # BLD AUTO: 0.04 K/UL (ref 0–0.04)
IMM GRANULOCYTES NFR BLD AUTO: 0.6 % (ref 0–0.5)
INR PPP: 1.1 (ref 0.9–1.1)
INR PPP: 1.1 (ref 0.9–1.1)
KETONES UR QL STRIP.AUTO: NEGATIVE MG/DL
LEUKOCYTE ESTERASE UR QL STRIP.AUTO: NEGATIVE
LYMPHOCYTES # BLD: 1.49 K/UL (ref 0.8–3.5)
LYMPHOCYTES NFR BLD: 21 % (ref 12–49)
MCH RBC QN AUTO: 28.8 PG (ref 26–34)
MCH RBC QN AUTO: 29 PG (ref 26–34)
MCHC RBC AUTO-ENTMCNC: 31.5 G/DL (ref 30–36.5)
MCHC RBC AUTO-ENTMCNC: 32 G/DL (ref 30–36.5)
MCV RBC AUTO: 90.6 FL (ref 80–99)
MCV RBC AUTO: 91.3 FL (ref 80–99)
MONOCYTES # BLD: 0.44 K/UL (ref 0–1)
MONOCYTES NFR BLD: 6.2 % (ref 5–13)
NEUTS SEG # BLD: 5.01 K/UL (ref 1.8–8)
NEUTS SEG NFR BLD: 70.5 % (ref 32–75)
NITRITE UR QL STRIP.AUTO: NEGATIVE
NRBC # BLD: 0 K/UL (ref 0–0.01)
NRBC # BLD: 0 K/UL (ref 0–0.01)
NRBC BLD-RTO: 0 PER 100 WBC
NRBC BLD-RTO: 0 PER 100 WBC
PH UR STRIP: 8 (ref 5–8)
PLATELET # BLD AUTO: 222 K/UL (ref 150–400)
PLATELET # BLD AUTO: 242 K/UL (ref 150–400)
PMV BLD AUTO: 9.2 FL (ref 8.9–12.9)
PMV BLD AUTO: 9.3 FL (ref 8.9–12.9)
POTASSIUM SERPL-SCNC: 4.4 MMOL/L (ref 3.5–5.1)
PROT SERPL-MCNC: 7.7 G/DL (ref 6.4–8.2)
PROT UR STRIP-MCNC: NEGATIVE MG/DL
PROTHROMBIN TIME: 11.6 SEC (ref 9.2–11.2)
PROTHROMBIN TIME: 11.9 SEC (ref 9.2–11.2)
RBC # BLD AUTO: 4 M/UL (ref 3.8–5.2)
RBC # BLD AUTO: 4.27 M/UL (ref 3.8–5.2)
RBC #/AREA URNS HPF: NORMAL /HPF (ref 0–5)
SERVICE CMNT-IMP: ABNORMAL
SERVICE CMNT-IMP: ABNORMAL
SODIUM SERPL-SCNC: 135 MMOL/L (ref 136–145)
SP GR UR REFRACTOMETRY: 1.01
SPECIMEN EXP DATE BLD: NORMAL
THERAPEUTIC RANGE: ABNORMAL SECS (ref 58–77)
THERAPEUTIC RANGE: NORMAL SECS (ref 58–77)
UFH PPP CHRO-ACNC: 1.02 IU/ML
URINE CULTURE IF INDICATED: NORMAL
UROBILINOGEN UR QL STRIP.AUTO: 0.2 EU/DL (ref 0.2–1)
WBC # BLD AUTO: 12.1 K/UL (ref 3.6–11)
WBC # BLD AUTO: 7.1 K/UL (ref 3.6–11)
WBC URNS QL MICRO: NORMAL /HPF (ref 0–4)

## 2025-01-30 PROCEDURE — 82962 GLUCOSE BLOOD TEST: CPT

## 2025-01-30 PROCEDURE — 71045 X-RAY EXAM CHEST 1 VIEW: CPT

## 2025-01-30 PROCEDURE — 81001 URINALYSIS AUTO W/SCOPE: CPT

## 2025-01-30 PROCEDURE — 73502 X-RAY EXAM HIP UNI 2-3 VIEWS: CPT

## 2025-01-30 PROCEDURE — 85730 THROMBOPLASTIN TIME PARTIAL: CPT

## 2025-01-30 PROCEDURE — 85520 HEPARIN ASSAY: CPT

## 2025-01-30 PROCEDURE — 86900 BLOOD TYPING SEROLOGIC ABO: CPT

## 2025-01-30 PROCEDURE — 80053 COMPREHEN METABOLIC PANEL: CPT

## 2025-01-30 PROCEDURE — 86901 BLOOD TYPING SEROLOGIC RH(D): CPT

## 2025-01-30 PROCEDURE — 86850 RBC ANTIBODY SCREEN: CPT

## 2025-01-30 PROCEDURE — 93005 ELECTROCARDIOGRAM TRACING: CPT | Performed by: EMERGENCY MEDICINE

## 2025-01-30 PROCEDURE — 94640 AIRWAY INHALATION TREATMENT: CPT

## 2025-01-30 PROCEDURE — 85027 COMPLETE CBC AUTOMATED: CPT

## 2025-01-30 PROCEDURE — 85610 PROTHROMBIN TIME: CPT

## 2025-01-30 PROCEDURE — 85025 COMPLETE CBC W/AUTO DIFF WBC: CPT

## 2025-01-30 PROCEDURE — 6370000000 HC RX 637 (ALT 250 FOR IP): Performed by: EMERGENCY MEDICINE

## 2025-01-30 PROCEDURE — 6360000002 HC RX W HCPCS: Performed by: ORTHOPAEDIC SURGERY

## 2025-01-30 PROCEDURE — 6370000000 HC RX 637 (ALT 250 FOR IP): Performed by: STUDENT IN AN ORGANIZED HEALTH CARE EDUCATION/TRAINING PROGRAM

## 2025-01-30 PROCEDURE — 99285 EMERGENCY DEPT VISIT HI MDM: CPT

## 2025-01-30 PROCEDURE — 1100000003 HC PRIVATE W/ TELEMETRY

## 2025-01-30 PROCEDURE — 36415 COLL VENOUS BLD VENIPUNCTURE: CPT

## 2025-01-30 PROCEDURE — 6360000002 HC RX W HCPCS: Performed by: EMERGENCY MEDICINE

## 2025-01-30 RX ORDER — HEPARIN SODIUM 1000 [USP'U]/ML
2000 INJECTION, SOLUTION INTRAVENOUS; SUBCUTANEOUS PRN
Status: DISCONTINUED | OUTPATIENT
Start: 2025-01-30 | End: 2025-02-01

## 2025-01-30 RX ORDER — ACETAMINOPHEN 650 MG/1
650 SUPPOSITORY RECTAL EVERY 6 HOURS PRN
Status: DISCONTINUED | OUTPATIENT
Start: 2025-01-30 | End: 2025-02-03 | Stop reason: HOSPADM

## 2025-01-30 RX ORDER — POLYETHYLENE GLYCOL 3350 17 G/17G
17 POWDER, FOR SOLUTION ORAL DAILY PRN
Status: DISCONTINUED | OUTPATIENT
Start: 2025-01-30 | End: 2025-02-03 | Stop reason: HOSPADM

## 2025-01-30 RX ORDER — HEPARIN SODIUM 10000 [USP'U]/100ML
5-30 INJECTION, SOLUTION INTRAVENOUS CONTINUOUS
Status: DISCONTINUED | OUTPATIENT
Start: 2025-01-30 | End: 2025-02-01

## 2025-01-30 RX ORDER — IPRATROPIUM BROMIDE AND ALBUTEROL SULFATE 2.5; .5 MG/3ML; MG/3ML
1 SOLUTION RESPIRATORY (INHALATION)
Status: DISCONTINUED | OUTPATIENT
Start: 2025-01-30 | End: 2025-02-01

## 2025-01-30 RX ORDER — HEPARIN SODIUM 1000 [USP'U]/ML
4000 INJECTION, SOLUTION INTRAVENOUS; SUBCUTANEOUS PRN
Status: DISCONTINUED | OUTPATIENT
Start: 2025-01-30 | End: 2025-02-01

## 2025-01-30 RX ORDER — GLIPIZIDE 5 MG/1
2.5 TABLET ORAL 2 TIMES DAILY
Status: DISCONTINUED | OUTPATIENT
Start: 2025-01-30 | End: 2025-02-03 | Stop reason: HOSPADM

## 2025-01-30 RX ORDER — SODIUM CHLORIDE 9 MG/ML
INJECTION, SOLUTION INTRAVENOUS PRN
Status: DISCONTINUED | OUTPATIENT
Start: 2025-01-30 | End: 2025-02-03 | Stop reason: HOSPADM

## 2025-01-30 RX ORDER — GLUCAGON 1 MG/ML
1 KIT INJECTION PRN
Status: DISCONTINUED | OUTPATIENT
Start: 2025-01-30 | End: 2025-02-03 | Stop reason: HOSPADM

## 2025-01-30 RX ORDER — DIGOXIN 125 MCG
0.12 TABLET ORAL DAILY
Status: DISCONTINUED | OUTPATIENT
Start: 2025-01-30 | End: 2025-01-30

## 2025-01-30 RX ORDER — MORPHINE SULFATE 4 MG/ML
4 INJECTION, SOLUTION INTRAMUSCULAR; INTRAVENOUS EVERY 4 HOURS PRN
Status: DISCONTINUED | OUTPATIENT
Start: 2025-01-30 | End: 2025-02-01

## 2025-01-30 RX ORDER — SODIUM CHLORIDE 0.9 % (FLUSH) 0.9 %
5-40 SYRINGE (ML) INJECTION PRN
Status: DISCONTINUED | OUTPATIENT
Start: 2025-01-30 | End: 2025-02-03 | Stop reason: SDUPTHER

## 2025-01-30 RX ORDER — SODIUM CHLORIDE 0.9 % (FLUSH) 0.9 %
5-40 SYRINGE (ML) INJECTION EVERY 12 HOURS SCHEDULED
Status: DISCONTINUED | OUTPATIENT
Start: 2025-01-30 | End: 2025-02-03 | Stop reason: SDUPTHER

## 2025-01-30 RX ORDER — HEPARIN SODIUM 1000 [USP'U]/ML
40 INJECTION, SOLUTION INTRAVENOUS; SUBCUTANEOUS PRN
Status: DISCONTINUED | OUTPATIENT
Start: 2025-01-30 | End: 2025-01-30

## 2025-01-30 RX ORDER — ACETAMINOPHEN 325 MG/1
650 TABLET ORAL EVERY 6 HOURS PRN
Status: DISCONTINUED | OUTPATIENT
Start: 2025-01-30 | End: 2025-02-03 | Stop reason: HOSPADM

## 2025-01-30 RX ORDER — ALBUTEROL SULFATE 0.83 MG/ML
2.5 SOLUTION RESPIRATORY (INHALATION) 4 TIMES DAILY PRN
Status: DISCONTINUED | OUTPATIENT
Start: 2025-01-30 | End: 2025-02-03 | Stop reason: HOSPADM

## 2025-01-30 RX ORDER — ACETAMINOPHEN 500 MG
1000 TABLET ORAL
Status: COMPLETED | OUTPATIENT
Start: 2025-01-30 | End: 2025-01-30

## 2025-01-30 RX ORDER — ONDANSETRON 4 MG/1
4 TABLET, ORALLY DISINTEGRATING ORAL EVERY 8 HOURS PRN
Status: DISCONTINUED | OUTPATIENT
Start: 2025-01-30 | End: 2025-02-03 | Stop reason: HOSPADM

## 2025-01-30 RX ORDER — DEXTROSE MONOHYDRATE 100 MG/ML
INJECTION, SOLUTION INTRAVENOUS CONTINUOUS PRN
Status: DISCONTINUED | OUTPATIENT
Start: 2025-01-30 | End: 2025-02-03 | Stop reason: HOSPADM

## 2025-01-30 RX ORDER — HEPARIN SODIUM 1000 [USP'U]/ML
80 INJECTION, SOLUTION INTRAVENOUS; SUBCUTANEOUS PRN
Status: DISCONTINUED | OUTPATIENT
Start: 2025-01-30 | End: 2025-01-30

## 2025-01-30 RX ORDER — ONDANSETRON 2 MG/ML
4 INJECTION INTRAMUSCULAR; INTRAVENOUS EVERY 6 HOURS PRN
Status: DISCONTINUED | OUTPATIENT
Start: 2025-01-30 | End: 2025-02-03 | Stop reason: HOSPADM

## 2025-01-30 RX ORDER — DIGOXIN 125 MCG
0.12 TABLET ORAL
Status: DISCONTINUED | OUTPATIENT
Start: 2025-01-30 | End: 2025-02-03 | Stop reason: HOSPADM

## 2025-01-30 RX ORDER — INSULIN LISPRO 100 [IU]/ML
0-4 INJECTION, SOLUTION INTRAVENOUS; SUBCUTANEOUS
Status: DISCONTINUED | OUTPATIENT
Start: 2025-01-30 | End: 2025-02-02

## 2025-01-30 RX ADMIN — ACETAMINOPHEN 1000 MG: 500 TABLET, FILM COATED ORAL at 14:05

## 2025-01-30 RX ADMIN — DIGOXIN 0.12 MG: 125 TABLET ORAL at 20:47

## 2025-01-30 RX ADMIN — INSULIN LISPRO 1 UNITS: 100 INJECTION, SOLUTION INTRAVENOUS; SUBCUTANEOUS at 20:47

## 2025-01-30 RX ADMIN — MORPHINE SULFATE 4 MG: 4 INJECTION, SOLUTION INTRAMUSCULAR; INTRAVENOUS at 22:16

## 2025-01-30 RX ADMIN — IPRATROPIUM BROMIDE AND ALBUTEROL SULFATE 1 DOSE: .5; 3 SOLUTION RESPIRATORY (INHALATION) at 20:17

## 2025-01-30 RX ADMIN — HEPARIN SODIUM AND DEXTROSE 12 UNITS/KG/HR: 10000; 5 INJECTION INTRAVENOUS at 17:43

## 2025-01-30 ASSESSMENT — PAIN DESCRIPTION - ONSET: ONSET: SUDDEN

## 2025-01-30 ASSESSMENT — PAIN DESCRIPTION - ORIENTATION: ORIENTATION: LEFT

## 2025-01-30 ASSESSMENT — PAIN - FUNCTIONAL ASSESSMENT: PAIN_FUNCTIONAL_ASSESSMENT: PREVENTS OR INTERFERES SOME ACTIVE ACTIVITIES AND ADLS

## 2025-01-30 ASSESSMENT — PAIN SCALES - GENERAL
PAINLEVEL_OUTOF10: 8
PAINLEVEL_OUTOF10: 4
PAINLEVEL_OUTOF10: 2

## 2025-01-30 ASSESSMENT — PAIN DESCRIPTION - DESCRIPTORS: DESCRIPTORS: DISCOMFORT

## 2025-01-30 ASSESSMENT — PAIN DESCRIPTION - LOCATION: LOCATION: HIP

## 2025-01-30 ASSESSMENT — PAIN DESCRIPTION - FREQUENCY: FREQUENCY: INTERMITTENT

## 2025-01-30 ASSESSMENT — PAIN DESCRIPTION - PAIN TYPE: TYPE: ACUTE PAIN

## 2025-01-30 ASSESSMENT — COPD QUESTIONNAIRES: CAT_SEVERITY: SEVERE

## 2025-01-30 NOTE — H&P
Hospitalist Admission Note    NAME:   Kamilla Lantigua   : 1935   MRN: [id number]     Date/Time: 2025 3:55 PM    Patient PCP: No primary care provider on file.    ______________________________________________________________________  Given the patient's current clinical presentation, I have a high level of concern for decompensation if discharged from the emergency department.  Complex decision making was performed, which includes reviewing the patient's available past medical records, laboratory results, and x-ray films.       My assessment of this patient's clinical condition and my plan of care is as follows.    Assessment / Plan:    Left displaced femoral neck fracture   S/p mechanical ground level  fall     Plan :  Admit to tele monitoring   Evaluated by Ortho: Plan for left hip hemiarthroplasty on   N.p.o. after midnight  Continue with heparin drip-hold in a.m. around 4 am for anticipated surgery tomorrow   Pain management  PT/OT eval after the procedure    Pre-op cardiac risk assessment:    UA pending   EKG- sinus rhythm, non specific st- t wave changes   CXR - Some residual nodular airspace disease is present at the left lung base;follow-up to complete resolution is recommended. No evidence of new pneumonia or  pleural effusion.    Pt evaluated using revised cardiac risk index and is felt to be moderate cardiovascular risk for intermediate with a 10.2% risk for major complications based on these criteria.    Patient has h/o  COPD , not in exacerbation at present. Had required BiPAP in past. CO2 better -33 compare to prior . AAOX3  with no acute chest pain or SOB. May need close monitoring post anesthesia .  Has h/o Paroxysmal Afib- at present at sinus rate controlled . No acute signs of Heart failure .   She had prior hip surgery performed with no complications after anesthesia.   She has multiple co morbidities increasing risk of surgery . However benefits outweighs risk . Plan for    metFORMIN (GLUCOPHAGE) 500 MG tablet Take 2 tablets by mouth 2 times daily (with meals)    Automatic Reconciliation, Ar         Objective:   VITALS:    Patient Vitals for the past 24 hrs:   BP Temp Temp src Pulse Resp SpO2   25 1530 (!) 141/58 -- -- -- -- 91 %   25 1515 (!) 150/56 -- -- -- -- 94 %   25 1511 -- -- -- -- -- 95 %   25 1500 (!) 160/63 -- -- -- -- --   25 1445 (!) 172/61 -- -- -- -- 95 %   25 1430 (!) 198/80 -- -- -- -- 95 %   25 1415 (!) 183/69 -- -- -- -- 100 %   25 1400 (!) 175/58 -- -- -- -- 96 %   25 1324 -- -- -- -- -- 94 %   25 1315 (!) 211/78 98 °F (36.7 °C) Oral 78 18 95 %       Temp (24hrs), Av °F (36.7 °C), Min:98 °F (36.7 °C), Max:98 °F (36.7 °C)             Wt Readings from Last 12 Encounters:   24 57.4 kg (126 lb 8.7 oz)   24 60.1 kg (132 lb 7.9 oz)   24 63.5 kg (139 lb 15.9 oz)   23 63.5 kg (140 lb)   23 59 kg (130 lb)   23 61.9 kg (136 lb 7.4 oz)   22 63.5 kg (140 lb)         PHYSICAL EXAM:  General:    Alert, cooperative, appears stated age.     Lungs:   Basal crackles prominent on left base , rest ausculted area clear   Chest wall:  No tenderness.  No accessory muscle use.  Heart:   Regular  rhythm,  No  Murmur.   Trace edema  Abdomen:   Soft, NT. ND  BS+  Extremities: Shortening of left LE   Neurologic: No facial asymmetry. No aphasia or slurred speech. Symmetrical strength, Sensation grossly intact. AAOx3 , hearing impairment .         LAB DATA REVIEWED:    Recent Results (from the past 12 hour(s))   EKG 12 Lead    Collection Time: 25  2:09 PM   Result Value Ref Range    Ventricular Rate 64 BPM    Atrial Rate 65 BPM    QRS Duration 70 ms    Q-T Interval 382 ms    QTc Calculation (Bazett) 394 ms    R Axis -2 degrees    T Axis 22 degrees    Diagnosis       Undetermined rhythm  Nonspecific ST abnormality  Abnormal ECG  When compared with ECG of 2024 22:35,  Current

## 2025-01-30 NOTE — ED PROVIDER NOTES
[facility] EMERGENCY DEPARTMENT  EMERGENCY DEPARTMENT ENCOUNTER       Pt Name: Kamilla Lantigua  MRN: [id number]  Birthdate 1935  Date of evaluation: 1/30/2025  Provider: Juan Henry MD   PCP: No primary care provider on file.  Note Started: 3:15 PM 1/30/25     CHIEF COMPLAINT       Chief Complaint   Patient presents with    Fall    Hip Pain     Pt BIBEMS from home cc of mechanical GLF this am and subsequent l hip pain. On eliquis, no headstrike, no LOC. Left hip is shortened and externally rotated.         HISTORY OF PRESENT ILLNESS: 1 or more elements      History From: Patient, History limited by: None     Kamilla Lantigua is a 89 y.o. female with a history of PE on eliquis, prior hip fracture 1 year ago, repaired who presents with GLF and L hip pain. She reports she fell at home, landing on her left hip. Unsure why she fell, though recalls slipping on floor. No head trauma.      Nursing Notes were all reviewed and agreed with or any disagreements were addressed in the HPI.     REVIEW OF SYSTEMS        Positives and Pertinent negatives as per HPI.    PAST HISTORY     Past Medical History:  Past Medical History:   Diagnosis Date    Arrhythmia     PALPITATIONS    Bronchitis     Chronic pain     RT. SHOULDER    Diabetes (HCC)     TYPE II    History of blood transfusion 1960'S    AYAKA DE LEON, NO REACTION       Past Surgical History:  Past Surgical History:   Procedure Laterality Date    DILATION AND CURETTAGE OF UTERUS  1960'S    HIP SURGERY Right 8/22/2023    HIP HEMIARTHROPLASTY performed by Darren Davis DO at [facility] MAIN OR    HYSTERECTOMY (CERVIX STATUS UNKNOWN)  1984    TONSILLECTOMY  1956    UROLOGICAL SURGERY  2009    BLADDER TUCK       Family History:  Family History   Problem Relation Age of Onset    Cancer Mother         UTERINE    Heart Disease Father        Social History:  Social History     Tobacco Use    Smoking status: Never    Smokeless tobacco: Never   Substance Use Topics     70 ms    Q-T Interval 382 ms    QTc Calculation (Bazett) 394 ms    R Axis -2 degrees    T Axis 22 degrees    Diagnosis       Undetermined rhythm  Nonspecific ST abnormality  Abnormal ECG  When compared with ECG of 26-NOV-2024 22:35,  Current undetermined rhythm precludes rhythm comparison, needs review     CBC with Auto Differential    Collection Time: 01/30/25  2:10 PM   Result Value Ref Range    WBC 7.1 3.6 - 11.0 K/uL    RBC 4.00 3.80 - 5.20 M/uL    Hemoglobin 11.5 11.5 - 16.0 g/dL    Hematocrit 36.5 35.0 - 47.0 %    MCV 91.3 80.0 - 99.0 FL    MCH 28.8 26.0 - 34.0 PG    MCHC 31.5 30.0 - 36.5 g/dL    RDW 14.0 11.5 - 14.5 %    Platelets 222 150 - 400 K/uL    MPV 9.2 8.9 - 12.9 FL    Nucleated RBCs 0.0 0  WBC    nRBC 0.00 0.00 - 0.01 K/uL    Neutrophils % 70.5 32.0 - 75.0 %    Lymphocytes % 21.0 12.0 - 49.0 %    Monocytes % 6.2 5.0 - 13.0 %    Eosinophils % 1.1 0.0 - 7.0 %    Basophils % 0.6 0.0 - 1.0 %    Immature Granulocytes % 0.6 (H) 0.0 - 0.5 %    Neutrophils Absolute 5.01 1.80 - 8.00 K/UL    Lymphocytes Absolute 1.49 0.80 - 3.50 K/UL    Monocytes Absolute 0.44 0.00 - 1.00 K/UL    Eosinophils Absolute 0.08 0.00 - 0.40 K/UL    Basophils Absolute 0.04 0.00 - 0.10 K/UL    Immature Granulocytes Absolute 0.04 0.00 - 0.04 K/UL    Differential Type AUTOMATED     Protime-INR    Collection Time: 01/30/25  2:10 PM   Result Value Ref Range    INR 1.1 0.9 - 1.1      Protime 11.9 (H) 9.2 - 11.2 sec       EKG: If performed, independent interpretation documented below in the MDM section     RADIOLOGY:  Non-plain film images such as CT, Ultrasound and MRI are read by the radiologist. Plain radiographic images are visualized and preliminarily interpreted by the ED Provider with the findings documented in the MDM section.     Interpretation per the Radiologist below, if available at the time of this note:     XR HIP 2-3 VW W PELVIS LEFT   Final Result   Fracture left femoral neck      Electronically signed by CRISTELA DHILLON,

## 2025-01-30 NOTE — ED NOTES
Attempted to call report, at this time Ortho RN unavailable.  Provided call back number, ED charge RN aware.

## 2025-01-30 NOTE — PROGRESS NOTES
Brief orthopedic note:    89-year-old female with left displaced femoral neck fracture after ground-level fall.    Plan:  -Medical admission for preoperative clearance and medical management.  -Bedrest  -N.p.o. at midnight  -Plan for left hip hemiarthroplasty by Dr. Mayen tomorrow if medically cleared.  -Hold blood thinners in AM.  -Informed consent obtained.  -Full consult note to follow tomorrow    Dr. Mayen aware and agrees.  AYAKA Mckee

## 2025-01-30 NOTE — ANESTHESIA PRE PROCEDURE
Department of Anesthesiology  Preprocedure Note       Name:  Kamilla Lantigua   Age:  89 y.o.  :  1935                                          MRN:  [id number]         Date:  2025      Surgeon: Surgeon(s):  Frederick Mayen MD    Procedure: Procedure(s):  LEFT HIP HEMIARTHROPLASTY ANTERIOR    Medications prior to admission:   Prior to Admission medications    Medication Sig Start Date End Date Taking? Authorizing Provider   apixaban (ELIQUIS) 5 MG TABS tablet Take 2 tablets by mouth 2 times daily for 6 days, THEN 1 tablet 2 times daily. 11/29/24 3/5/25  Amos Cho MD   albuterol sulfate HFA (VENTOLIN HFA) 108 (90 Base) MCG/ACT inhaler Inhale 2 puffs into the lungs 4 times daily as needed for Wheezing 24   Aliya Carlisle MD   ipratropium 0.5 mg-albuterol 2.5 mg (DUONEB) 0.5-2.5 (3) MG/3ML SOLN nebulizer solution Inhale 3 mLs into the lungs every 4 hours as needed for Shortness of Breath 24   Aliya Carlisle MD   digoxin (LANOXIN) 250 MCG tablet Take 0.5 tablets by mouth daily    Automatic Reconciliation, Ar   glipiZIDE (GLUCOTROL) 5 MG tablet Take 0.5 tablets by mouth 2 times daily    Automatic Reconciliation, Ar   metFORMIN (GLUCOPHAGE) 500 MG tablet Take 2 tablets by mouth 2 times daily (with meals)    Automatic Reconciliation, Ar       Current medications:    Current Facility-Administered Medications   Medication Dose Route Frequency Provider Last Rate Last Admin   • morphine sulfate (PF) injection 4 mg  4 mg IntraVENous Q4H PRN Juan Henry MD       • albuterol (PROVENTIL) (2.5 MG/3ML) 0.083% nebulizer solution 2.5 mg  2.5 mg Nebulization 4x Daily PRN Debbie Douglas MD       • [Held by provider] glipiZIDE (GLUCOTROL) tablet 2.5 mg  2.5 mg Oral BID Debbie Douglas MD       • ipratropium 0.5 mg-albuterol 2.5 mg (DUONEB) nebulizer solution 1 Dose  1 Dose Inhalation 4x Daily RT Debbie Douglas MD       • [Held by provider] metFORMIN (GLUCOPHAGE) tablet 1,000 mg  1,000 mg Oral BID WC

## 2025-01-31 ENCOUNTER — ANESTHESIA (OUTPATIENT)
Facility: HOSPITAL | Age: 89
End: 2025-01-31
Payer: MEDICARE

## 2025-01-31 ENCOUNTER — APPOINTMENT (OUTPATIENT)
Facility: HOSPITAL | Age: 89
DRG: 522 | End: 2025-01-31
Payer: MEDICARE

## 2025-01-31 LAB
APTT PPP: 52.7 SEC (ref 22.1–31)
EKG ATRIAL RATE: 65 BPM
EKG DIAGNOSIS: NORMAL
EKG Q-T INTERVAL: 382 MS
EKG QRS DURATION: 70 MS
EKG QTC CALCULATION (BAZETT): 394 MS
EKG R AXIS: -2 DEGREES
EKG T AXIS: 22 DEGREES
EKG VENTRICULAR RATE: 64 BPM
GLUCOSE BLD STRIP.AUTO-MCNC: 167 MG/DL (ref 65–117)
GLUCOSE BLD STRIP.AUTO-MCNC: 181 MG/DL (ref 65–117)
GLUCOSE BLD STRIP.AUTO-MCNC: 190 MG/DL (ref 65–117)
GLUCOSE BLD STRIP.AUTO-MCNC: 220 MG/DL (ref 65–117)
GLUCOSE BLD STRIP.AUTO-MCNC: 267 MG/DL (ref 65–117)
SERVICE CMNT-IMP: ABNORMAL
THERAPEUTIC RANGE: ABNORMAL SECS (ref 58–77)

## 2025-01-31 PROCEDURE — 82962 GLUCOSE BLOOD TEST: CPT

## 2025-01-31 PROCEDURE — 6370000000 HC RX 637 (ALT 250 FOR IP): Performed by: STUDENT IN AN ORGANIZED HEALTH CARE EDUCATION/TRAINING PROGRAM

## 2025-01-31 PROCEDURE — 0SRS0J9 REPLACEMENT OF LEFT HIP JOINT, FEMORAL SURFACE WITH SYNTHETIC SUBSTITUTE, CEMENTED, OPEN APPROACH: ICD-10-PCS | Performed by: ORTHOPAEDIC SURGERY

## 2025-01-31 PROCEDURE — 36415 COLL VENOUS BLD VENIPUNCTURE: CPT

## 2025-01-31 PROCEDURE — 2720000010 HC SURG SUPPLY STERILE: Performed by: ORTHOPAEDIC SURGERY

## 2025-01-31 PROCEDURE — 2709999900 HC NON-CHARGEABLE SUPPLY: Performed by: ORTHOPAEDIC SURGERY

## 2025-01-31 PROCEDURE — 6360000002 HC RX W HCPCS: Performed by: ORTHOPAEDIC SURGERY

## 2025-01-31 PROCEDURE — 2500000003 HC RX 250 WO HCPCS: Performed by: STUDENT IN AN ORGANIZED HEALTH CARE EDUCATION/TRAINING PROGRAM

## 2025-01-31 PROCEDURE — 3600000015 HC SURGERY LEVEL 5 ADDTL 15MIN: Performed by: ORTHOPAEDIC SURGERY

## 2025-01-31 PROCEDURE — 2500000003 HC RX 250 WO HCPCS: Performed by: ORTHOPAEDIC SURGERY

## 2025-01-31 PROCEDURE — 6360000002 HC RX W HCPCS: Performed by: EMERGENCY MEDICINE

## 2025-01-31 PROCEDURE — 76000 FLUOROSCOPY <1 HR PHYS/QHP: CPT

## 2025-01-31 PROCEDURE — 6360000002 HC RX W HCPCS: Performed by: STUDENT IN AN ORGANIZED HEALTH CARE EDUCATION/TRAINING PROGRAM

## 2025-01-31 PROCEDURE — C1713 ANCHOR/SCREW BN/BN,TIS/BN: HCPCS | Performed by: ORTHOPAEDIC SURGERY

## 2025-01-31 PROCEDURE — 73501 X-RAY EXAM HIP UNI 1 VIEW: CPT

## 2025-01-31 PROCEDURE — C1776 JOINT DEVICE (IMPLANTABLE): HCPCS | Performed by: ORTHOPAEDIC SURGERY

## 2025-01-31 PROCEDURE — 85730 THROMBOPLASTIN TIME PARTIAL: CPT

## 2025-01-31 PROCEDURE — 2580000003 HC RX 258: Performed by: REGISTERED NURSE

## 2025-01-31 PROCEDURE — 6360000002 HC RX W HCPCS: Performed by: ANESTHESIOLOGY

## 2025-01-31 PROCEDURE — 2500000003 HC RX 250 WO HCPCS: Performed by: REGISTERED NURSE

## 2025-01-31 PROCEDURE — 1100000003 HC PRIVATE W/ TELEMETRY

## 2025-01-31 PROCEDURE — 3600000005 HC SURGERY LEVEL 5 BASE: Performed by: ORTHOPAEDIC SURGERY

## 2025-01-31 PROCEDURE — 3700000001 HC ADD 15 MINUTES (ANESTHESIA): Performed by: ORTHOPAEDIC SURGERY

## 2025-01-31 PROCEDURE — 94640 AIRWAY INHALATION TREATMENT: CPT

## 2025-01-31 PROCEDURE — 64447 NJX AA&/STRD FEMORAL NRV IMG: CPT | Performed by: STUDENT IN AN ORGANIZED HEALTH CARE EDUCATION/TRAINING PROGRAM

## 2025-01-31 PROCEDURE — 7100000001 HC PACU RECOVERY - ADDTL 15 MIN: Performed by: ORTHOPAEDIC SURGERY

## 2025-01-31 PROCEDURE — 6360000002 HC RX W HCPCS: Performed by: REGISTERED NURSE

## 2025-01-31 PROCEDURE — 3700000000 HC ANESTHESIA ATTENDED CARE: Performed by: ORTHOPAEDIC SURGERY

## 2025-01-31 PROCEDURE — 7100000000 HC PACU RECOVERY - FIRST 15 MIN: Performed by: ORTHOPAEDIC SURGERY

## 2025-01-31 DEVICE — NECK ADJUSTMENT SLEEVE
Type: IMPLANTABLE DEVICE | Site: HIP | Status: FUNCTIONAL
Brand: UNITRAX

## 2025-01-31 DEVICE — UNIVERSAL DISTAL CEMENT SPACER
Type: IMPLANTABLE DEVICE | Site: HIP | Status: FUNCTIONAL
Brand: OMNIFIT

## 2025-01-31 DEVICE — IMPLANTABLE DEVICE: Type: IMPLANTABLE DEVICE | Site: HIP | Status: FUNCTIONAL

## 2025-01-31 DEVICE — TOBRA FULL DOSE ANTIBIOTIC BONE CEMENT, 10 PACK CATALOG NUMBER IS 6197-9-010
Type: IMPLANTABLE DEVICE | Site: HIP | Status: FUNCTIONAL
Brand: SIMPLEX

## 2025-01-31 DEVICE — IMPL CAPPED H6 HEMI UNI/BIPOLAR STRYKER  PRIMARY STEM: Type: IMPLANTABLE DEVICE | Site: HIP | Status: FUNCTIONAL

## 2025-01-31 DEVICE — 127 DEGREE CEMENTED HIP STEM
Type: IMPLANTABLE DEVICE | Site: HIP | Status: FUNCTIONAL
Brand: ACCOLADE

## 2025-01-31 RX ORDER — NALOXONE HYDROCHLORIDE 0.4 MG/ML
INJECTION, SOLUTION INTRAMUSCULAR; INTRAVENOUS; SUBCUTANEOUS PRN
Status: DISCONTINUED | OUTPATIENT
Start: 2025-01-31 | End: 2025-01-31 | Stop reason: HOSPADM

## 2025-01-31 RX ORDER — ROPIVACAINE HYDROCHLORIDE 5 MG/ML
INJECTION, SOLUTION EPIDURAL; INFILTRATION; PERINEURAL PRN
Status: DISCONTINUED | OUTPATIENT
Start: 2025-01-31 | End: 2025-01-31 | Stop reason: ALTCHOICE

## 2025-01-31 RX ORDER — FENTANYL CITRATE 50 UG/ML
INJECTION, SOLUTION INTRAMUSCULAR; INTRAVENOUS
Status: DISCONTINUED | OUTPATIENT
Start: 2025-01-31 | End: 2025-01-31 | Stop reason: SDUPTHER

## 2025-01-31 RX ORDER — SODIUM CHLORIDE, SODIUM LACTATE, POTASSIUM CHLORIDE, CALCIUM CHLORIDE 600; 310; 30; 20 MG/100ML; MG/100ML; MG/100ML; MG/100ML
INJECTION, SOLUTION INTRAVENOUS
Status: DISCONTINUED | OUTPATIENT
Start: 2025-01-31 | End: 2025-01-31 | Stop reason: SDUPTHER

## 2025-01-31 RX ORDER — SODIUM CHLORIDE 0.9 % (FLUSH) 0.9 %
5-40 SYRINGE (ML) INJECTION EVERY 12 HOURS SCHEDULED
Status: DISCONTINUED | OUTPATIENT
Start: 2025-01-31 | End: 2025-02-03 | Stop reason: HOSPADM

## 2025-01-31 RX ORDER — FENTANYL CITRATE 50 UG/ML
50 INJECTION, SOLUTION INTRAMUSCULAR; INTRAVENOUS EVERY 5 MIN PRN
Status: DISCONTINUED | OUTPATIENT
Start: 2025-01-31 | End: 2025-01-31 | Stop reason: HOSPADM

## 2025-01-31 RX ORDER — SODIUM CHLORIDE 9 MG/ML
INJECTION, SOLUTION INTRAVENOUS PRN
Status: DISCONTINUED | OUTPATIENT
Start: 2025-01-31 | End: 2025-01-31 | Stop reason: HOSPADM

## 2025-01-31 RX ORDER — ONDANSETRON 2 MG/ML
INJECTION INTRAMUSCULAR; INTRAVENOUS
Status: DISCONTINUED | OUTPATIENT
Start: 2025-01-31 | End: 2025-01-31 | Stop reason: SDUPTHER

## 2025-01-31 RX ORDER — SODIUM CHLORIDE 9 MG/ML
INJECTION, SOLUTION INTRAVENOUS PRN
Status: DISCONTINUED | OUTPATIENT
Start: 2025-01-31 | End: 2025-02-03 | Stop reason: HOSPADM

## 2025-01-31 RX ORDER — SODIUM CHLORIDE 0.9 % (FLUSH) 0.9 %
5-40 SYRINGE (ML) INJECTION PRN
Status: DISCONTINUED | OUTPATIENT
Start: 2025-01-31 | End: 2025-01-31 | Stop reason: HOSPADM

## 2025-01-31 RX ORDER — TRANEXAMIC ACID 100 MG/ML
INJECTION, SOLUTION INTRAVENOUS PRN
Status: DISCONTINUED | OUTPATIENT
Start: 2025-01-31 | End: 2025-01-31 | Stop reason: ALTCHOICE

## 2025-01-31 RX ORDER — EPHEDRINE SULFATE/0.9% NACL/PF 50 MG/5 ML
SYRINGE (ML) INTRAVENOUS
Status: DISCONTINUED | OUTPATIENT
Start: 2025-01-31 | End: 2025-01-31 | Stop reason: SDUPTHER

## 2025-01-31 RX ORDER — SUCCINYLCHOLINE CHLORIDE 20 MG/ML
INJECTION INTRAMUSCULAR; INTRAVENOUS
Status: DISCONTINUED | OUTPATIENT
Start: 2025-01-31 | End: 2025-01-31 | Stop reason: SDUPTHER

## 2025-01-31 RX ORDER — HYDROMORPHONE HYDROCHLORIDE 1 MG/ML
0.25 INJECTION, SOLUTION INTRAMUSCULAR; INTRAVENOUS; SUBCUTANEOUS EVERY 5 MIN PRN
Status: DISCONTINUED | OUTPATIENT
Start: 2025-01-31 | End: 2025-01-31 | Stop reason: HOSPADM

## 2025-01-31 RX ORDER — ROPIVACAINE HYDROCHLORIDE 5 MG/ML
INJECTION, SOLUTION EPIDURAL; INFILTRATION; PERINEURAL
Status: COMPLETED | OUTPATIENT
Start: 2025-01-31 | End: 2025-01-31

## 2025-01-31 RX ORDER — EPHEDRINE SULFATE 5 MG/ML
INJECTION INTRAVENOUS
Status: DISCONTINUED | OUTPATIENT
Start: 2025-01-31 | End: 2025-01-31 | Stop reason: SDUPTHER

## 2025-01-31 RX ORDER — PHENYLEPHRINE HCL IN 0.9% NACL 0.4MG/10ML
SYRINGE (ML) INTRAVENOUS
Status: DISCONTINUED | OUTPATIENT
Start: 2025-01-31 | End: 2025-01-31 | Stop reason: SDUPTHER

## 2025-01-31 RX ORDER — CEFAZOLIN SODIUM/WATER 2 G/20 ML
SYRINGE (ML) INTRAVENOUS
Status: DISCONTINUED | OUTPATIENT
Start: 2025-01-31 | End: 2025-01-31 | Stop reason: SDUPTHER

## 2025-01-31 RX ORDER — SODIUM CHLORIDE 0.9 % (FLUSH) 0.9 %
5-40 SYRINGE (ML) INJECTION EVERY 12 HOURS SCHEDULED
Status: DISCONTINUED | OUTPATIENT
Start: 2025-01-31 | End: 2025-01-31 | Stop reason: HOSPADM

## 2025-01-31 RX ORDER — PROCHLORPERAZINE EDISYLATE 5 MG/ML
5 INJECTION INTRAMUSCULAR; INTRAVENOUS
Status: DISCONTINUED | OUTPATIENT
Start: 2025-01-31 | End: 2025-01-31 | Stop reason: HOSPADM

## 2025-01-31 RX ORDER — SODIUM CHLORIDE 0.9 % (FLUSH) 0.9 %
5-40 SYRINGE (ML) INJECTION PRN
Status: DISCONTINUED | OUTPATIENT
Start: 2025-01-31 | End: 2025-02-03 | Stop reason: HOSPADM

## 2025-01-31 RX ORDER — PROPOFOL 10 MG/ML
INJECTION, EMULSION INTRAVENOUS
Status: DISCONTINUED | OUTPATIENT
Start: 2025-01-31 | End: 2025-01-31 | Stop reason: SDUPTHER

## 2025-01-31 RX ORDER — ROCURONIUM BROMIDE 10 MG/ML
INJECTION, SOLUTION INTRAVENOUS
Status: DISCONTINUED | OUTPATIENT
Start: 2025-01-31 | End: 2025-01-31 | Stop reason: SDUPTHER

## 2025-01-31 RX ORDER — DEXAMETHASONE SODIUM PHOSPHATE 4 MG/ML
INJECTION, SOLUTION INTRA-ARTICULAR; INTRALESIONAL; INTRAMUSCULAR; INTRAVENOUS; SOFT TISSUE
Status: DISCONTINUED | OUTPATIENT
Start: 2025-01-31 | End: 2025-01-31 | Stop reason: SDUPTHER

## 2025-01-31 RX ADMIN — IPRATROPIUM BROMIDE AND ALBUTEROL SULFATE 1 DOSE: .5; 3 SOLUTION RESPIRATORY (INHALATION) at 11:40

## 2025-01-31 RX ADMIN — Medication 80 MCG: at 14:46

## 2025-01-31 RX ADMIN — HYDROMORPHONE HYDROCHLORIDE 0.25 MG: 1 INJECTION, SOLUTION INTRAMUSCULAR; INTRAVENOUS; SUBCUTANEOUS at 16:26

## 2025-01-31 RX ADMIN — Medication 80 MCG: at 14:50

## 2025-01-31 RX ADMIN — FENTANYL CITRATE 50 MCG: 50 INJECTION, SOLUTION INTRAMUSCULAR; INTRAVENOUS at 14:34

## 2025-01-31 RX ADMIN — EPHEDRINE SULFATE 5 MG: 5 INJECTION INTRAVENOUS at 15:36

## 2025-01-31 RX ADMIN — DIGOXIN 0.12 MG: 125 TABLET ORAL at 20:23

## 2025-01-31 RX ADMIN — Medication 80 MCG: at 14:37

## 2025-01-31 RX ADMIN — PROPOFOL 20 MG: 10 INJECTION, EMULSION INTRAVENOUS at 14:08

## 2025-01-31 RX ADMIN — EPHEDRINE SULFATE 5 MG: 5 INJECTION INTRAVENOUS at 15:39

## 2025-01-31 RX ADMIN — ROCURONIUM BROMIDE 30 MG: 10 INJECTION INTRAVENOUS at 14:43

## 2025-01-31 RX ADMIN — DEXAMETHASONE SODIUM PHOSPHATE 8 MG: 4 INJECTION INTRA-ARTICULAR; INTRALESIONAL; INTRAMUSCULAR; INTRAVENOUS; SOFT TISSUE at 14:58

## 2025-01-31 RX ADMIN — MORPHINE SULFATE 4 MG: 4 INJECTION, SOLUTION INTRAMUSCULAR; INTRAVENOUS at 10:07

## 2025-01-31 RX ADMIN — MORPHINE SULFATE 4 MG: 4 INJECTION, SOLUTION INTRAMUSCULAR; INTRAVENOUS at 05:32

## 2025-01-31 RX ADMIN — IPRATROPIUM BROMIDE AND ALBUTEROL SULFATE 1 DOSE: .5; 3 SOLUTION RESPIRATORY (INHALATION) at 08:13

## 2025-01-31 RX ADMIN — Medication 5 MG: at 14:52

## 2025-01-31 RX ADMIN — PROPOFOL 100 MG: 10 INJECTION, EMULSION INTRAVENOUS at 14:34

## 2025-01-31 RX ADMIN — SODIUM CHLORIDE, POTASSIUM CHLORIDE, SODIUM LACTATE AND CALCIUM CHLORIDE: 600; 310; 30; 20 INJECTION, SOLUTION INTRAVENOUS at 14:23

## 2025-01-31 RX ADMIN — FENTANYL CITRATE 50 MCG: 50 INJECTION, SOLUTION INTRAMUSCULAR; INTRAVENOUS at 15:08

## 2025-01-31 RX ADMIN — INSULIN LISPRO 2 UNITS: 100 INJECTION, SOLUTION INTRAVENOUS; SUBCUTANEOUS at 20:23

## 2025-01-31 RX ADMIN — Medication 80 MCG: at 15:39

## 2025-01-31 RX ADMIN — SUGAMMADEX 120 MG: 100 INJECTION, SOLUTION INTRAVENOUS at 15:48

## 2025-01-31 RX ADMIN — HYDROMORPHONE HYDROCHLORIDE 0.2 MG: 1 INJECTION, SOLUTION INTRAMUSCULAR; INTRAVENOUS; SUBCUTANEOUS at 15:24

## 2025-01-31 RX ADMIN — SUCCINYLCHOLINE CHLORIDE 120 MG: 20 INJECTION, SOLUTION INTRAMUSCULAR; INTRAVENOUS at 14:35

## 2025-01-31 RX ADMIN — HEPARIN SODIUM 2000 UNITS: 1000 INJECTION INTRAVENOUS; SUBCUTANEOUS at 00:17

## 2025-01-31 RX ADMIN — ROPIVACAINE HYDROCHLORIDE 20 ML: 5 INJECTION, SOLUTION EPIDURAL; INFILTRATION; PERINEURAL at 14:10

## 2025-01-31 RX ADMIN — SODIUM CHLORIDE, PRESERVATIVE FREE 10 ML: 5 INJECTION INTRAVENOUS at 20:24

## 2025-01-31 RX ADMIN — Medication 120 MCG: at 14:52

## 2025-01-31 RX ADMIN — ONDANSETRON 4 MG: 2 INJECTION INTRAMUSCULAR; INTRAVENOUS at 14:58

## 2025-01-31 RX ADMIN — HEPARIN SODIUM 2000 UNITS: 1000 INJECTION INTRAVENOUS; SUBCUTANEOUS at 00:09

## 2025-01-31 RX ADMIN — INSULIN LISPRO 1 UNITS: 100 INJECTION, SOLUTION INTRAVENOUS; SUBCUTANEOUS at 11:10

## 2025-01-31 RX ADMIN — IPRATROPIUM BROMIDE AND ALBUTEROL SULFATE 1 DOSE: .5; 3 SOLUTION RESPIRATORY (INHALATION) at 22:32

## 2025-01-31 RX ADMIN — WATER 2000 MG: 1 INJECTION INTRAMUSCULAR; INTRAVENOUS; SUBCUTANEOUS at 22:20

## 2025-01-31 RX ADMIN — Medication 2 G: at 14:45

## 2025-01-31 RX ADMIN — PHENYLEPHRINE HYDROCHLORIDE 20 MCG/MIN: 10 INJECTION INTRAVENOUS at 14:44

## 2025-01-31 ASSESSMENT — PAIN DESCRIPTION - LOCATION
LOCATION: HIP
LOCATION: HIP;LEG

## 2025-01-31 ASSESSMENT — PAIN DESCRIPTION - ORIENTATION
ORIENTATION: LEFT

## 2025-01-31 ASSESSMENT — PAIN SCALES - GENERAL
PAINLEVEL_OUTOF10: 0
PAINLEVEL_OUTOF10: 0
PAINLEVEL_OUTOF10: 5
PAINLEVEL_OUTOF10: 3
PAINLEVEL_OUTOF10: 3
PAINLEVEL_OUTOF10: 9
PAINLEVEL_OUTOF10: 2
PAINLEVEL_OUTOF10: 0

## 2025-01-31 ASSESSMENT — PAIN - FUNCTIONAL ASSESSMENT
PAIN_FUNCTIONAL_ASSESSMENT: PREVENTS OR INTERFERES SOME ACTIVE ACTIVITIES AND ADLS
PAIN_FUNCTIONAL_ASSESSMENT: PREVENTS OR INTERFERES SOME ACTIVE ACTIVITIES AND ADLS
PAIN_FUNCTIONAL_ASSESSMENT: PREVENTS OR INTERFERES WITH ALL ACTIVE AND SOME PASSIVE ACTIVITIES
PAIN_FUNCTIONAL_ASSESSMENT: PREVENTS OR INTERFERES SOME ACTIVE ACTIVITIES AND ADLS

## 2025-01-31 ASSESSMENT — PAIN DESCRIPTION - PAIN TYPE
TYPE: ACUTE PAIN

## 2025-01-31 ASSESSMENT — PAIN DESCRIPTION - DESCRIPTORS
DESCRIPTORS: ACHING
DESCRIPTORS: ACHING
DESCRIPTORS: ACHING;THROBBING
DESCRIPTORS: ACHING

## 2025-01-31 ASSESSMENT — PAIN DESCRIPTION - FREQUENCY
FREQUENCY: INTERMITTENT

## 2025-01-31 ASSESSMENT — PAIN DESCRIPTION - ONSET
ONSET: GRADUAL
ONSET: GRADUAL
ONSET: SUDDEN

## 2025-01-31 NOTE — PROGRESS NOTES
PT note:     PT orders received and acknowledged. Chart reviewed and noted patient is scheduled for L hip hemiarthroplasty this date. Will hold PT evaluation and follow up tomorrow once medically appropriate and WB status is established.     Kala Carrasco, PT, DPT

## 2025-01-31 NOTE — ANESTHESIA PROCEDURE NOTES
Peripheral Block    Patient location during procedure: pre-op  Reason for block: post-op pain management and at surgeon's request  Start time: 1/31/2025 2:08 PM  End time: 1/31/2025 2:15 PM  Staffing  Performed: anesthesiologist   Anesthesiologist: Barry Rosario MD  Performed by: Barry Rosario MD  Authorized by: Barry Rosario MD    Preanesthetic Checklist  Completed: patient identified, IV checked, site marked, risks and benefits discussed, surgical/procedural consents, equipment checked, pre-op evaluation, timeout performed, anesthesia consent given, oxygen available, monitors applied/VS acknowledged and fire risk safety assessment completed and verbalized  Peripheral Block   Patient position: supine  Prep: ChloraPrep  Provider prep: mask and sterile gloves  Patient monitoring: cardiac monitor, continuous pulse ox, frequent blood pressure checks, IV access and oxygen  Block type: PENG  Laterality: left  Injection technique: single-shot  Guidance: ultrasound guided  Local infiltration: lidocaine  Local infiltration: lidocaine    Needle   Needle type: insulated echogenic nerve stimulator needle   Needle gauge: 21 G  Needle localization: anatomical landmarks and ultrasound guidance  Needle length: 10 cm  Assessment   Injection assessment: negative aspiration for heme, no paresthesia on injection, local visualized surrounding nerve on ultrasound and no intravascular symptoms  Paresthesia pain: none  Slow fractionated injection: yes  Hemodynamics: stable  Outcomes: uncomplicated and patient tolerated procedure well    Medications Administered  ropivacaine (NAROPIN) injection 0.5% - Perineural   20 mL - 1/31/2025 2:10:00 PM

## 2025-01-31 NOTE — CONSULTS
Orthopedic Consult Note:    HPI:  Kamilla Lantigua is an 89 y.o. female who presents with left hip pain after ground level fall. Immediate pain and inability to bear weight. Brought to ER by EMS and imaging revealed left femoral neck fracture. Orthopedics was consulted for surgical management of this patient. Patient Moderate dementia at baseline. Household ambulator at baseline with assistive devices.    Physical Exam:  A&O x 1  Focused exam of left lower extremity:  Shorted, ER  Pain in log roll  TTP  SILT  NVI distally    Imaging:  I have independently reviewed and interpreted x-rays of left hip which demonstrate displaced femoral neck fracture.    A/P:  89 y.o. female with left femoral neck fracture    Diagnosis and imaging were reviewed. Risks of surgery were reviewed including pain, infection, bleeding, blood clots, nonunion, malunion, fracture, neurovascular injury, need for re-operation, and anesthesia complications including death. These were discussed with the patient in a shared decision-making process in understandable terms where the relative pros and cons were considered. After an informed discussion of risks, benefits, and alternatives, and understanding the risks involved, the patient decided to proceed with surgery.     -Medical management and optimization per IMED  -NPO   -plan for OR today pending medical clearance

## 2025-01-31 NOTE — PLAN OF CARE
Problem: Chronic Conditions and Co-morbidities  Goal: Patient's chronic conditions and co-morbidity symptoms are monitored and maintained or improved  1/31/2025 0905 by Chaya Melendez LPN  Outcome: Progressing  Flowsheets (Taken 1/31/2025 0729)  Care Plan - Patient's Chronic Conditions and Co-Morbidity Symptoms are Monitored and Maintained or Improved:   Monitor and assess patient's chronic conditions and comorbid symptoms for stability, deterioration, or improvement   Collaborate with multidisciplinary team to address chronic and comorbid conditions and prevent exacerbation or deterioration   Update acute care plan with appropriate goals if chronic or comorbid symptoms are exacerbated and prevent overall improvement and discharge  1/31/2025 0051 by Shaina Mayer RN  Outcome: Progressing     Problem: Safety - Adult  Goal: Free from fall injury  1/31/2025 0905 by Chaya Melendez LPN  Outcome: Progressing  Flowsheets (Taken 1/31/2025 0729)  Free From Fall Injury: Instruct family/caregiver on patient safety  1/31/2025 0051 by Shaina Mayer RN  Outcome: Progressing     Problem: Pain  Goal: Verbalizes/displays adequate comfort level or baseline comfort level  Outcome: Progressing     Problem: Respiratory - Adult  Goal: Achieves optimal ventilation and oxygenation  1/31/2025 0905 by Chaya Melendez LPN  Outcome: Progressing  1/31/2025 0813 by Jeanie Grove RCP  Outcome: Progressing  Flowsheets (Taken 1/31/2025 0729 by Chaya Melendez LPN)  Achieves optimal ventilation and oxygenation:   Assess for changes in respiratory status   Assess for changes in mentation and behavior   Position to facilitate oxygenation and minimize respiratory effort   Oxygen supplementation based on oxygen saturation or arterial blood gases   Encourage broncho-pulmonary hygiene including cough, deep breathe, incentive spirometry   Assess the need for suctioning and aspirate as needed   Assess and instruct to report shortness of

## 2025-01-31 NOTE — ANESTHESIA POST-OP
TRANSFER - OUT REPORT:    Verbal report given to Chaya Melendez RN (name) on Kamilla Lantigua  being transferred to [facility](unit) for routine post-op       Report consisted of patient’s Situation, Background, Assessment and   Recommendations(SBAR).     Information from the following report(s) Surgery Report, Intake/Output, MAR, Recent Results, and Cardiac Rhythm SR  was reviewed with the receiving nurse.    Opportunity for questions and clarification was provided.      Patient transported with:   Monitor  O2 @ 3 liters  Registered Nurse  Tech

## 2025-01-31 NOTE — PERIOP NOTE
1115  TRANSFER - IN REPORT:    Verbal report received from SOFIA RAMOS on Kamilla Lantigua  being received from 120 for ordered procedure      Report consisted of patient's Situation, Background, Assessment and   Recommendations(SBAR).     Information from the following report(s) Pre Procedure Checklist and Procedure Verification was reviewed with the receiving nurse.    Opportunity for questions and clarification was provided.      Assessment completed upon patient's arrival to unit and care assumed.

## 2025-01-31 NOTE — PROGRESS NOTES
Hospitalist Progress Note    NAME:   Kamilla Lantigua   : 1935   MRN: [id number]     Date/Time: 2025 1:47 PM  Patient PCP: No primary care provider on file.    Estimated discharge date:  Barriers:       Assessment / Plan:  Left displaced femoral neck fracture   S/p mechanical ground level  fall     Plan :  Continue  tele monitoring   Evaluated by Ortho: Scheduled  for left hip hemiarthroplasty on   S/p  heparin drip- resume anticoagulation once okay with ortho team   Pain management  PT/OT eval after the procedure     Pre-op cardiac risk assessment:    UA unremarkable  EKG- sinus rhythm, non specific st- t wave changes   CXR - Some residual nodular airspace disease is present at the left lung base;follow-up to complete resolution is recommended. No evidence of new pneumonia or  pleural effusion.     Pt evaluated using revised cardiac risk index and is felt to be moderate cardiovascular risk for intermediate with a 10.2% risk for major complications based on these criteria.    Patient has h/o  COPD , not in exacerbation at present. Had required BiPAP in past. CO2 better -33 compare to prior . AAOX3  with no acute chest pain or SOB. May need close monitoring post anesthesia .  Has h/o Paroxysmal Afib- at present at sinus rate controlled . No acute signs of Heart failure .   She had prior hip surgery performed with no complications after anesthesia.   She has multiple co morbidities increasing risk of surgery . However benefits outweighs risk . Plan for surgery without further cardiac testing if this risk is acceptable per surgery and anesthesia.     Further risk reduction will involve medical management of other comorbid conditions in the perioperative period.         Chronic  PE on Eliquis  Paroxysmal Afib   Prior history of right hip fracture 1 year ago s/p hip hemiarthroplasty  Diabetes mellitus  COPD- on 2-3 l NC at baseline   Chronic pain   - Hold Eliquis  last dose on    - Resume  anticoagulation once appropriate with ortho after the surgery   - Continue with scheduled nebulization  -Incentive spirometer post surgery   - Hold oral DM medications   - Started on correction insulin   -Blood glucose in a.m. slightly higher but in acceptable range  -Hypoglycemia treatment as per protocol                Medical Decision Making:   I personally reviewed labs: Yes  I personally reviewed imaging:  I personally reviewed EKG:  Toxic drug monitoring: Insulin-blood glucose for hypoglycemia  Discussed case with: Patient, RN,         Code Status: DNR  DVT Prophylaxis:   GI Prophylaxis:    Subjective:     Chief Complaint / Reason for Physician Visit  \" States doing better.  She is waiting for the procedure\".  Discussed with RN events overnight.       Objective:     VITALS:   Last 24hrs VS reviewed since prior progress note. Most recent are:  Patient Vitals for the past 24 hrs:   BP Temp Temp src Pulse Resp SpO2 Height Weight   01/31/25 1319 (!) 173/65 98.6 °F (37 °C) Oral 92 20 91 % -- --   01/31/25 1141 -- -- -- -- -- 94 % -- --   01/31/25 1056 (!) 158/60 97.8 °F (36.6 °C) Oral 85 16 92 % -- --   01/31/25 1037 -- -- -- -- 16 -- -- --   01/31/25 1021 -- -- -- -- -- -- 1.626 m (5' 4\") --   01/31/25 1007 -- -- -- -- 16 -- -- --   01/31/25 0729 (!) 157/58 97.7 °F (36.5 °C) Oral 79 16 95 % -- --   01/31/25 0602 -- -- -- -- 18 -- -- --   01/31/25 0532 -- -- -- -- 18 -- -- --   01/31/25 0405 (!) 145/56 97.8 °F (36.6 °C) Oral 73 16 94 % -- --   01/31/25 0000 -- -- Oral -- -- -- -- --   01/30/25 2246 -- -- -- -- 16 -- -- --   01/30/25 2143 (!) 132/52 97.2 °F (36.2 °C) Oral 70 16 94 % -- --   01/30/25 2021 (!) 192/73 97.7 °F (36.5 °C) Oral 74 16 94 % -- --   01/30/25 1800 (!) 176/60 98 °F (36.7 °C) Oral 68 16 96 % -- --   01/30/25 1700 -- -- -- -- -- -- -- 59.1 kg (130 lb 4.3 oz)   01/30/25 1600 -- -- -- -- -- 95 % -- --   01/30/25 1545 (!) 144/61 -- -- -- -- 93 % -- --   01/30/25 1530 (!) 141/58 -- -- -- -- 91

## 2025-01-31 NOTE — OP NOTE
OPERATIVE REPORT    FACILITY: [facility]    PATIENT NAME: Kamilla Lantigua     DATE OF OPERATION: 1/31/25    PREOPERATIVE DIAGNOSIS: Left femoral neck fracture    POSTOPERATIVE DIAGNOSIS: same    OPERATIVE PROCEDURE:   1. Open treatment of left femoral neck fracture with hip hemiarthroplasty, CPT 44375  2. Fluoroscopy, directed by and interpreted by surgeon - CPT 42849    ATTENDING PHYSICIAN: Frederick Mayen MD    ASSISTANT: Jj Raya PA-C    IMPLANTS:  Endy Accolade C size 4, 127 degree stem, 43 +0 unipolar ball    SPECIMENS:  none    OPERATIVE FINDINGS: Displaced femoral neck fracture, clinical osteopenia    ANESTHESIA: General    FLUIDS:  Please see anesthesia record    ESTIMATED BLOOD LOSS: 150 cc    INDICATIONS FOR PROCEDURE:   This patient is a 89 y.o. female who presented to our institution after a fall. I outlined risks involved with the operation, including, but not limited to bleeding, infection, damage to neurovascular structures, limb length discrepancy, persistent limp, need for blood transfusion, fracture, hardware failure, dislocation, and medical risks including blood clots, stroke, heart attack, and even death. After a detailed description of the risks, benefits, and alternatives, the patient elected to proceed with hemiarthroplasty for femoral neck fracture.     DETAILED DESCRIPTION OF PROCEDURE:   After anesthesia was induced, the patient was placed on the operative table. The patient's was prepped and draped in the usual fashion. Appropriate timeouts were performed verifying the correct patient, side, and operation. Preoperative antibiotics were administered.     An incision was made over the affected hip two fingerbreadths distal and lateral to the ASIS extending in line towards the ipsilateral fibular head. Dissection was taken through skin and subcutaneous tissue. Hemostasis was obtained. The fascia overlying the tensor fascia irineo was incised and developed medially over to the medial  border of the TFL muscle belly by blunt dissection. The superior femoral neck was palpated and two cobra retractors were placed superior and inferior to the femoral neck. The ascending branches of the lateral femoral circumflex arteries were identified and coagulated. The capsule was identified and cleared of pericapsular fat. The capsulotomy was made and tagged with sutures. The femoral neck fracture was identified, and the femoral neck cut was freshened. The femoral head was removed with a corkscrew. The labrum was preserved.     Attention was then turned to the femur. The superior capsular flap was used to peel the superior capsule from the femoral neck and sequential releases were completed posteriorly and medially along the inside of the greater trochanter with care taken to avoid release of the abductors and obturator externus. After releases allowed for adequate exposure of the proximal femur, retractors were placed on the medial and superolateral portions of the proximal femur. The canal was opened with a box osteotome and it was then sounded with a canal finder. Sequential broaching was completed up to size 4 stem. The calcar reamer was used to smooth rough bone edges down to the level of the broach. A trial neck and ball were placed and the hip was reduced. I felt that the combination of a high offset neck and 0 ball gave the best combination of offset, stability, leg lengths and restoration of hip mechanics. The trial components were removed. The femoral canal was washed and cleaned. A restrictor was placed for the cement. The cement was mixed on the back table, and placed within the canal. It was pressurized and the final cemented stem was placed and held firm until the cement was cured. The final head was impacted in line with the neck after cleaning and drying the trunion. The components were reduced and final fluoroscopy was completed to confirm appropriate component position without apparent

## 2025-01-31 NOTE — FLOWSHEET NOTE
01/31/25 1605   Handoff   Communication Given Periop Handoff/Relief   Handoff phase Phase I receiving   Handoff Given To Ihsan Oconnell RN   Handoff Received From Jacqueline Bray RN/Juanita Johnson CRNA   Handoff Communication Face to Face;At bedside   Time Handoff Given 1607

## 2025-01-31 NOTE — PROGRESS NOTES
End of Shift Note    Bedside shift change report given to Monica RAMOS (oncoming nurse) by Shaina Mayer RN (offgoing nurse).  Report included the following information SBAR, Kardex, and MAR    Shift worked:  night     Shift summary and any significant changes:     Patient NPO since midnight ,pain managed with morphine ,CHG bath done      Concerns for physician to address:  none     Zone phone for oncoming shift:          Activity:  Level of Assistance: Maximum assist, patient does 25-49%  Number times ambulated in hallways past shift: 0  Number of times OOB to chair past shift: 0    Cardiac:   Cardiac Monitoring: Yes           Access:  Current line(s): PIV     Genitourinary:   Urinary Status: Voiding, External catheter    Respiratory:   O2 Device: None (Room air)  Chronic home O2 use?: NO  Incentive spirometer at bedside: YES    GI:     Current diet:  Diet NPO  Passing flatus: YES    Pain Management:   Patient states pain is manageable on current regimen: YES    Skin:  August Scale Score: 19  Interventions: Wound Offloading (Prevention Methods): Bed, pressure reduction mattress, Repositioning, Pillows, Turning    Patient Safety:  Fall Risk: Nursing Judgement-Fall Risk High(Add Comments): Yes  Fall Risk Interventions  Nursing Judgement-Fall Risk High(Add Comments): Yes  Toilet Every 2 Hours-In Advance of Need: Yes  Hourly Visual Checks: In bed, Eyes closed  Fall Visual Posted: Armband, Socks, Fall sign posted  Room Door Open: Deferred to promote rest  Alarm On: Bed    Active Consults:   IP CONSULT TO ORTHOPEDIC SURGERY    Length of Stay:  Expected LOS: 3  Actual LOS: 1    Shaina Mayer RN

## 2025-01-31 NOTE — PROGRESS NOTES
Occupational Therapy     Orders acknowledged and chart reviewed in prep for skilled OT evaluation; however, pt pending L hip hemiarthroplasty this date.  OT to hold and follow up post surgery as able and appropriate.    Thank you,  Matthew Kerley, OT

## 2025-02-01 LAB
APTT PPP: 29.3 SEC (ref 22.1–31)
ERYTHROCYTE [DISTWIDTH] IN BLOOD BY AUTOMATED COUNT: 14.1 % (ref 11.5–14.5)
GLUCOSE BLD STRIP.AUTO-MCNC: 179 MG/DL (ref 65–117)
GLUCOSE BLD STRIP.AUTO-MCNC: 232 MG/DL (ref 65–117)
GLUCOSE BLD STRIP.AUTO-MCNC: 235 MG/DL (ref 65–117)
GLUCOSE BLD STRIP.AUTO-MCNC: 240 MG/DL (ref 65–117)
HCT VFR BLD AUTO: 31 % (ref 35–47)
HGB BLD-MCNC: 10.4 G/DL (ref 11.5–16)
MCH RBC QN AUTO: 28.7 PG (ref 26–34)
MCHC RBC AUTO-ENTMCNC: 33.5 G/DL (ref 30–36.5)
MCV RBC AUTO: 85.4 FL (ref 80–99)
NRBC # BLD: 0 K/UL (ref 0–0.01)
NRBC BLD-RTO: 0 PER 100 WBC
PLATELET # BLD AUTO: 209 K/UL (ref 150–400)
PMV BLD AUTO: 9.3 FL (ref 8.9–12.9)
RBC # BLD AUTO: 3.63 M/UL (ref 3.8–5.2)
SERVICE CMNT-IMP: ABNORMAL
THERAPEUTIC RANGE: NORMAL SECS (ref 58–77)
WBC # BLD AUTO: 11.5 K/UL (ref 3.6–11)

## 2025-02-01 PROCEDURE — 97535 SELF CARE MNGMENT TRAINING: CPT

## 2025-02-01 PROCEDURE — 97161 PT EVAL LOW COMPLEX 20 MIN: CPT

## 2025-02-01 PROCEDURE — 97165 OT EVAL LOW COMPLEX 30 MIN: CPT

## 2025-02-01 PROCEDURE — 97530 THERAPEUTIC ACTIVITIES: CPT

## 2025-02-01 PROCEDURE — 85027 COMPLETE CBC AUTOMATED: CPT

## 2025-02-01 PROCEDURE — 2500000003 HC RX 250 WO HCPCS: Performed by: ORTHOPAEDIC SURGERY

## 2025-02-01 PROCEDURE — 2500000003 HC RX 250 WO HCPCS: Performed by: STUDENT IN AN ORGANIZED HEALTH CARE EDUCATION/TRAINING PROGRAM

## 2025-02-01 PROCEDURE — 99024 POSTOP FOLLOW-UP VISIT: CPT | Performed by: PHYSICIAN ASSISTANT

## 2025-02-01 PROCEDURE — 6360000002 HC RX W HCPCS: Performed by: ORTHOPAEDIC SURGERY

## 2025-02-01 PROCEDURE — 6370000000 HC RX 637 (ALT 250 FOR IP): Performed by: STUDENT IN AN ORGANIZED HEALTH CARE EDUCATION/TRAINING PROGRAM

## 2025-02-01 PROCEDURE — 94640 AIRWAY INHALATION TREATMENT: CPT

## 2025-02-01 PROCEDURE — 1100000000 HC RM PRIVATE

## 2025-02-01 PROCEDURE — 97116 GAIT TRAINING THERAPY: CPT

## 2025-02-01 PROCEDURE — 2500000003 HC RX 250 WO HCPCS: Performed by: ANESTHESIOLOGY

## 2025-02-01 PROCEDURE — 36415 COLL VENOUS BLD VENIPUNCTURE: CPT

## 2025-02-01 PROCEDURE — 6370000000 HC RX 637 (ALT 250 FOR IP): Performed by: INTERNAL MEDICINE

## 2025-02-01 PROCEDURE — 82962 GLUCOSE BLOOD TEST: CPT

## 2025-02-01 PROCEDURE — 85730 THROMBOPLASTIN TIME PARTIAL: CPT

## 2025-02-01 RX ORDER — HYDROCODONE BITARTRATE AND ACETAMINOPHEN 7.5; 325 MG/1; MG/1
1 TABLET ORAL EVERY 6 HOURS PRN
Status: DISCONTINUED | OUTPATIENT
Start: 2025-02-01 | End: 2025-02-03 | Stop reason: HOSPADM

## 2025-02-01 RX ORDER — IPRATROPIUM BROMIDE AND ALBUTEROL SULFATE 2.5; .5 MG/3ML; MG/3ML
1 SOLUTION RESPIRATORY (INHALATION) EVERY 4 HOURS PRN
Status: DISCONTINUED | OUTPATIENT
Start: 2025-02-01 | End: 2025-02-03 | Stop reason: HOSPADM

## 2025-02-01 RX ADMIN — WATER 2000 MG: 1 INJECTION INTRAMUSCULAR; INTRAVENOUS; SUBCUTANEOUS at 08:56

## 2025-02-01 RX ADMIN — INSULIN LISPRO 1 UNITS: 100 INJECTION, SOLUTION INTRAVENOUS; SUBCUTANEOUS at 08:55

## 2025-02-01 RX ADMIN — SODIUM CHLORIDE, PRESERVATIVE FREE 10 ML: 5 INJECTION INTRAVENOUS at 20:39

## 2025-02-01 RX ADMIN — SODIUM CHLORIDE, PRESERVATIVE FREE 10 ML: 5 INJECTION INTRAVENOUS at 20:30

## 2025-02-01 RX ADMIN — IPRATROPIUM BROMIDE AND ALBUTEROL SULFATE 1 DOSE: .5; 3 SOLUTION RESPIRATORY (INHALATION) at 08:01

## 2025-02-01 RX ADMIN — ACETAMINOPHEN 650 MG: 325 TABLET ORAL at 08:55

## 2025-02-01 RX ADMIN — APIXABAN 5 MG: 5 TABLET, FILM COATED ORAL at 20:30

## 2025-02-01 RX ADMIN — ACETAMINOPHEN 650 MG: 325 TABLET ORAL at 20:30

## 2025-02-01 RX ADMIN — INSULIN LISPRO 1 UNITS: 100 INJECTION, SOLUTION INTRAVENOUS; SUBCUTANEOUS at 16:29

## 2025-02-01 RX ADMIN — INSULIN LISPRO 1 UNITS: 100 INJECTION, SOLUTION INTRAVENOUS; SUBCUTANEOUS at 20:30

## 2025-02-01 RX ADMIN — DIGOXIN 0.12 MG: 125 TABLET ORAL at 20:30

## 2025-02-01 NOTE — PROGRESS NOTES
ORTHO - Progress Note  Post Op day: 1 Day Post-Op    Kamilla Lantigua     [id number]  female    89 y.o.    1935    Admit date:2025  Date of Surgery:  Procedures:Procedure(s):  LEFT HIP HEMIARTHROPLASTY ANTERIOR  Surgeon:Surgeons and Role:   * Frederick Mayen MD - Primary        SUBJECTIVE:     Kamilla Lantigua is a 89 y.o. female resting in the chair.  Patient has complaints of appropriate post-op pain, tolerating PO pain medications APAP    Several family members at bedside    Denies F/C, nausea, vomiting, dizziness, lightheadedness, chest pain, or shortness of breath.    OBJECTIVE:       Physical Exam:  General: alert, cooperative, no distress.   Gastrointestinal:  non-distended .    Cardiovascular: equal pulses in the lower extremities,  Brisk cap refill in all distal extremities   Genitourinary: Voiding independently   Respiratory: No respiratory distress   Neurological:Neurovascular exam within normal limits.     Senstion intact: LE bilat.    Motor: + DF/PF/EHL.   Musculoskeletal: Hayes's sign negative in bilateral lower extremities.  Calves soft, supple, non-tender upon palpation or with passive stretch.    Dressing/Wound:  Clean, dry and intact. No significant erythema or swelling.    Vital Signs:       Patient Vitals for the past 8 hrs:   BP Temp Temp src Pulse Resp SpO2   25 1107 119/82 98.2 °F (36.8 °C) Oral (!) 101 18 --   25 1000 (!) 121/48 -- -- 82 -- --   25 0801 -- -- -- -- -- 97 %   25 0736 (!) 137/53 97.9 °F (36.6 °C) -- 73 18 96 %                                          Temp (24hrs), Av.1 °F (36.7 °C), Min:97.3 °F (36.3 °C), Max:98.6 °F (37 °C)    Date 25 0000 - 25 2359   Shift 3356-6492 0337-0638 5818-1310 24 Hour Total   INTAKE   Shift Total(mL/kg)       OUTPUT   Urine(mL/kg/hr) 800(1.7)   800   Shift Total(mL/kg) 800(13.5)   800(13.5)   Weight (kg) 59.1 59.1 59.1 59.1     Labs:        Recent Labs     25  1714 25  0534   HCT 38.7

## 2025-02-01 NOTE — PROGRESS NOTES
Paged on call about pt refusing her morning dose of ancef. Pt is a/o x 1 and was not allowing me to administer it. Np sandra aware and notified me to let AM team know. Care continued

## 2025-02-01 NOTE — PROGRESS NOTES
Secure message sent to MD Stella about restarting pt's heparin gtt. MD states to hold for today and possibly restart tomorrow but will defer to Ortho. [place], Ortho PA messaged about preferred DVT prophylaxis. Per PA, Ortho will defer to medical and will discuss in AM.

## 2025-02-01 NOTE — PROGRESS NOTES
Hospitalist Progress Note    NAME:   Kamilla Lantigua   : 1935   MRN: [id number]     Date/Time: 2025 12:27 PM  Patient PCP: No primary care provider on file.    Estimated discharge date:  Barriers:       Assessment / Plan:    Left displaced femoral neck fracture   S/p mechanical ground level  fall  -S/P left hip hemiarthroplasty   -restarting eliquis  -miralax prn  -PT OT eval and treat.  Rec IPR     Chronic  PE on Eliquis  Paroxysmal Afib   Prior history of right hip fracture 1 year ago s/p hip hemiarthroplasty  Diabetes mellitus  COPD- on 2-3 l NC at baseline   Chronic pain   - restarting eliquis   - Continue with scheduled nebulization  - Incentive spirometer post surgery   - continue with SSI prn.  Continue to hold glipizide.  She says she no longer takes metformin  -Hypoglycemia treatment as per protocol         Medical Decision Making:   I personally reviewed labs: Yes  I personally reviewed imaging:  I personally reviewed EKG:  Toxic drug monitoring: Insulin-blood glucose for hypoglycemia  Discussed case with: Patient, RN,         Code Status: DNR  DVT Prophylaxis:   GI Prophylaxis:    Subjective:     Chief Complaint / Reason for Physician Visit  Follow up hip fracture, DM, COPD, PAF      Objective:     VITALS:   Last 24hrs VS reviewed since prior progress note. Most recent are:  Patient Vitals for the past 24 hrs:   BP Temp Temp src Pulse Resp SpO2   25 1107 119/82 98.2 °F (36.8 °C) Oral (!) 101 18 --   25 1000 (!) 121/48 -- -- 82 -- --   25 0801 -- -- -- -- -- 97 %   25 0736 (!) 137/53 97.9 °F (36.6 °C) -- 73 18 96 %   25 0403 125/71 98.2 °F (36.8 °C) -- 85 -- 98 %   25 0022 (!) 137/54 98.6 °F (37 °C) Axillary 73 19 100 %   255 -- -- -- 69 20 97 %   25 -- -- -- 66 20 94 %   25 130/60 97.3 °F (36.3 °C) Oral 80 18 --   25 1856 (!) 130/57 98.1 °F (36.7 °C) -- 80 16 94 %   25 1800 (!) 152/47 97.9 °F

## 2025-02-01 NOTE — PLAN OF CARE
Problem: Physical Therapy - Adult  Goal: By Discharge: Performs mobility at highest level of function for planned discharge setting.  See evaluation for individualized goals.  Description: FUNCTIONAL STATUS PRIOR TO ADMISSION: Patient was independent and active without use of DME.    HOME SUPPORT PRIOR TO ADMISSION: The patient lived with  but did not require assistance.    Physical Therapy Goals  Initiated 2/1/2025  1.  Patient will move from supine to sit and sit to supine, scoot up and down, and roll side to side in bed with modified independence within 7 day(s).    2.  Patient will perform sit to stand with modified independence within 7 day(s).  3.  Patient will transfer from bed to chair and chair to bed with modified independence using the least restrictive device within 7 day(s).  4.  Patient will ambulate with modified independence for 150 feet with the least restrictive device within 7 day(s).   Outcome: Progressing     PHYSICAL THERAPY EVALUATION    Patient: Kamilla Lantigua (89 y.o. female)  Date: 2/1/2025  Primary Diagnosis: Closed fracture of neck of left femur, initial encounter ([facility]) [S72.002A]  Closed left hip fracture, initial encounter ([facility]) [S72.002A]  Procedure(s) (LRB):  LEFT HIP HEMIARTHROPLASTY ANTERIOR (Left) 1 Day Post-Op   Precautions: Restrictions/Precautions: Fall Risk, Bed Alarm, Weight Bearing   Lower Extremity Weight Bearing Restrictions  Left Lower Extremity Weight Bearing: Weight Bearing As Tolerated                  ASSESSMENT :   DEFICITS/IMPAIRMENTS:   The patient is limited by decreased functional mobility, ROM, strength, body mechanics, activity tolerance, endurance, safety awareness, balance, posture.     Patient is an 88 y/o female who was admitted to the hospital s/p fall with L displaced femoral neck fx. Pt is POD #1 from L hemiarthroplasty. Patient educated on the purpose and benefits of skilled PT and goals to be addressed with pt verbalizing good understanding.

## 2025-02-01 NOTE — PLAN OF CARE
Problem: Occupational Therapy - Adult  Goal: By Discharge: Performs self-care activities at highest level of function for planned discharge setting.  See evaluation for individualized goals.  Description: FUNCTIONAL STATUS PRIOR TO ADMISSION:  Patient was ambulatory using no AD, IND-Gail for ADLs. Lives with . [place].    , Prior Level of Assist for ADLs: Independent,  ,  ,  ,  ,  , Prior Level of Assist for Homemaking: Independent,  , Prior Level of Assist for Transfers: Independent, Active : Yes      Occupational Therapy Goals  Initiated 2/1/2025    1. Patient will perform lower body dressing with AE PRN with Contact Guard Assist within 7 day(s).  2. Patient will perform upper body ADLS standing for 5 minutes without fatigue or LOB with Stand by Assist within 7 days.  3. Patient will perform toilet transfers with Supervision within 7 days.  4. Patient will perform all aspects of toileting at Supervision within 7 days.  5. Patient will utilize energy conservation techniques during functional activities without cues within 7 day(s).      Outcome: Progressing   OCCUPATIONAL THERAPY EVALUATION    Patient: Kamilla Lantigua (89 y.o. female)  Date: 2/1/2025  Primary Diagnosis: Closed fracture of neck of left femur, initial encounter ([facility]) [S72.002A]  Closed left hip fracture, initial encounter ([facility]) [S72.002A]  Procedure(s) (LRB):  LEFT HIP HEMIARTHROPLASTY ANTERIOR (Left) 1 Day Post-Op     Precautions: Fall Risk, Bed Alarm, Weight Bearing   Left Lower Extremity Weight Bearing: Weight Bearing As Tolerated              ASSESSMENT :  The patient is limited by decreased functional mobility, independence in ADLs, high-level IADLs, strength, activity tolerance, safety awareness, cognition, balance, [place], following admission for fall and now s/p L hip hemiarthoplasty POD 1. Pt received in bed with family at bedside; pt alert and oriented x3, very [place]; very pleasant and eager to participate. ADLs overall set-up to

## 2025-02-01 NOTE — ANESTHESIA POSTPROCEDURE EVALUATION
Department of Anesthesiology  Postprocedure Note    Patient: Kamilla Lantigua  MRN: [id number]  YOB: 1935  Date of evaluation: 1/31/2025    Procedure Summary       Date: 01/31/25 Room / Location: hospitals MAIN OR  / hospitals MAIN OR    Anesthesia Start: 1429 Anesthesia Stop: 1608    Procedure: LEFT HIP HEMIARTHROPLASTY ANTERIOR (Left: Hip) Diagnosis:       Closed fracture of left hip, initial encounter ([facility])      (Closed fracture of left hip, initial encounter ([facility]) [S72.002A])    Providers: Frederick Mayen MD Responsible Provider: Barry Rosario MD    Anesthesia Type: General ASA Status: 3            Anesthesia Type: General    Miles Phase I: Miles Score: 8    Miles Phase II:      Anesthesia Post Evaluation    Patient location during evaluation: PACU  Patient participation: complete - patient participated  Level of consciousness: sleepy but conscious  Airway patency: patent  Nausea & Vomiting: no nausea and no vomiting  Cardiovascular status: hemodynamically stable  Respiratory status: acceptable and nasal cannula  Hydration status: stable  Multimodal analgesia pain management approach    No notable events documented.

## 2025-02-02 LAB
GLUCOSE BLD STRIP.AUTO-MCNC: 214 MG/DL (ref 65–117)
GLUCOSE BLD STRIP.AUTO-MCNC: 253 MG/DL (ref 65–117)
GLUCOSE BLD STRIP.AUTO-MCNC: 369 MG/DL (ref 65–117)
GLUCOSE BLD STRIP.AUTO-MCNC: 374 MG/DL (ref 65–117)
GLUCOSE BLD STRIP.AUTO-MCNC: 401 MG/DL (ref 65–117)
SERVICE CMNT-IMP: ABNORMAL

## 2025-02-02 PROCEDURE — 99024 POSTOP FOLLOW-UP VISIT: CPT | Performed by: PHYSICIAN ASSISTANT

## 2025-02-02 PROCEDURE — 1100000000 HC RM PRIVATE

## 2025-02-02 PROCEDURE — 2500000003 HC RX 250 WO HCPCS: Performed by: ANESTHESIOLOGY

## 2025-02-02 PROCEDURE — 6370000000 HC RX 637 (ALT 250 FOR IP): Performed by: STUDENT IN AN ORGANIZED HEALTH CARE EDUCATION/TRAINING PROGRAM

## 2025-02-02 PROCEDURE — 6370000000 HC RX 637 (ALT 250 FOR IP): Performed by: INTERNAL MEDICINE

## 2025-02-02 PROCEDURE — 82962 GLUCOSE BLOOD TEST: CPT

## 2025-02-02 PROCEDURE — 51798 US URINE CAPACITY MEASURE: CPT

## 2025-02-02 PROCEDURE — 2500000003 HC RX 250 WO HCPCS: Performed by: STUDENT IN AN ORGANIZED HEALTH CARE EDUCATION/TRAINING PROGRAM

## 2025-02-02 PROCEDURE — 97110 THERAPEUTIC EXERCISES: CPT

## 2025-02-02 PROCEDURE — 97116 GAIT TRAINING THERAPY: CPT

## 2025-02-02 RX ORDER — INSULIN LISPRO 100 [IU]/ML
0-8 INJECTION, SOLUTION INTRAVENOUS; SUBCUTANEOUS
Status: DISCONTINUED | OUTPATIENT
Start: 2025-02-02 | End: 2025-02-03 | Stop reason: HOSPADM

## 2025-02-02 RX ADMIN — SODIUM CHLORIDE, PRESERVATIVE FREE 10 ML: 5 INJECTION INTRAVENOUS at 20:31

## 2025-02-02 RX ADMIN — ACETAMINOPHEN 650 MG: 325 TABLET ORAL at 17:02

## 2025-02-02 RX ADMIN — SODIUM CHLORIDE, PRESERVATIVE FREE 10 ML: 5 INJECTION INTRAVENOUS at 10:14

## 2025-02-02 RX ADMIN — GLIPIZIDE 2.5 MG: 5 TABLET ORAL at 20:24

## 2025-02-02 RX ADMIN — DIGOXIN 0.12 MG: 125 TABLET ORAL at 20:25

## 2025-02-02 RX ADMIN — SODIUM CHLORIDE, PRESERVATIVE FREE 10 ML: 5 INJECTION INTRAVENOUS at 20:30

## 2025-02-02 RX ADMIN — APIXABAN 5 MG: 5 TABLET, FILM COATED ORAL at 20:25

## 2025-02-02 RX ADMIN — INSULIN LISPRO 4 UNITS: 100 INJECTION, SOLUTION INTRAVENOUS; SUBCUTANEOUS at 17:03

## 2025-02-02 RX ADMIN — INSULIN LISPRO 8 UNITS: 100 INJECTION, SOLUTION INTRAVENOUS; SUBCUTANEOUS at 13:10

## 2025-02-02 RX ADMIN — APIXABAN 5 MG: 5 TABLET, FILM COATED ORAL at 10:11

## 2025-02-02 RX ADMIN — SODIUM CHLORIDE, PRESERVATIVE FREE 10 ML: 5 INJECTION INTRAVENOUS at 10:11

## 2025-02-02 RX ADMIN — INSULIN LISPRO 8 UNITS: 100 INJECTION, SOLUTION INTRAVENOUS; SUBCUTANEOUS at 20:30

## 2025-02-02 RX ADMIN — INSULIN LISPRO 1 UNITS: 100 INJECTION, SOLUTION INTRAVENOUS; SUBCUTANEOUS at 10:10

## 2025-02-02 ASSESSMENT — PAIN DESCRIPTION - PAIN TYPE
TYPE: ACUTE PAIN
TYPE: ACUTE PAIN

## 2025-02-02 ASSESSMENT — PAIN - FUNCTIONAL ASSESSMENT
PAIN_FUNCTIONAL_ASSESSMENT: PREVENTS OR INTERFERES SOME ACTIVE ACTIVITIES AND ADLS
PAIN_FUNCTIONAL_ASSESSMENT: PREVENTS OR INTERFERES SOME ACTIVE ACTIVITIES AND ADLS

## 2025-02-02 ASSESSMENT — PAIN DESCRIPTION - ONSET
ONSET: SUDDEN
ONSET: SUDDEN

## 2025-02-02 ASSESSMENT — PAIN SCALES - GENERAL
PAINLEVEL_OUTOF10: 1
PAINLEVEL_OUTOF10: 3

## 2025-02-02 ASSESSMENT — PAIN DESCRIPTION - FREQUENCY
FREQUENCY: INTERMITTENT
FREQUENCY: INTERMITTENT

## 2025-02-02 ASSESSMENT — PAIN DESCRIPTION - LOCATION
LOCATION: HIP
LOCATION: HIP

## 2025-02-02 ASSESSMENT — PAIN DESCRIPTION - ORIENTATION
ORIENTATION: LEFT
ORIENTATION: LEFT

## 2025-02-02 ASSESSMENT — PAIN DESCRIPTION - DESCRIPTORS
DESCRIPTORS: ACHING
DESCRIPTORS: ACHING

## 2025-02-02 NOTE — PLAN OF CARE
Problem: Chronic Conditions and Co-morbidities  Goal: Patient's chronic conditions and co-morbidity symptoms are monitored and maintained or improved  Outcome: Progressing     Problem: Safety - Adult  Goal: Free from fall injury  Outcome: Progressing     Problem: Pain  Goal: Verbalizes/displays adequate comfort level or baseline comfort level  Outcome: Progressing     Problem: Respiratory - Adult  Goal: Achieves optimal ventilation and oxygenation  Outcome: Progressing     Problem: Skin/Tissue Integrity  Goal: Skin integrity remains intact  Description: 1.  Monitor for areas of redness and/or skin breakdown  2.  Assess vascular access sites hourly  3.  Every 4-6 hours minimum:  Change oxygen saturation probe site  4.  Every 4-6 hours:  If on nasal continuous positive airway pressure, respiratory therapy assess nares and determine need for appliance change or resting period  Outcome: Progressing     Problem: ABCDS Injury Assessment  Goal: Absence of physical injury  Outcome: Progressing

## 2025-02-02 NOTE — PLAN OF CARE
1216 by Erin Alexis, RN  Outcome: Progressing  Flowsheets (Taken 2/2/2025 0748 by Rosalio Masters)  Skin Integrity Remains Intact: Monitor for areas of redness and/or skin breakdown  2/2/2025 0008 by Elizabeth Paula, RN  Outcome: Progressing     Problem: ABCDS Injury Assessment  Goal: Absence of physical injury  2/2/2025 1216 by Erin Alexis, RN  Outcome: Progressing  2/2/2025 0008 by Elizabeth Paula RN  Outcome: Progressing

## 2025-02-02 NOTE — PLAN OF CARE
Problem: Physical Therapy - Adult  Goal: By Discharge: Performs mobility at highest level of function for planned discharge setting.  See evaluation for individualized goals.  Description: FUNCTIONAL STATUS PRIOR TO ADMISSION: Patient was independent and active without use of DME.    HOME SUPPORT PRIOR TO ADMISSION: The patient lived with  but did not require assistance.    Physical Therapy Goals  Initiated 2/1/2025  1.  Patient will move from supine to sit and sit to supine, scoot up and down, and roll side to side in bed with modified independence within 7 day(s).    2.  Patient will perform sit to stand with modified independence within 7 day(s).  3.  Patient will transfer from bed to chair and chair to bed with modified independence using the least restrictive device within 7 day(s).  4.  Patient will ambulate with modified independence for 150 feet with the least restrictive device within 7 day(s).   Outcome: Progressing   PHYSICAL THERAPY TREATMENT    Patient: Kamilla Lantigua (89 y.o. female)  Date: 2/2/2025  Diagnosis: Closed fracture of neck of left femur, initial encounter ([facility]) [S72.002A]  Closed left hip fracture, initial encounter ([facility]) [S72.002A] Closed left hip fracture, initial encounter ([facility])  Procedure(s) (LRB):  LEFT HIP HEMIARTHROPLASTY ANTERIOR (Left) 2 Days Post-Op  Precautions: Fall Risk, Bed Alarm, Weight Bearing   Left Lower Extremity Weight Bearing: Weight Bearing As Tolerated                  ASSESSMENT:  Patient continues to benefit from skilled PT services and is progressing towards goals. Patient received up in chair and agreeable to participate, spouse also present in room.  Good tolerance of seated ther ex, then came to stand with min assist to RW and ambulated x approx 20 feet with one seated rest break.  Gait royce is slow and patient has difficulty advancing left LE, tends to drag foot forward and towards midline, requires physical assist to clear floor and maintain  proper foot position.  Note patient does have active DF on left, encouraged her to perform ankle pumps and educated on gait sequence with heel as initial contact.  Left up in chair with call bell in reach.  Patient continues to be well below indep baseline and will benefit from continued intensive therapy.           PLAN:  Patient continues to benefit from skilled intervention to address the above impairments.  Continue treatment per established plan of care.        Recommendation for discharge: (in order for the patient to meet his/her long term goals):   High intensity/comprehensive skilled physical therapy in a multidisciplinary setting as patient is working towards tolerating up to 3 hours of therapy/day 5-7x/week    Other factors to consider for discharge: patient's current support system is unable to meet their requirements for physical assistance, high risk for falls, not safe to be alone, and concern for safely navigating or managing the home environment    IF patient discharges home will need the following DME: rolling walker       SUBJECTIVE:   Patient stated, \"Ok, I know I need to move.\"    OBJECTIVE DATA SUMMARY:   Critical Behavior:          Functional Mobility Training:  Bed Mobility:     Transfers:  Transfer Training  Sit to Stand: Minimum assistance  Stand to Sit: Minimum assistance  Bed to Chair: Minimum assistance;Assist X2;Additional time  Balance:  Balance  Sitting: Intact  Sitting - Static: Good (unsupported)  Sitting - Dynamic: Good (unsupported)  Standing: Impaired  Standing - Static: Constant support;Good  Standing - Dynamic: Constant support;Fair   Ambulation/Gait Training:     Gait  Left Side Weight Bearing: As tolerated  Overall Level of Assistance: Minimum assistance;Assist X2  Distance (ft): 20 Feet (2 x 10 feet)  Assistive Device: Gait belt;Walker, rolling  Interventions: Safety awareness training;Verbal cues  Base of Support: Narrowed  Speed/Daisy: Slow  Stance: Left decreased  Gait

## 2025-02-02 NOTE — CARE COORDINATION
Transition of Care Plan:    RUR: 17%-\"Moderate Risk\"  Prior Level of Functioning: Independent in her ADLs  Disposition: IPR-the patient's  and son both want for the patient to d/c to [facility]. CM has sent the patient's referral to [facility] for review  VIVIEN: 2/3/25  If SNF or IPR: Date FOC offered: 2/2/25  Date FOC received: 2/2/25  Accepting facility: TBD-referral sent to [facility] for review  Date authorization started with reference number: N/A-the patient will not require auth  Follow up appointments: PCP & Specialists as indicated   DME needed: The patient owns a rollator and a front-wheeled walker  Transportation at discharge: The patient will likely require stretcher transport on d/c  IM/IMM Medicare/ letter given: 2nd IM to be given prior to d/c  Is patient a [place] and connected with [facility]? N/A   If yes, was  transfer form completed and [facility] notified?   Caregiver Contact: Jason Lantigua, Son, Phone: [phone]-the patient and her  prefer that the patient's son be the point-of-contact for dispo planning  Discharge Caregiver contacted prior to discharge? CM spoke to the patient's son via phone  Care Conference needed? No  Barriers to discharge: Pending medical stability and acceptance to IPR facility     The patient uses the [facility] at [facility] for her medications. The patient has had HH services in the past and also went to [facility] about 2 years ago for IPR. The patient's  and son would like for the patient to d/c to [facility] for IPR, CM has sent the patient's referral to [facility] via [facility] for review.      02/02/25 1429   Service Assessment   Patient Orientation Alert and Oriented   Cognition Alert   History Provided By Patient;Spouse   Primary Caregiver Self   Support Systems Spouse/Significant Other;Children;Family Members  (The patient lives with her . Their house is next door to their son, his wife, and their 2 grandchildren. They have 2 daughters that both reside in [place].)   PCP

## 2025-02-02 NOTE — PROGRESS NOTES
ORTHO - Progress Note  Post Op day: 2 Days Post-Op    Kamilla Lantigua     [id number]  female    89 y.o.    1935    Admit date:2025  Date of Surgery:  Procedures:Procedure(s):  LEFT HIP HEMIARTHROPLASTY ANTERIOR  Surgeon:Surgeons and Role:   * Frederick Mayen MD - Primary        SUBJECTIVE:     Kamilla Lantigua is a 89 y.o. female resting in the chair.  \"Its really sore today\"   at bedside    Denies F/C, nausea, vomiting, dizziness, lightheadedness, chest pain, or shortness of breath.    OBJECTIVE:       Physical Exam:  General: alert, cooperative, no distress.   Gastrointestinal:  non-distended .    Cardiovascular: equal pulses in the  lower extremities,  Brisk cap refill in all distal extremities   Genitourinary: Voiding independently   Respiratory: No respiratory distress   Neurological:Neurovascular exam within normal limits.     Senstion intact: LE bilat.    Motor: + DF/PF/EHL.   Musculoskeletal: Hayes's sign negative in bilateral lower extremities.  Calves soft, supple, non-tender upon palpation or with passive stretch.    Dressing/Wound:  Clean, dry and intact. No significant erythema or swelling.    Vital Signs:       Patient Vitals for the past 8 hrs:   BP Temp Temp src Pulse Resp SpO2   25 1250 (!) 126/44 98.6 °F (37 °C) -- 85 18 94 %   25 0748 (!) 152/68 99 °F (37.2 °C) Oral (!) 103 16 91 %                                          Temp (24hrs), Av.1 °F (37.3 °C), Min:98.4 °F (36.9 °C), Max:100.2 °F (37.9 °C)      Labs:        Recent Labs     25  1714 25  0534   HCT 38.7 31.0*   HGB 12.4 10.4*   INR 1.1  --      PT/OT:              ASSESSMENT / PLAN:   Principal Problem:    Closed left hip fracture, initial encounter ([facility])  Resolved Problems:    * No resolved hospital problems. *       -  Anterior hip precautions   -  Continue PT/OT WBAT  -  DVT prophylaxis- SCD w/ ASA restart eliquis  -  DC planning - In-pt rehab/[facility]        Signed By: Felix Snell PA-C

## 2025-02-03 VITALS
HEART RATE: 79 BPM | WEIGHT: 130.27 LBS | RESPIRATION RATE: 17 BRPM | HEIGHT: 64 IN | BODY MASS INDEX: 22.24 KG/M2 | OXYGEN SATURATION: 96 % | DIASTOLIC BLOOD PRESSURE: 74 MMHG | TEMPERATURE: 99.9 F | SYSTOLIC BLOOD PRESSURE: 150 MMHG

## 2025-02-03 LAB
ANION GAP SERPL CALC-SCNC: 4 MMOL/L (ref 2–12)
BUN SERPL-MCNC: 13 MG/DL (ref 6–20)
BUN/CREAT SERPL: 25 (ref 12–20)
CALCIUM SERPL-MCNC: 9.1 MG/DL (ref 8.5–10.1)
CHLORIDE SERPL-SCNC: 96 MMOL/L (ref 97–108)
CO2 SERPL-SCNC: 32 MMOL/L (ref 21–32)
CREAT SERPL-MCNC: 0.52 MG/DL (ref 0.55–1.02)
GLUCOSE BLD STRIP.AUTO-MCNC: 176 MG/DL (ref 65–117)
GLUCOSE BLD STRIP.AUTO-MCNC: 199 MG/DL (ref 65–117)
GLUCOSE BLD STRIP.AUTO-MCNC: 355 MG/DL (ref 65–117)
GLUCOSE SERPL-MCNC: 198 MG/DL (ref 65–100)
POTASSIUM SERPL-SCNC: 4.1 MMOL/L (ref 3.5–5.1)
SERVICE CMNT-IMP: ABNORMAL
SODIUM SERPL-SCNC: 132 MMOL/L (ref 136–145)

## 2025-02-03 PROCEDURE — 6370000000 HC RX 637 (ALT 250 FOR IP): Performed by: INTERNAL MEDICINE

## 2025-02-03 PROCEDURE — 97535 SELF CARE MNGMENT TRAINING: CPT | Performed by: OCCUPATIONAL THERAPIST

## 2025-02-03 PROCEDURE — 82962 GLUCOSE BLOOD TEST: CPT

## 2025-02-03 PROCEDURE — 36415 COLL VENOUS BLD VENIPUNCTURE: CPT

## 2025-02-03 PROCEDURE — APPNB15 APP NON BILLABLE TIME 0-15 MINS: Performed by: PHYSICIAN ASSISTANT

## 2025-02-03 PROCEDURE — 97530 THERAPEUTIC ACTIVITIES: CPT

## 2025-02-03 PROCEDURE — 97116 GAIT TRAINING THERAPY: CPT

## 2025-02-03 PROCEDURE — 6370000000 HC RX 637 (ALT 250 FOR IP): Performed by: STUDENT IN AN ORGANIZED HEALTH CARE EDUCATION/TRAINING PROGRAM

## 2025-02-03 PROCEDURE — 80048 BASIC METABOLIC PNL TOTAL CA: CPT

## 2025-02-03 PROCEDURE — 99024 POSTOP FOLLOW-UP VISIT: CPT | Performed by: PHYSICIAN ASSISTANT

## 2025-02-03 RX ORDER — INSULIN GLARGINE 100 [IU]/ML
20 INJECTION, SOLUTION SUBCUTANEOUS NIGHTLY
Qty: 5 ADJUSTABLE DOSE PRE-FILLED PEN SYRINGE | Refills: 3 | Status: SHIPPED
Start: 2025-02-03

## 2025-02-03 RX ADMIN — GLIPIZIDE 2.5 MG: 5 TABLET ORAL at 10:00

## 2025-02-03 RX ADMIN — ACETAMINOPHEN 650 MG: 325 TABLET ORAL at 10:00

## 2025-02-03 RX ADMIN — APIXABAN 5 MG: 5 TABLET, FILM COATED ORAL at 10:00

## 2025-02-03 RX ADMIN — INSULIN LISPRO 10 UNITS: 100 INJECTION, SOLUTION INTRAVENOUS; SUBCUTANEOUS at 12:28

## 2025-02-03 RX ADMIN — INSULIN LISPRO 2 UNITS: 100 INJECTION, SOLUTION INTRAVENOUS; SUBCUTANEOUS at 10:00

## 2025-02-03 NOTE — PROGRESS NOTES
ORTHO - Progress Note  Post Op day: 3 Days Post-Op    Kamilla Lantigua     [id number]  female    89 y.o.    1935    Admit date:2025  Date of Surgery:  Procedures:Procedure(s):  LEFT HIP HEMIARTHROPLASTY ANTERIOR  Surgeon:Surgeons and Role:   * Frederick Mayen MD - Primary        SUBJECTIVE:     Kamilla Lantigua is a 89 y.o. female resting in the chair.  \"Its really sore today\"      Denies F/C, nausea, vomiting, dizziness, lightheadedness, chest pain, or shortness of breath.    OBJECTIVE:       Physical Exam:  General: alert, cooperative, no distress.   Gastrointestinal:  non-distended .    Cardiovascular: equal pulses in the  lower extremities,  Brisk cap refill in all distal extremities   Genitourinary: Voiding independently   Respiratory: No respiratory distress   Neurological:Neurovascular exam within normal limits.     Senstion intact: LE bilat.    Motor: + DF/PF/EHL.   Musculoskeletal: Hayes's sign negative in bilateral lower extremities.  Calves soft, supple, non-tender upon palpation or with passive stretch.    Dressing/Wound:  Clean, dry and intact. No significant erythema or swelling.    Vital Signs:       Patient Vitals for the past 8 hrs:   BP Temp Pulse Resp SpO2   25 1211 133/60 98.2 °F (36.8 °C) 82 19 98 %   25 0945 (!) 157/78 98.1 °F (36.7 °C) 99 19 94 %                                          Temp (24hrs), Av.4 °F (36.9 °C), Min:97.7 °F (36.5 °C), Max:99.1 °F (37.3 °C)    Date 25 0000 - 25 2359   Shift 4827-4376 8099-4534 2608-4973 24 Hour Total   INTAKE   Shift Total(mL/kg)       OUTPUT   Urine(mL/kg/hr) 250(0.5)   250   Shift Total(mL/kg) 250(4.2)   250(4.2)   Weight (kg) 59.1 59.1 59.1 59.1     Labs:        Recent Labs     25  0534   HCT 31.0*   HGB 10.4*     PT/OT:              ASSESSMENT / PLAN:   Principal Problem:    Closed left hip fracture, initial encounter ([facility])  Resolved Problems:    * No resolved hospital problems. *       -  Anterior hip

## 2025-02-03 NOTE — PLAN OF CARE
Problem: Occupational Therapy - Adult  Goal: By Discharge: Performs self-care activities at highest level of function for planned discharge setting.  See evaluation for individualized goals.  Description: FUNCTIONAL STATUS PRIOR TO ADMISSION:  Patient was ambulatory using no AD, IND-Gail for ADLs. Lives with . [place].    , Prior Level of Assist for ADLs: Independent,  ,  ,  ,  ,  , Prior Level of Assist for Homemaking: Independent,  , Prior Level of Assist for Transfers: Independent, Active : Yes      Occupational Therapy Goals  Initiated 2/1/2025    1. Patient will perform lower body dressing with AE PRN with Contact Guard Assist within 7 day(s).  2. Patient will perform upper body ADLS standing for 5 minutes without fatigue or LOB with Stand by Assist within 7 days.  3. Patient will perform toilet transfers with Supervision within 7 days.  4. Patient will perform all aspects of toileting at Supervision within 7 days.  5. Patient will utilize energy conservation techniques during functional activities without cues within 7 day(s).      Outcome: Progressing    OCCUPATIONAL THERAPY TREATMENT  Patient: Kamilla Lantigua (89 y.o. female)  Date: 2/3/2025  Primary Diagnosis: Closed fracture of neck of left femur, initial encounter ([facility]) [S72.002A]  Closed left hip fracture, initial encounter ([facility]) [S72.002A]  Procedure(s) (LRB):  LEFT HIP HEMIARTHROPLASTY ANTERIOR (Left) 3 Days Post-Op   Precautions: Fall Risk, Bed Alarm, Weight Bearing   Left Lower Extremity Weight Bearing: Weight Bearing As Tolerated            Chart, occupational therapy assessment, plan of care, and goals were reviewed.    ASSESSMENT  Patient continues to benefit from skilled OT services and is progressing towards goals. Pt was agreeable to treatment and sitting up in chair.  Generally pt needed CGA to min A for functional transfers using the RW and significant additional time.  She needed assistance initially with advancing her LLE, but  Strategies;Transfer Training;Mobility Training;Fall Prevention Strategies  Education Method: Demonstration;Verbal;Teach Back  Barriers to Learning: Hearing;Cognition  Education Outcome: Continued education needed    Thank you for this referral.  Lilo Astorga OTR/L  Minutes: 35

## 2025-02-03 NOTE — PLAN OF CARE
Problem: Physical Therapy - Adult  Goal: By Discharge: Performs mobility at highest level of function for planned discharge setting.  See evaluation for individualized goals.  Description: FUNCTIONAL STATUS PRIOR TO ADMISSION: Patient was independent and active without use of DME.    HOME SUPPORT PRIOR TO ADMISSION: The patient lived with  but did not require assistance.    Physical Therapy Goals  Initiated 2/1/2025  1.  Patient will move from supine to sit and sit to supine, scoot up and down, and roll side to side in bed with modified independence within 7 day(s).    2.  Patient will perform sit to stand with modified independence within 7 day(s).  3.  Patient will transfer from bed to chair and chair to bed with modified independence using the least restrictive device within 7 day(s).  4.  Patient will ambulate with modified independence for 150 feet with the least restrictive device within 7 day(s).   Outcome: Progressing  PHYSICAL THERAPY TREATMENT    Patient: Kamilla Lantigua (89 y.o. female)  Date: 2/3/2025  Diagnosis: Closed fracture of neck of left femur, initial encounter ([facility]) [S72.002A]  Closed left hip fracture, initial encounter ([facility]) [S72.002A] Closed left hip fracture, initial encounter ([facility])  Procedure(s) (LRB):  LEFT HIP HEMIARTHROPLASTY ANTERIOR (Left) 3 Days Post-Op  Precautions: Fall Risk, Bed Alarm, Weight Bearing   Left Lower Extremity Weight Bearing: Weight Bearing As Tolerated                  ASSESSMENT:  Patient continues to benefit from skilled PT services and is progressing towards goals. Pt received sitting up in bedside chair, agreeable to participate in therapy. Pt demonstrating continued improvements with mobility however is limited 2/2 poor gait mechanics, decreased strength and ROM and overall deconditioning. Continues to have difficulty advancing LLE, requiring manual assist however progressed to not needing assistance with improved step length. No LOB or unsteadiness

## 2025-02-03 NOTE — DISCHARGE SUMMARY
Discharge Summary    Name: Kamilla Lantigua  [id number]  YOB: 1935 (Age: 89 y.o.)   Date of Admission: 1/30/2025  Date of Discharge: 2/3/2025  Attending Physician: Corby Coronado MD    Discharge Diagnosis:  Left displaced femoral neck fracture     Consultations:  IP CONSULT TO ORTHOPEDIC SURGERY      Brief Admission History/Reason for Admission Per Debbie Douglas MD:   Kamilla Lantigua is a 89 y.o.  female with PMHx significant as mentioned below presented to ED after ground-level fall.  She was trying to get up , she tripped and fell.   She denies chest pain, syncopal episodes or light headed ness, palpitations prior to event.   ROS: denies fever, abdominal pain, runny nose, urinary or bowel symptoms   She has chronic bilateral LE minimal edema post hip surgery last year - unchanged as per her.           Patient was recently admitted-discharged on 12/1/2024.  She was admitted with diagnosis of metabolic encephalopathy likely due to acute on chronic hypercarbic hypoxic respiratory failure due to multifocal pneumonia and COPD exacerbation requiring ICU admission for transient BiPAP.  Later she was found to have PE-initially was on heparin transition to apixaban on discharge.  She recently completed antibiotic course for pneumonia       Brief Hospital Course by Main Problems:     Left displaced femoral neck fracture   S/p mechanical ground level  fall  -S/P left hip hemiarthroplasty 1/31  -continue eliquis  -miralax prn  -PT OT eval and treat.  WBAT.  Rec IPR, follow-up with orthopedic surgeon outpatient basis     PE on Eliquis  Paroxysmal Afib   Prior history of right hip fracture 1 year ago s/p hip hemiarthroplasty  COPD- on 2-3 l NC at baseline   Chronic pain   - restarting eliquis 1/31  - Continue with scheduled nebulization  - Incentive spirometer post surgery      DM2  -Poorly controlled, serum glucose increased as much as in 300s, add Lantus 20 units at

## 2025-02-03 NOTE — CARE COORDINATION
Transition of Care Plan:    RUR: 16% moderate risk  Prior Level of Functioning: independent  Disposition: IPR - [facility]  VIVIEN: 2/3/25  If SNF or IPR: Date FOC offered: 2/2/25  Date FOC received: 2/2/25  Accepting facility: [facility]  Date authorization started with reference number: n/a  Date authorization received and expires:   Follow up appointments: defer to facility  DME needed: defer to facility  Transportation at discharge: 1630 [facility] pickup  IM/IMM Medicare/ letter given: 1/30/25  Is patient a  and connected with [facility]?    If yes, was [place] transfer form completed and [facility] notified?   Caregiver Contact: son Jason Lantigua [phone]  Discharge Caregiver contacted prior to discharge? yes  Care Conference needed? no  Barriers to discharge: none    Notified by [facility] liaison that pt has been accepted and can admit today if medically stable. Discussed in IDT rounds, hospitalist confirmed pt's medical readiness for discharge. CM informed [facility] liaison, who reported that pt may admit after 1600 this afternoon. CM went to pt room to provide update, pt working with therapy, CM updated board with discharge information. Called pt's son Jason Lantigua [phone] as he is pt's preferred contact for discharge planning; informed of discharge today to [facility], Jason verbalized understanding of and continued preference for rehab at [facility] as post-acute plan. Transport arranged through [facility] with scheduled pickup time of 1630. Care team updated. CM will update this note with room and report information when available from [facility].    Stephanie Anne, JUDITH  Care Management  [phone]

## 2025-02-03 NOTE — PROGRESS NOTES
End of Shift Note    Bedside shift change report given to Elizabeth RAMOS (oncoming nurse) by Erin Alexis RN (offgoing nurse).  Report included the following information SBAR, Kardex, Intake/Output, MAR, Recent Results, and Cardiac Rhythm nsr    Shift worked:  day     Shift summary and any significant changes:     VS stable, pt up with 2 assist and walker to chair/bsc, voiding clear yellow urine via purewicc, prn tylenol given for complaints of pain     Concerns for physician to address:       Zone phone for oncoming shift:          Activity:  Level of Assistance: Maximum assist, patient does 25-49%  Number times ambulated in hallways past shift: 0  Number of times OOB to chair past shift: 1    Cardiac:   Cardiac Monitoring: Yes      Cardiac Rhythm: Sinus rhythm    Access:  Current line(s): PIV     Genitourinary:   Urinary Status: Voiding    Respiratory:   O2 Device: None (Room air)  Chronic home O2 use?: NO  Incentive spirometer at bedside: YES    GI:     Current diet:  ADULT DIET; Regular  Passing flatus: YES    Pain Management:   Patient states pain is manageable on current regimen: YES    Skin:  August Scale Score: 18  Interventions: Wound Offloading (Prevention Methods): Bed, pressure reduction mattress, Repositioning, Pillows    Patient Safety:  Fall Risk: Nursing Judgement-Fall Risk High(Add Comments): Yes  Fall Risk Interventions  Nursing Judgement-Fall Risk High(Add Comments): Yes  Toilet Every 2 Hours-In Advance of Need: Yes  Hourly Visual Checks: Awake, In bed  Fall Visual Posted: Armband, Fall sign posted, Socks  Room Door Open: Yes  Alarm On: Bed  Patient Moved Closer to Nursing Station: No    Active Consults:   IP CONSULT TO ORTHOPEDIC SURGERY    Length of Stay:  Expected LOS: 4  Actual LOS: 3    Erin Alexis, RN

## 2025-03-10 NOTE — ED TRIAGE NOTES
Pt arrived to ED via EMS.     Per EMS pt was seen at other hospital today, given Dx of pneumonia. Pt received IV abx and d/c home. Per EMS- pt family states pt declined when she got home.     
No

## 2025-03-20 ENCOUNTER — OFFICE VISIT (OUTPATIENT)
Age: 89
End: 2025-03-20
Payer: MEDICARE

## 2025-03-20 VITALS
OXYGEN SATURATION: 94 % | TEMPERATURE: 99 F | DIASTOLIC BLOOD PRESSURE: 70 MMHG | HEIGHT: 63 IN | SYSTOLIC BLOOD PRESSURE: 160 MMHG | BODY MASS INDEX: 23.08 KG/M2 | HEART RATE: 65 BPM

## 2025-03-20 DIAGNOSIS — R60.0 BILATERAL LOWER EXTREMITY EDEMA: ICD-10-CM

## 2025-03-20 DIAGNOSIS — I10 ESSENTIAL (PRIMARY) HYPERTENSION: ICD-10-CM

## 2025-03-20 DIAGNOSIS — Z99.81 CHRONIC HYPOXIC RESPIRATORY FAILURE, ON HOME OXYGEN THERAPY: ICD-10-CM

## 2025-03-20 DIAGNOSIS — Z76.89 ESTABLISHING CARE WITH NEW DOCTOR, ENCOUNTER FOR: Primary | ICD-10-CM

## 2025-03-20 DIAGNOSIS — Z91.81 AT HIGH RISK FOR FALLS: ICD-10-CM

## 2025-03-20 DIAGNOSIS — E78.2 MIXED HYPERLIPIDEMIA: ICD-10-CM

## 2025-03-20 DIAGNOSIS — E03.9 ACQUIRED HYPOTHYROIDISM: ICD-10-CM

## 2025-03-20 DIAGNOSIS — I05.9 MITRAL VALVE DISORDER: ICD-10-CM

## 2025-03-20 DIAGNOSIS — I48.0 PAROXYSMAL ATRIAL FIBRILLATION (HCC): ICD-10-CM

## 2025-03-20 DIAGNOSIS — E11.9 TYPE 2 DIABETES MELLITUS WITHOUT COMPLICATION, WITHOUT LONG-TERM CURRENT USE OF INSULIN: ICD-10-CM

## 2025-03-20 DIAGNOSIS — J96.11 CHRONIC HYPOXIC RESPIRATORY FAILURE, ON HOME OXYGEN THERAPY: ICD-10-CM

## 2025-03-20 PROBLEM — J18.9 PNEUMONIA, UNSPECIFIED ORGANISM: Status: RESOLVED | Noted: 2021-12-16 | Resolved: 2025-03-20

## 2025-03-20 PROBLEM — D64.9 ANEMIA, UNSPECIFIED: Status: RESOLVED | Noted: 2022-07-02 | Resolved: 2025-03-20

## 2025-03-20 PROBLEM — R09.02 HYPOXIA: Status: RESOLVED | Noted: 2023-07-13 | Resolved: 2025-03-20

## 2025-03-20 PROBLEM — J18.9 ATYPICAL PNEUMONIA: Status: RESOLVED | Noted: 2024-01-08 | Resolved: 2025-03-20

## 2025-03-20 PROBLEM — J11.1 INFLUENZA: Status: RESOLVED | Noted: 2023-07-13 | Resolved: 2025-03-20

## 2025-03-20 PROBLEM — D64.9 ANEMIA, UNSPECIFIED: Status: ACTIVE | Noted: 2022-07-02

## 2025-03-20 PROBLEM — J96.01 ACUTE HYPOXEMIC RESPIRATORY FAILURE: Status: RESOLVED | Noted: 2024-03-26 | Resolved: 2025-03-20

## 2025-03-20 PROBLEM — S72.001A CLOSED RIGHT HIP FRACTURE, INITIAL ENCOUNTER (HCC): Status: RESOLVED | Noted: 2023-08-22 | Resolved: 2025-03-20

## 2025-03-20 PROBLEM — S72.002A CLOSED LEFT HIP FRACTURE, INITIAL ENCOUNTER (HCC): Status: RESOLVED | Noted: 2025-01-30 | Resolved: 2025-03-20

## 2025-03-20 PROCEDURE — 1159F MED LIST DOCD IN RCRD: CPT | Performed by: STUDENT IN AN ORGANIZED HEALTH CARE EDUCATION/TRAINING PROGRAM

## 2025-03-20 PROCEDURE — 1124F ACP DISCUSS-NO DSCNMKR DOCD: CPT | Performed by: STUDENT IN AN ORGANIZED HEALTH CARE EDUCATION/TRAINING PROGRAM

## 2025-03-20 PROCEDURE — G2211 COMPLEX E/M VISIT ADD ON: HCPCS | Performed by: STUDENT IN AN ORGANIZED HEALTH CARE EDUCATION/TRAINING PROGRAM

## 2025-03-20 PROCEDURE — 1036F TOBACCO NON-USER: CPT | Performed by: STUDENT IN AN ORGANIZED HEALTH CARE EDUCATION/TRAINING PROGRAM

## 2025-03-20 PROCEDURE — 1160F RVW MEDS BY RX/DR IN RCRD: CPT | Performed by: STUDENT IN AN ORGANIZED HEALTH CARE EDUCATION/TRAINING PROGRAM

## 2025-03-20 PROCEDURE — 99204 OFFICE O/P NEW MOD 45 MIN: CPT | Performed by: STUDENT IN AN ORGANIZED HEALTH CARE EDUCATION/TRAINING PROGRAM

## 2025-03-20 PROCEDURE — G8420 CALC BMI NORM PARAMETERS: HCPCS | Performed by: STUDENT IN AN ORGANIZED HEALTH CARE EDUCATION/TRAINING PROGRAM

## 2025-03-20 PROCEDURE — G8427 DOCREV CUR MEDS BY ELIG CLIN: HCPCS | Performed by: STUDENT IN AN ORGANIZED HEALTH CARE EDUCATION/TRAINING PROGRAM

## 2025-03-20 PROCEDURE — 1126F AMNT PAIN NOTED NONE PRSNT: CPT | Performed by: STUDENT IN AN ORGANIZED HEALTH CARE EDUCATION/TRAINING PROGRAM

## 2025-03-20 PROCEDURE — 1090F PRES/ABSN URINE INCON ASSESS: CPT | Performed by: STUDENT IN AN ORGANIZED HEALTH CARE EDUCATION/TRAINING PROGRAM

## 2025-03-20 ASSESSMENT — ENCOUNTER SYMPTOMS
DIARRHEA: 0
SHORTNESS OF BREATH: 0
CONSTIPATION: 0
NAUSEA: 0
VOMITING: 0
COUGH: 0
ABDOMINAL PAIN: 0
BLOOD IN STOOL: 0

## 2025-03-20 ASSESSMENT — PATIENT HEALTH QUESTIONNAIRE - PHQ9
SUM OF ALL RESPONSES TO PHQ QUESTIONS 1-9: 0
SUM OF ALL RESPONSES TO PHQ QUESTIONS 1-9: 0
2. FEELING DOWN, DEPRESSED OR HOPELESS: NOT AT ALL
SUM OF ALL RESPONSES TO PHQ QUESTIONS 1-9: 0
SUM OF ALL RESPONSES TO PHQ QUESTIONS 1-9: 0
1. LITTLE INTEREST OR PLEASURE IN DOING THINGS: NOT AT ALL

## 2025-03-20 NOTE — PROGRESS NOTES
RM 14    Chief Complaint   Patient presents with    New Patient       Vitals:    03/20/25 1104 03/20/25 1106   BP: (!) 161/77 (!) 169/78   BP Site: Left Upper Arm Right Upper Arm   Patient Position: Sitting Sitting   Pulse: 65    Temp: 99 °F (37.2 °C)    TempSrc: Oral    SpO2: 94%    Height: 1.6 m (5' 3\")         \"Have you been to the ER, urgent care clinic since your last visit?  Hospitalized since your last visit?\"    NO    “Have you seen or consulted any other health care providers outside of [facility] since your last visit?”    NO            Click Here for Release of Records Request   AVS  education, follow up, and recommendations provided and addressed with patient.  services used to advise patient No  
and stable. Elevated legs, compression socks, follow-up PRN  On the basis of positive falls risk screening, assessment and plan is as follows: home safety tips provided.    Return for Medicare Wellness.         An electronic signature was used to authenticate this note.    --Steffen Hays MD

## 2025-04-17 ENCOUNTER — TELEPHONE (OUTPATIENT)
Age: 89
End: 2025-04-17

## 2025-04-18 ENCOUNTER — TELEPHONE (OUTPATIENT)
Age: 89
End: 2025-04-18

## 2025-04-18 NOTE — TELEPHONE ENCOUNTER
Contacted patient regarding upcoming Medicare wellness appointment and completion of HRA questionnaire. Will complete via mc per patient's son.     Referred by: Kirsten Last NP; Medical Diagnosis (from order):    Diagnosis Information      Diagnosis    719.41 (ICD-9-CM) - M25.511 (ICD-10-CM) - Acute pain of right shoulder                Physical Therapy -  Initial Evaluation    Visit:  1   Treatment Diagnosis: right: shoulder symptoms with increased pain/symptoms, impaired posture, impaired range of motion, impaired joint play/mobility, impaired motor function/performance/coordination, impaired strength, impaired scapulohumeral rhythm, impaired activity tolerance, impaired body mechanics  Date of onset: One month ago  Chart reviewed at time of initial evaluation (relevant co-morbidities, allergies, tests and medications listed): Hx of scoliosis, tremors, seizures, developmental delay, Vit D deficiency   Diagnostic Tests: X-ray: reviewed.      SUBJECTIVE                                                                                                             At first my mother and grandmother thought it was because I slept on it wrong, but that was a few weeks ago. So I thought it has to be more than that. It has been hurting a lot, it will go from my arm to my hand, and my hand will get numb. It is hard to sleep at night. No CHRIS. Sometimes I will take a tramadol or a migraine relief medication to help the pain.   Patient motions to right lateral shoulder to elbow as the main areas of pain.       Pain / Symptoms:  Pain rating (out of 10): Current: 0 ; Best: 0; Worst: 10  Location: Right lateral shoulder to elbow and hand   Quality / Description: popping / clicking.  Alleviating Factors: prescription medications.   Progression since onset: no change  Function:   Limitations / Exacerbation Factors: pain, difficulty, increased time, lifting/carrying  Prior Level of Function: pain free ADLs and IADLs,  Patient Goals: decreased pain, increased strength and independence with ADLs/IADLs    Prior treatment: no therapies  Discharged from hospital, home health, or  skilled nursing facility in last 30 days: no    Home Environment:   Patient lives with parent or legal guardian. sibling(s)  Type of home: multiple level home  Assistance available: as needed  Feels safe at home/work/school.  2 or more falls or an unexplained fall with injury in the last year:  No    OBJECTIVE                                                                                                                     Hand Dominance: right-handed;   Observation:   Cervical: forward head   Shoulder: rounded    Right Scapula: winging  Spine: decreased thoracic kyphosis  Seated Posture: fair  Range of Motion (ROM)   (norms in parentheses, degrees unless noted, active unless noted):   Shoulder:     - Flexion (180):        • Left: WNL         • Right: WNL pain     - Abduction (180):        • Left: WNL         • Right: WNL pain    - Behind Back Internal Rotation:        • Left: WNL        • Right: PSIS pain    - External Rotation at 0°:         • Left: 75        • Right: 60    Strength  (out of 5 unless noted, standard test position unless noted, lbs tested with hand held dynamometer)   Shoulder:     - Flexion:         • Left: 4+         • Right: 4, pain     - Abduction:        • Left: 4+        • Right: 4, pain    - Internal Rotation:          • Left (at 0°): 4+        • Right (at 0°): 4+    - External Rotation:         • Left (at 0°): 4+        • Right (at 0°) :4-, pain     Palpation:       Right: Infraspinatus: tender; Supraspinatus: tender; Thoracic Paraspinals: tender; anterior tenderness, biceps tendon (proximal) tenderness  Joint Play:   Shoulder:     Additional joint play details: Mild laxity right GH joint  Special Tests:  Crossover Impingement Test: Right: positive  Pa-Bereket: Right: positive  Neer's: Right: positive  Speed's: Right: positive  Compression-Rotation Test: Left: positive  Lafourche's Test: Left: positive    Quick Disabilities of the Arm, Shoulder and Hand (QDASH): Score: 24 (10-50),  Calculated Score 35 (0-100)  scored 0-100; a higher score indicates greater disability    TREATMENT                                                                                                                initial evaluation completed  Therapeutic Exercise:  Scapular squeezes  Passive shoulder flexion on table  Biceps curls 1#  Therapeutic Activity:  Patient educated on diagnosis, prognosis, plan of care, home exercise program and goals. Patient agrees with current POC.     Skilled input: verbal instruction/cues and tactile instruction/cues    Writer verbally educated and received verbal consent for hand placement, positioning of patient, and techniques to be performed today from patient for hand placement and palpation for techniques and therapist position for techniques as described above and how they are pertinent to the patient's plan of care.    Home Exercise Program: (*above indicates provided as part of home exercise program)  Scapular squeezes  Passive shoulder flexion on table  Biceps curls 1#      ASSESSMENT                                                                                                             31 year old female patient has reported functional limitations listed above impacted by signs and symptoms consistent with right shoulder, increased pain/symptoms, impaired posture, impaired range of motion, impaired joint play/mobility, impaired motor function/performance/coordination, impaired strength, impaired scapulohumeral rhythm, impaired activity tolerance and impaired body mechanics.  Symptoms suggest strain of myofascial tissue of shoulder complex with mild GH instability. Mild positive tests for rotator cuff pathology. Able to complete initial exercises with little difficulty.     Prognosis: patient will benefit from skilled therapy  Rehabilitative Potential: very good  Predicted patient presentation: Low (stable) - Patient comorbidities and complexities, as defined above, will  have little effect on progress for prescribed plan of care.    Patient Education:   Who will be receiving education: patient  Are they ready to learn: yes  Preferred learning style: written, verbal and demonstration  Barriers to learning: no barriers apparent at this time  Results of above outlined education: Verbalizes understanding     PLAN                                                                                                                           The following skilled interventions to be implemented to achieve goals listed below:  Manual Therapy (78774)  Neuromuscular Re-Education (51810)  Therapeutic Activity (33485)  Therapeutic Exercise (10677)      Frequency / Duration: 2 times per week tapering as patient progresses for an estimated total of 16 visits for 8 weeks    patient involved in and agreed to plan of care and goals.  Patient has been given attendance policy at time of initial evaluation.    Suggestions for next session as indicated: Progress per plan of care. Review HEP. Shoulder PROM/AAROM, scapular strengthening as able    GOALS                                                                                                                       Long Term Goals: To be met by end of plan of care:      Home Exercise Program: Independent with progressed and modified home exercise program (HEP)      Strength: Improve involved strength to  5    Reaching: Reach overhead and without reported difficulty     Lift: Lift moderate weight household items and without reported difficulty       Procedures and total treatment time documented Time Entry flowsheet.

## 2025-05-20 ENCOUNTER — TELEPHONE (OUTPATIENT)
Age: 89
End: 2025-05-20

## 2025-05-20 NOTE — TELEPHONE ENCOUNTER
Contacted patient regarding upcoming Medicare wellness appointment and completion of HRA questionnaire. Will complete via mc per patient's son.

## 2025-06-26 ENCOUNTER — TELEPHONE (OUTPATIENT)
Age: 89
End: 2025-06-26

## (undated) DEVICE — DRESSING FOAM POST OPERATIVE 4X10 IN MEPILEX BORDER AG

## (undated) DEVICE — YANKAUER,SMOOTH HANDLE,HIGH CAPACITY: Brand: MEDLINE INDUSTRIES, INC.

## (undated) DEVICE — SOLUTION IRRIG 1000ML 0.9% SOD CHL USP POUR PLAS BTL

## (undated) DEVICE — GLOVE ORTHO 7 1/2   MSG9475

## (undated) DEVICE — 450 ML BOTTLE OF 0.05% CHLORHEXIDINE GLUCONATE IN 99.95% STERILE WATER FOR IRRIGATION, USP AND APPLICATOR.: Brand: IRRISEPT ANTIMICROBIAL WOUND LAVAGE

## (undated) DEVICE — SUTURE VCRL SZ 1 L36IN ABSRB UD L36MM CT-1 1/2 CIR J947H

## (undated) DEVICE — GRIPPER SURGICAL RETRACTOR DISP

## (undated) DEVICE — SPONGE GZ W4XL4IN COT 12 PLY TYP VII WVN C FLD DSGN STERILE

## (undated) DEVICE — 3M™ IOBAN™ 2 ANTIMICROBIAL INCISE DRAPE 6651EZ: Brand: IOBAN™ 2

## (undated) DEVICE — BONE PREPARATION KIT: Brand: BIOPREP

## (undated) DEVICE — 6619 2 PTNT ISO SYS INCISE AREA&LT;(&GT;&&LT;)&GT;P: Brand: STERI-DRAPE™ IOBAN™ 2

## (undated) DEVICE — GLOVE SURG SZ 85 L12IN FNGR THK79MIL GRN LTX FREE

## (undated) DEVICE — GOWN,SIRUS,NONRNF,SETINSLV,2XL,18/CS: Brand: MEDLINE

## (undated) DEVICE — OPTIFOAM GENTLE SA, POSTOP, 4X12: Brand: MEDLINE

## (undated) DEVICE — BLADE ES L6IN ELASTOMERIC COAT EXT DURABLE BEND UPTO 90DEG

## (undated) DEVICE — TOTAL JOINT-MRMC: Brand: MEDLINE INDUSTRIES, INC.

## (undated) DEVICE — APPLICATOR MEDICATED 26 CC SOLUTION HI LT ORNG CHLORAPREP

## (undated) DEVICE — NEEDLE SPNL L3.5IN PNK HUB S STL REG WALL FIT STYL W/ QNCKE

## (undated) DEVICE — SUTURE STRATAFIX SZ 3-0 30CM NONABSORB UD 26MM FS 3/8 SXMP2B412

## (undated) DEVICE — PILLOW POS W15XH6XL22IN RASPBERRY FOAM ABD W/ STRP DISP FOR

## (undated) DEVICE — Device

## (undated) DEVICE — HOOD: Brand: FLYTE

## (undated) DEVICE — LIQUIBAND RAPID ADHESIVE 36/CS 0.8ML: Brand: MEDLINE

## (undated) DEVICE — SNAP KOVER: Brand: UNBRANDED

## (undated) DEVICE — SUTURE VICRYL SZ 1 L36IN ABSRB UD L36MM CT-1 1/2 CIR J947H

## (undated) DEVICE — AEGIS 1" DISK 4MM HOLE, PEEL OPEN: Brand: MEDLINE

## (undated) DEVICE — SOLUTION ANTISEP 70% ISO ALC RUBBING 4 OZ

## (undated) DEVICE — SUTURE ABSRB L30CM 2-0 VLT SPRL PDS + STRATAFIX SXPP1B410

## (undated) DEVICE — OSCILLATING TIP SAW CARTRIDGE: Brand: PRECISION FALCON

## (undated) DEVICE — SOLUTION IRRIG 3000ML 0.9% SOD CHL USP UROMATIC PLAS CONT

## (undated) DEVICE — PADDING CAST W6INXL4YD COT LO LINTING WYTEX

## (undated) DEVICE — SUTURE STRATAFIX SYMMETRIC SZ 1 L18IN ABSRB VLT CT1 L36CM SXPP1A404

## (undated) DEVICE — GLOVE ORANGE PI 7 1/2   MSG9075

## (undated) DEVICE — TRANSFER SET 3": Brand: MEDLINE INDUSTRIES, INC.

## (undated) DEVICE — DRESSING FOAM 4X6 DISP POSTOP MEPILEX BORD AG

## (undated) DEVICE — SOLUTION IRRIG 1000ML STRL H2O USP PLAS POUR BTL

## (undated) DEVICE — SUTURE ETHBND EXCEL SZ 5 L30IN NONABSORBABLE GRN L40MM V-37 MB66G

## (undated) DEVICE — KIT POS FOAM HANA TBL

## (undated) DEVICE — SUTURE VCRL SZ 2-0 L36IN ABSRB UD L36MM CT-1 1/2 CIR J945H

## (undated) DEVICE — GLOVE ORTHO 8   MSG9480

## (undated) DEVICE — 2108 SERIES SAGITTAL BLADE (24.8 X 0.88 X 73.8MM)